# Patient Record
Sex: FEMALE | Race: WHITE | NOT HISPANIC OR LATINO
[De-identification: names, ages, dates, MRNs, and addresses within clinical notes are randomized per-mention and may not be internally consistent; named-entity substitution may affect disease eponyms.]

---

## 2017-02-01 ENCOUNTER — APPOINTMENT (OUTPATIENT)
Dept: UROGYNECOLOGY | Facility: CLINIC | Age: 82
End: 2017-02-01

## 2017-02-15 ENCOUNTER — APPOINTMENT (OUTPATIENT)
Dept: UROGYNECOLOGY | Facility: CLINIC | Age: 82
End: 2017-02-15

## 2017-05-01 ENCOUNTER — OTHER (OUTPATIENT)
Age: 82
End: 2017-05-01

## 2017-05-02 ENCOUNTER — APPOINTMENT (OUTPATIENT)
Dept: ENDOCRINOLOGY | Facility: CLINIC | Age: 82
End: 2017-05-02

## 2017-05-02 VITALS — HEIGHT: 58.3 IN | BODY MASS INDEX: 24.64 KG/M2 | WEIGHT: 119 LBS

## 2017-05-02 VITALS — HEART RATE: 65 BPM | OXYGEN SATURATION: 98 % | SYSTOLIC BLOOD PRESSURE: 130 MMHG | DIASTOLIC BLOOD PRESSURE: 62 MMHG

## 2017-05-02 LAB
ALBUMIN SERPL ELPH-MCNC: 3.9 G/DL
ALP BLD-CCNC: 35 U/L
ALT SERPL-CCNC: 23 U/L
ANION GAP SERPL CALC-SCNC: 14 MMOL/L
AST SERPL-CCNC: 30 U/L
BILIRUB SERPL-MCNC: 0.2 MG/DL
BUN SERPL-MCNC: 12 MG/DL
CALCIUM SERPL-MCNC: 9.8 MG/DL
CHLORIDE SERPL-SCNC: 102 MMOL/L
CO2 SERPL-SCNC: 28 MMOL/L
CREAT SERPL-MCNC: 0.71 MG/DL
GLUCOSE SERPL-MCNC: 99 MG/DL
POTASSIUM SERPL-SCNC: 4.3 MMOL/L
PROT SERPL-MCNC: 6.4 G/DL
SODIUM SERPL-SCNC: 144 MMOL/L

## 2017-05-02 RX ORDER — DENOSUMAB 60 MG/ML
60 INJECTION SUBCUTANEOUS
Qty: 1 | Refills: 0 | Status: COMPLETED | OUTPATIENT
Start: 2017-05-02

## 2017-05-02 RX ADMIN — DENOSUMAB 0 MG/ML: 60 INJECTION SUBCUTANEOUS at 00:00

## 2017-05-17 ENCOUNTER — APPOINTMENT (OUTPATIENT)
Dept: UROGYNECOLOGY | Facility: CLINIC | Age: 82
End: 2017-05-17

## 2017-07-25 ENCOUNTER — APPOINTMENT (OUTPATIENT)
Dept: OBGYN | Facility: CLINIC | Age: 82
End: 2017-07-25

## 2017-07-25 VITALS
WEIGHT: 122 LBS | HEIGHT: 58 IN | DIASTOLIC BLOOD PRESSURE: 80 MMHG | BODY MASS INDEX: 25.61 KG/M2 | SYSTOLIC BLOOD PRESSURE: 122 MMHG

## 2017-07-25 DIAGNOSIS — Z12.12 ENCOUNTER FOR SCREENING FOR MALIGNANT NEOPLASM OF COLON: ICD-10-CM

## 2017-07-25 DIAGNOSIS — Z12.11 ENCOUNTER FOR SCREENING FOR MALIGNANT NEOPLASM OF COLON: ICD-10-CM

## 2017-07-26 ENCOUNTER — MESSAGE (OUTPATIENT)
Age: 82
End: 2017-07-26

## 2017-07-26 LAB — ESTRADIOL SERPL-MCNC: <5 PG/ML

## 2017-08-16 ENCOUNTER — APPOINTMENT (OUTPATIENT)
Dept: UROGYNECOLOGY | Facility: CLINIC | Age: 82
End: 2017-08-16
Payer: MEDICARE

## 2017-08-16 PROCEDURE — 99214 OFFICE O/P EST MOD 30 MIN: CPT

## 2017-08-30 ENCOUNTER — APPOINTMENT (OUTPATIENT)
Dept: UROGYNECOLOGY | Facility: CLINIC | Age: 82
End: 2017-08-30
Payer: MEDICARE

## 2017-08-30 PROCEDURE — 99214 OFFICE O/P EST MOD 30 MIN: CPT

## 2017-08-30 PROCEDURE — A4562: CPT

## 2017-09-06 ENCOUNTER — APPOINTMENT (OUTPATIENT)
Dept: UROGYNECOLOGY | Facility: CLINIC | Age: 82
End: 2017-09-06
Payer: MEDICARE

## 2017-09-06 DIAGNOSIS — N89.8 OTHER SPECIFIED NONINFLAMMATORY DISORDERS OF VAGINA: ICD-10-CM

## 2017-09-06 PROCEDURE — 99214 OFFICE O/P EST MOD 30 MIN: CPT

## 2017-09-19 ENCOUNTER — MESSAGE (OUTPATIENT)
Age: 82
End: 2017-09-19

## 2017-09-20 ENCOUNTER — APPOINTMENT (OUTPATIENT)
Dept: UROGYNECOLOGY | Facility: CLINIC | Age: 82
End: 2017-09-20
Payer: MEDICARE

## 2017-09-20 DIAGNOSIS — R10.2 PELVIC AND PERINEAL PAIN: ICD-10-CM

## 2017-09-20 PROCEDURE — 99214 OFFICE O/P EST MOD 30 MIN: CPT

## 2017-09-27 ENCOUNTER — APPOINTMENT (OUTPATIENT)
Dept: UROGYNECOLOGY | Facility: CLINIC | Age: 82
End: 2017-09-27

## 2017-11-08 ENCOUNTER — APPOINTMENT (OUTPATIENT)
Dept: UROGYNECOLOGY | Facility: CLINIC | Age: 82
End: 2017-11-08
Payer: MEDICARE

## 2017-11-08 PROCEDURE — 99214 OFFICE O/P EST MOD 30 MIN: CPT

## 2017-11-21 ENCOUNTER — APPOINTMENT (OUTPATIENT)
Dept: ENDOCRINOLOGY | Facility: CLINIC | Age: 82
End: 2017-11-21
Payer: MEDICARE

## 2017-11-21 VITALS
HEIGHT: 58 IN | DIASTOLIC BLOOD PRESSURE: 80 MMHG | WEIGHT: 124 LBS | HEART RATE: 65 BPM | OXYGEN SATURATION: 98 % | BODY MASS INDEX: 26.03 KG/M2 | SYSTOLIC BLOOD PRESSURE: 126 MMHG

## 2017-11-21 PROCEDURE — 96372 THER/PROPH/DIAG INJ SC/IM: CPT

## 2017-11-21 PROCEDURE — 99214 OFFICE O/P EST MOD 30 MIN: CPT | Mod: 25

## 2017-11-21 RX ORDER — DENOSUMAB 60 MG/ML
60 INJECTION SUBCUTANEOUS
Qty: 0 | Refills: 0 | Status: COMPLETED | OUTPATIENT
Start: 2017-11-21

## 2017-11-21 RX ADMIN — DENOSUMAB 0 MG/ML: 60 INJECTION SUBCUTANEOUS at 00:00

## 2017-12-26 ENCOUNTER — MEDICATION RENEWAL (OUTPATIENT)
Age: 82
End: 2017-12-26

## 2018-02-07 ENCOUNTER — APPOINTMENT (OUTPATIENT)
Dept: UROGYNECOLOGY | Facility: CLINIC | Age: 83
End: 2018-02-07

## 2018-02-09 ENCOUNTER — APPOINTMENT (OUTPATIENT)
Dept: UROGYNECOLOGY | Facility: CLINIC | Age: 83
End: 2018-02-09
Payer: MEDICARE

## 2018-02-09 PROCEDURE — 99213 OFFICE O/P EST LOW 20 MIN: CPT

## 2018-04-11 ENCOUNTER — APPOINTMENT (OUTPATIENT)
Dept: UROGYNECOLOGY | Facility: CLINIC | Age: 83
End: 2018-04-11
Payer: MEDICARE

## 2018-04-11 PROCEDURE — 99214 OFFICE O/P EST MOD 30 MIN: CPT

## 2018-04-11 PROCEDURE — A4562: CPT

## 2018-05-18 PROBLEM — Z92.89 VAGINAL PESSARY PRESENT: Status: ACTIVE | Noted: 2018-05-18

## 2018-05-18 PROBLEM — M79.606 LEG PAIN: Status: ACTIVE | Noted: 2018-05-18

## 2018-05-18 PROBLEM — Z82.49 FAMILY HISTORY OF CARDIAC DISORDER: Status: ACTIVE | Noted: 2018-05-18

## 2018-05-18 PROBLEM — M77.9 TENDINITIS: Status: ACTIVE | Noted: 2018-05-18

## 2018-05-18 PROBLEM — N84.0 UTERINE POLYP: Status: ACTIVE | Noted: 2018-05-18

## 2018-05-18 RX ORDER — ASCORBIC ACID 500 MG
500 TABLET ORAL
Refills: 0 | Status: ACTIVE | COMMUNITY

## 2018-05-24 ENCOUNTER — APPOINTMENT (OUTPATIENT)
Dept: ENDOCRINOLOGY | Facility: CLINIC | Age: 83
End: 2018-05-24
Payer: MEDICARE

## 2018-05-24 VITALS
HEART RATE: 77 BPM | SYSTOLIC BLOOD PRESSURE: 130 MMHG | HEIGHT: 58 IN | DIASTOLIC BLOOD PRESSURE: 64 MMHG | BODY MASS INDEX: 25.19 KG/M2 | OXYGEN SATURATION: 98 % | WEIGHT: 120 LBS

## 2018-05-24 DIAGNOSIS — N84.0 POLYP OF CORPUS UTERI: ICD-10-CM

## 2018-05-24 DIAGNOSIS — Z83.3 FAMILY HISTORY OF DIABETES MELLITUS: ICD-10-CM

## 2018-05-24 DIAGNOSIS — M77.9 ENTHESOPATHY, UNSPECIFIED: ICD-10-CM

## 2018-05-24 DIAGNOSIS — Z82.49 FAMILY HISTORY OF ISCHEMIC HEART DISEASE AND OTHER DISEASES OF THE CIRCULATORY SYSTEM: ICD-10-CM

## 2018-05-24 DIAGNOSIS — M79.606 PAIN IN LEG, UNSPECIFIED: ICD-10-CM

## 2018-05-24 DIAGNOSIS — Z92.89 PERSONAL HISTORY OF OTHER MEDICAL TREATMENT: ICD-10-CM

## 2018-05-24 PROCEDURE — 96372 THER/PROPH/DIAG INJ SC/IM: CPT

## 2018-05-24 PROCEDURE — ZZZZZ: CPT

## 2018-05-24 PROCEDURE — 77080 DXA BONE DENSITY AXIAL: CPT | Mod: GA

## 2018-05-24 PROCEDURE — 99214 OFFICE O/P EST MOD 30 MIN: CPT | Mod: 25

## 2018-05-24 RX ORDER — ESTRADIOL 10 UG/1
10 INSERT VAGINAL
Refills: 0 | Status: DISCONTINUED | COMMUNITY
End: 2018-05-24

## 2018-05-24 RX ORDER — DENOSUMAB 60 MG/ML
60 INJECTION SUBCUTANEOUS
Qty: 0 | Refills: 0 | Status: COMPLETED | OUTPATIENT
Start: 2018-05-24

## 2018-05-24 RX ADMIN — DENOSUMAB 0 MG/ML: 60 INJECTION SUBCUTANEOUS at 00:00

## 2018-05-25 ENCOUNTER — APPOINTMENT (OUTPATIENT)
Dept: ENDOCRINOLOGY | Facility: CLINIC | Age: 83
End: 2018-05-25

## 2018-06-07 ENCOUNTER — NON-APPOINTMENT (OUTPATIENT)
Age: 83
End: 2018-06-07

## 2018-06-07 ENCOUNTER — APPOINTMENT (OUTPATIENT)
Age: 83
End: 2018-06-07
Payer: MEDICARE

## 2018-06-07 VITALS
HEIGHT: 58 IN | TEMPERATURE: 97.7 F | DIASTOLIC BLOOD PRESSURE: 74 MMHG | BODY MASS INDEX: 25.61 KG/M2 | WEIGHT: 122 LBS | SYSTOLIC BLOOD PRESSURE: 126 MMHG

## 2018-06-07 DIAGNOSIS — M25.552 PAIN IN LEFT HIP: ICD-10-CM

## 2018-06-07 DIAGNOSIS — Z13.89 ENCOUNTER FOR SCREENING FOR OTHER DISORDER: ICD-10-CM

## 2018-06-07 DIAGNOSIS — Z23 ENCOUNTER FOR IMMUNIZATION: ICD-10-CM

## 2018-06-07 PROCEDURE — 90670 PCV13 VACCINE IM: CPT

## 2018-06-07 PROCEDURE — G0009: CPT

## 2018-06-07 PROCEDURE — 93000 ELECTROCARDIOGRAM COMPLETE: CPT

## 2018-06-07 PROCEDURE — G0439: CPT

## 2018-06-07 PROCEDURE — 36415 COLL VENOUS BLD VENIPUNCTURE: CPT

## 2018-06-07 NOTE — PLAN
[FreeTextEntry1] : Check labs today. She has declined HIV testing in the past\par Will refer to orthopedics today\par Given a prescription for mammogram today\par Followup with endocrinology\par Followup with GYN

## 2018-06-07 NOTE — HISTORY OF PRESENT ILLNESS
[FreeTextEntry1] : annual physical [de-identified] : She still  has left hip  pain. she has done 3 sessions of PT. The pain is just slightly better. She has been taking meloxicam.\par She has seen her endocrinologist and was given prolia.\par She has see her UroGYN re: her pessary. sill using vagifem\par has had a skin exam

## 2018-06-07 NOTE — HEALTH RISK ASSESSMENT
[Good] : ~his/her~  mood as  good [] : No [No falls in past year] : Patient reported no falls in the past year [0] : 2) Feeling down, depressed, or hopeless: Not at all (0) [de-identified] : social alcohol [[id number]] : 0 [Patient reported mammogram was normal] : Patient reported mammogram was normal [Patient reported bone density results were abnormal] : Patient reported bone density results were abnormal [HIV test declined] : HIV test declined [Hepatitis C test declined] : Hepatitis C test declined [Change in mental status noted] : No change in mental status noted [None] : None [Fully functional (bathing, dressing, toileting, transferring, walking, feeding)] : Fully functional (bathing, dressing, toileting, transferring, walking, feeding) [Fully functional (using the telephone, shopping, preparing meals, housekeeping, doing laundry, using] : Fully functional and needs no help or supervision to perform IADLs (using the telephone, shopping, preparing meals, housekeeping, doing laundry, using transportation, managing medications and managing finances) [Reports changes in hearing] : Reports no changes in hearing [Reports changes in vision] : Reports no changes in vision [Reports changes in dental health] : Reports no changes in dental health [MammogramDate] : 08/17 [BoneDensityDate] : 05/18

## 2018-06-08 ENCOUNTER — MESSAGE (OUTPATIENT)
Age: 83
End: 2018-06-08

## 2018-06-11 LAB
25(OH)D3 SERPL-MCNC: 40 NG/ML
ALBUMIN SERPL ELPH-MCNC: 4.4 G/DL
ALP BLD-CCNC: 40 U/L
ALT SERPL-CCNC: 21 U/L
ANION GAP SERPL CALC-SCNC: 14 MMOL/L
APPEARANCE: CLEAR
AST SERPL-CCNC: 26 U/L
BACTERIA: NEGATIVE
BASOPHILS # BLD AUTO: 0.07 K/UL
BASOPHILS NFR BLD AUTO: 1.1 %
BILIRUB SERPL-MCNC: 0.4 MG/DL
BILIRUBIN URINE: NEGATIVE
BLOOD URINE: NEGATIVE
BUN SERPL-MCNC: 11 MG/DL
CALCIUM SERPL-MCNC: 10 MG/DL
CHLORIDE SERPL-SCNC: 103 MMOL/L
CHOLEST SERPL-MCNC: 244 MG/DL
CHOLEST/HDLC SERPL: 3.3 RATIO
CO2 SERPL-SCNC: 25 MMOL/L
COLOR: YELLOW
CREAT SERPL-MCNC: 0.75 MG/DL
EOSINOPHIL # BLD AUTO: 0.39 K/UL
EOSINOPHIL NFR BLD AUTO: 6.3 %
GLUCOSE QUALITATIVE U: NEGATIVE MG/DL
GLUCOSE SERPL-MCNC: 90 MG/DL
HBA1C MFR BLD HPLC: 5.4 %
HCT VFR BLD CALC: 44.7 %
HDLC SERPL-MCNC: 73 MG/DL
HGB BLD-MCNC: 14.5 G/DL
HYALINE CASTS: 1 /LPF
IMM GRANULOCYTES NFR BLD AUTO: 0.2 %
KETONES URINE: NEGATIVE
LDLC SERPL CALC-MCNC: 157 MG/DL
LEUKOCYTE ESTERASE URINE: ABNORMAL
LYMPHOCYTES # BLD AUTO: 1.71 K/UL
LYMPHOCYTES NFR BLD AUTO: 27.8 %
MAN DIFF?: NORMAL
MCHC RBC-ENTMCNC: 31.3 PG
MCHC RBC-ENTMCNC: 32.4 GM/DL
MCV RBC AUTO: 96.5 FL
MICROSCOPIC-UA: NORMAL
MONOCYTES # BLD AUTO: 0.59 K/UL
MONOCYTES NFR BLD AUTO: 9.6 %
NEUTROPHILS # BLD AUTO: 3.39 K/UL
NEUTROPHILS NFR BLD AUTO: 55 %
NITRITE URINE: NEGATIVE
PH URINE: 7
PLATELET # BLD AUTO: 416 K/UL
POTASSIUM SERPL-SCNC: 4.6 MMOL/L
PROT SERPL-MCNC: 6.9 G/DL
PROTEIN URINE: NEGATIVE MG/DL
RBC # BLD: 4.63 M/UL
RBC # FLD: 13.9 %
RED BLOOD CELLS URINE: 1 /HPF
SODIUM SERPL-SCNC: 142 MMOL/L
SPECIFIC GRAVITY URINE: 1.01
SQUAMOUS EPITHELIAL CELLS: 2 /HPF
TRIGL SERPL-MCNC: 71 MG/DL
TSH SERPL-ACNC: 3.23 UIU/ML
UROBILINOGEN URINE: NEGATIVE MG/DL
WBC # FLD AUTO: 6.16 K/UL
WHITE BLOOD CELLS URINE: 26 /HPF

## 2018-07-05 ENCOUNTER — TRANSCRIPTION ENCOUNTER (OUTPATIENT)
Age: 83
End: 2018-07-05

## 2018-07-10 ENCOUNTER — OUTPATIENT (OUTPATIENT)
Dept: OUTPATIENT SERVICES | Facility: HOSPITAL | Age: 83
LOS: 1 days | End: 2018-07-10
Payer: MEDICARE

## 2018-07-10 VITALS
RESPIRATION RATE: 15 BRPM | HEART RATE: 71 BPM | HEIGHT: 59 IN | DIASTOLIC BLOOD PRESSURE: 67 MMHG | TEMPERATURE: 98 F | SYSTOLIC BLOOD PRESSURE: 140 MMHG | WEIGHT: 119.93 LBS

## 2018-07-10 DIAGNOSIS — Z01.818 ENCOUNTER FOR OTHER PREPROCEDURAL EXAMINATION: ICD-10-CM

## 2018-07-10 DIAGNOSIS — Z41.9 ENCOUNTER FOR PROCEDURE FOR PURPOSES OTHER THAN REMEDYING HEALTH STATE, UNSPECIFIED: Chronic | ICD-10-CM

## 2018-07-10 DIAGNOSIS — M84.352A STRESS FRACTURE, LEFT FEMUR, INITIAL ENCOUNTER FOR FRACTURE: ICD-10-CM

## 2018-07-10 LAB
ALBUMIN SERPL ELPH-MCNC: 3.7 G/DL — SIGNIFICANT CHANGE UP (ref 3.3–5)
ALP SERPL-CCNC: 42 U/L — SIGNIFICANT CHANGE UP (ref 40–120)
ALT FLD-CCNC: 26 U/L — SIGNIFICANT CHANGE UP (ref 12–78)
ANION GAP SERPL CALC-SCNC: 3 MMOL/L — LOW (ref 5–17)
AST SERPL-CCNC: 26 U/L — SIGNIFICANT CHANGE UP (ref 15–37)
BILIRUB SERPL-MCNC: 0.4 MG/DL — SIGNIFICANT CHANGE UP (ref 0.2–1.2)
BUN SERPL-MCNC: 11 MG/DL — SIGNIFICANT CHANGE UP (ref 7–23)
CALCIUM SERPL-MCNC: 8.9 MG/DL — SIGNIFICANT CHANGE UP (ref 8.5–10.1)
CHLORIDE SERPL-SCNC: 107 MMOL/L — SIGNIFICANT CHANGE UP (ref 96–108)
CO2 SERPL-SCNC: 31 MMOL/L — SIGNIFICANT CHANGE UP (ref 22–31)
CREAT SERPL-MCNC: 0.81 MG/DL — SIGNIFICANT CHANGE UP (ref 0.5–1.3)
GLUCOSE SERPL-MCNC: 84 MG/DL — SIGNIFICANT CHANGE UP (ref 70–99)
HCT VFR BLD CALC: 41.8 % — SIGNIFICANT CHANGE UP (ref 34.5–45)
HGB BLD-MCNC: 14.6 G/DL — SIGNIFICANT CHANGE UP (ref 11.5–15.5)
MCHC RBC-ENTMCNC: 32.5 PG — SIGNIFICANT CHANGE UP (ref 27–34)
MCHC RBC-ENTMCNC: 34.9 GM/DL — SIGNIFICANT CHANGE UP (ref 32–36)
MCV RBC AUTO: 93.1 FL — SIGNIFICANT CHANGE UP (ref 80–100)
NRBC # BLD: 0 /100 WBCS — SIGNIFICANT CHANGE UP (ref 0–0)
PLATELET # BLD AUTO: 404 K/UL — HIGH (ref 150–400)
POTASSIUM SERPL-MCNC: 4.3 MMOL/L — SIGNIFICANT CHANGE UP (ref 3.5–5.3)
POTASSIUM SERPL-SCNC: 4.3 MMOL/L — SIGNIFICANT CHANGE UP (ref 3.5–5.3)
PROT SERPL-MCNC: 7.4 G/DL — SIGNIFICANT CHANGE UP (ref 6–8.3)
RBC # BLD: 4.49 M/UL — SIGNIFICANT CHANGE UP (ref 3.8–5.2)
RBC # FLD: 13.2 % — SIGNIFICANT CHANGE UP (ref 10.3–14.5)
SODIUM SERPL-SCNC: 141 MMOL/L — SIGNIFICANT CHANGE UP (ref 135–145)
WBC # BLD: 6.35 K/UL — SIGNIFICANT CHANGE UP (ref 3.8–10.5)
WBC # FLD AUTO: 6.35 K/UL — SIGNIFICANT CHANGE UP (ref 3.8–10.5)

## 2018-07-10 PROCEDURE — 85027 COMPLETE CBC AUTOMATED: CPT

## 2018-07-10 PROCEDURE — 93010 ELECTROCARDIOGRAM REPORT: CPT | Mod: NC

## 2018-07-10 PROCEDURE — 80053 COMPREHEN METABOLIC PANEL: CPT

## 2018-07-10 PROCEDURE — G0463: CPT

## 2018-07-10 PROCEDURE — 93005 ELECTROCARDIOGRAM TRACING: CPT

## 2018-07-10 NOTE — H&P PST ADULT - NSANTHOSAYNRD_GEN_A_CORE
No. BARBI screening performed.  STOP BANG Legend: 0-2 = LOW Risk; 3-4 = INTERMEDIATE Risk; 5-8 = HIGH Risk

## 2018-07-11 ENCOUNTER — APPOINTMENT (OUTPATIENT)
Dept: UROGYNECOLOGY | Facility: CLINIC | Age: 83
End: 2018-07-11
Payer: MEDICARE

## 2018-07-11 PROCEDURE — 99214 OFFICE O/P EST MOD 30 MIN: CPT

## 2018-07-13 ENCOUNTER — APPOINTMENT (OUTPATIENT)
Dept: INTERNAL MEDICINE | Facility: CLINIC | Age: 83
End: 2018-07-13
Payer: MEDICARE

## 2018-07-13 VITALS
TEMPERATURE: 97.4 F | SYSTOLIC BLOOD PRESSURE: 110 MMHG | HEIGHT: 58 IN | WEIGHT: 121 LBS | DIASTOLIC BLOOD PRESSURE: 69 MMHG | BODY MASS INDEX: 25.4 KG/M2

## 2018-07-13 DIAGNOSIS — S72.92XA UNSPECIFIED FRACTURE OF LEFT FEMUR, INITIAL ENCOUNTER FOR CLOSED FRACTURE: ICD-10-CM

## 2018-07-13 DIAGNOSIS — Z01.818 ENCOUNTER FOR OTHER PREPROCEDURAL EXAMINATION: ICD-10-CM

## 2018-07-13 PROCEDURE — 99214 OFFICE O/P EST MOD 30 MIN: CPT

## 2018-07-13 RX ORDER — MELOXICAM 7.5 MG/1
7.5 TABLET ORAL DAILY
Refills: 0 | Status: DISCONTINUED | COMMUNITY
End: 2018-07-13

## 2018-07-13 NOTE — HISTORY OF PRESENT ILLNESS
[Coronary Artery Disease] : no coronary artery disease [Diabetes] : no diabetes [Sleep Apnea] : no sleep apnea [COPD] : no COPD [Previous Adverse Anesthesia Reaction] : no previous adverse anesthesia reaction [FreeTextEntry1] : left femur fracture kell placement  [FreeTextEntry2] : 07/18/2018 [FreeTextEntry3] : Dr. Giraldo [FreeTextEntry4] : 82-year-old female who has been complaining of left leg pain since May. She had an x-ray done that was negative. She started an anti-inflammatory without any relief. She was referred to orthopedics at her last visit and was found to have a left hairline left femur fracture. She is planned for surgical repair on July 18, 2018 with Dr Giraldo

## 2018-07-13 NOTE — ASSESSMENT
[High Risk Surgery - Intraperitoneal, Intrathoracic or Supringuinal Vascular Procedures] : High Risk Surgery - Intraperitoneal, Intrathoracic or Supringuinal Vascular Procedures - No (0) [Ischemic Heart Disease] : Ischemic Heart Disease - No (0) [Congestive Heart Failure] : Congestive Heart Failure - No (0) [Prior Cerebrovascular Accident or TIA] : Prior Cerebrovascular Accident or TIA - No (0) [Creatinine >= 2mg/dL (1 Point)] : Creatinine >= 2mg/dL - No (0) [Insulin-dependent Diabetic (1 Point)] : Insulin-dependent Diabetic - No (0) [Patient Optimized for Surgery] : Patient optimized for surgery [No Further Testing Recommended] : no further testing recommended [As per surgery] : as per surgery

## 2018-07-17 ENCOUNTER — TRANSCRIPTION ENCOUNTER (OUTPATIENT)
Age: 83
End: 2018-07-17

## 2018-07-17 ENCOUNTER — RX RENEWAL (OUTPATIENT)
Age: 83
End: 2018-07-17

## 2018-07-18 ENCOUNTER — TRANSCRIPTION ENCOUNTER (OUTPATIENT)
Age: 83
End: 2018-07-18

## 2018-07-18 ENCOUNTER — INPATIENT (INPATIENT)
Facility: HOSPITAL | Age: 83
LOS: 1 days | Discharge: HOME | DRG: 482 | End: 2018-07-20
Attending: ORTHOPAEDIC SURGERY | Admitting: ORTHOPAEDIC SURGERY
Payer: MEDICARE

## 2018-07-18 VITALS
OXYGEN SATURATION: 100 % | RESPIRATION RATE: 22 BRPM | WEIGHT: 119.93 LBS | DIASTOLIC BLOOD PRESSURE: 69 MMHG | HEIGHT: 59 IN | SYSTOLIC BLOOD PRESSURE: 147 MMHG | TEMPERATURE: 98 F | HEART RATE: 75 BPM

## 2018-07-18 DIAGNOSIS — Z41.9 ENCOUNTER FOR PROCEDURE FOR PURPOSES OTHER THAN REMEDYING HEALTH STATE, UNSPECIFIED: Chronic | ICD-10-CM

## 2018-07-18 DIAGNOSIS — M84.352A STRESS FRACTURE, LEFT FEMUR, INITIAL ENCOUNTER FOR FRACTURE: ICD-10-CM

## 2018-07-18 LAB
ANION GAP SERPL CALC-SCNC: 6 MMOL/L — SIGNIFICANT CHANGE UP (ref 5–17)
BUN SERPL-MCNC: 10 MG/DL — SIGNIFICANT CHANGE UP (ref 7–23)
CALCIUM SERPL-MCNC: 8.5 MG/DL — SIGNIFICANT CHANGE UP (ref 8.5–10.1)
CHLORIDE SERPL-SCNC: 109 MMOL/L — HIGH (ref 96–108)
CO2 SERPL-SCNC: 28 MMOL/L — SIGNIFICANT CHANGE UP (ref 22–31)
CREAT SERPL-MCNC: 0.77 MG/DL — SIGNIFICANT CHANGE UP (ref 0.5–1.3)
GLUCOSE SERPL-MCNC: 99 MG/DL — SIGNIFICANT CHANGE UP (ref 70–99)
HCT VFR BLD CALC: 38.5 % — SIGNIFICANT CHANGE UP (ref 34.5–45)
HGB BLD-MCNC: 13.3 G/DL — SIGNIFICANT CHANGE UP (ref 11.5–15.5)
MCHC RBC-ENTMCNC: 32 PG — SIGNIFICANT CHANGE UP (ref 27–34)
MCHC RBC-ENTMCNC: 34.5 GM/DL — SIGNIFICANT CHANGE UP (ref 32–36)
MCV RBC AUTO: 92.8 FL — SIGNIFICANT CHANGE UP (ref 80–100)
NRBC # BLD: 0 /100 WBCS — SIGNIFICANT CHANGE UP (ref 0–0)
PLATELET # BLD AUTO: 342 K/UL — SIGNIFICANT CHANGE UP (ref 150–400)
POTASSIUM SERPL-MCNC: 4.4 MMOL/L — SIGNIFICANT CHANGE UP (ref 3.5–5.3)
POTASSIUM SERPL-SCNC: 4.4 MMOL/L — SIGNIFICANT CHANGE UP (ref 3.5–5.3)
RBC # BLD: 4.15 M/UL — SIGNIFICANT CHANGE UP (ref 3.8–5.2)
RBC # FLD: 13.2 % — SIGNIFICANT CHANGE UP (ref 10.3–14.5)
SODIUM SERPL-SCNC: 143 MMOL/L — SIGNIFICANT CHANGE UP (ref 135–145)
WBC # BLD: 12.44 K/UL — HIGH (ref 3.8–10.5)
WBC # FLD AUTO: 12.44 K/UL — HIGH (ref 3.8–10.5)

## 2018-07-18 RX ORDER — ONDANSETRON 8 MG/1
4 TABLET, FILM COATED ORAL ONCE
Qty: 0 | Refills: 0 | Status: DISCONTINUED | OUTPATIENT
Start: 2018-07-18 | End: 2018-07-18

## 2018-07-18 RX ORDER — OXYCODONE HYDROCHLORIDE 5 MG/1
5 TABLET ORAL EVERY 4 HOURS
Qty: 0 | Refills: 0 | Status: DISCONTINUED | OUTPATIENT
Start: 2018-07-18 | End: 2018-07-20

## 2018-07-18 RX ORDER — HYDROMORPHONE HYDROCHLORIDE 2 MG/ML
0.5 INJECTION INTRAMUSCULAR; INTRAVENOUS; SUBCUTANEOUS
Qty: 0 | Refills: 0 | Status: DISCONTINUED | OUTPATIENT
Start: 2018-07-18 | End: 2018-07-18

## 2018-07-18 RX ORDER — CEFAZOLIN SODIUM 1 G
2000 VIAL (EA) INJECTION EVERY 8 HOURS
Qty: 0 | Refills: 0 | Status: DISCONTINUED | OUTPATIENT
Start: 2018-07-18 | End: 2018-07-18

## 2018-07-18 RX ORDER — ASPIRIN/CALCIUM CARB/MAGNESIUM 324 MG
325 TABLET ORAL
Qty: 0 | Refills: 0 | Status: DISCONTINUED | OUTPATIENT
Start: 2018-07-18 | End: 2018-07-20

## 2018-07-18 RX ORDER — PANTOPRAZOLE SODIUM 20 MG/1
40 TABLET, DELAYED RELEASE ORAL
Qty: 0 | Refills: 0 | Status: DISCONTINUED | OUTPATIENT
Start: 2018-07-18 | End: 2018-07-20

## 2018-07-18 RX ORDER — ACETAMINOPHEN 500 MG
650 TABLET ORAL EVERY 6 HOURS
Qty: 0 | Refills: 0 | Status: DISCONTINUED | OUTPATIENT
Start: 2018-07-18 | End: 2018-07-20

## 2018-07-18 RX ORDER — OXYCODONE HYDROCHLORIDE 5 MG/1
10 TABLET ORAL EVERY 4 HOURS
Qty: 0 | Refills: 0 | Status: DISCONTINUED | OUTPATIENT
Start: 2018-07-18 | End: 2018-07-20

## 2018-07-18 RX ORDER — OXYCODONE HYDROCHLORIDE 5 MG/1
5 TABLET ORAL EVERY 4 HOURS
Qty: 0 | Refills: 0 | Status: DISCONTINUED | OUTPATIENT
Start: 2018-07-18 | End: 2018-07-18

## 2018-07-18 RX ORDER — SODIUM CHLORIDE 9 MG/ML
1000 INJECTION, SOLUTION INTRAVENOUS
Qty: 0 | Refills: 0 | Status: DISCONTINUED | OUTPATIENT
Start: 2018-07-18 | End: 2018-07-20

## 2018-07-18 RX ORDER — SODIUM CHLORIDE 9 MG/ML
1000 INJECTION, SOLUTION INTRAVENOUS
Qty: 0 | Refills: 0 | Status: DISCONTINUED | OUTPATIENT
Start: 2018-07-18 | End: 2018-07-18

## 2018-07-18 RX ORDER — HYDROMORPHONE HYDROCHLORIDE 2 MG/ML
0.5 INJECTION INTRAMUSCULAR; INTRAVENOUS; SUBCUTANEOUS
Qty: 0 | Refills: 0 | Status: DISCONTINUED | OUTPATIENT
Start: 2018-07-18 | End: 2018-07-20

## 2018-07-18 RX ORDER — DOCUSATE SODIUM 100 MG
100 CAPSULE ORAL THREE TIMES A DAY
Qty: 0 | Refills: 0 | Status: DISCONTINUED | OUTPATIENT
Start: 2018-07-18 | End: 2018-07-20

## 2018-07-18 RX ORDER — CEFAZOLIN SODIUM 1 G
2000 VIAL (EA) INJECTION EVERY 8 HOURS
Qty: 0 | Refills: 0 | Status: COMPLETED | OUTPATIENT
Start: 2018-07-18 | End: 2018-07-19

## 2018-07-18 RX ADMIN — Medication 100 MILLIGRAM(S): at 18:20

## 2018-07-18 RX ADMIN — HYDROMORPHONE HYDROCHLORIDE 0.5 MILLIGRAM(S): 2 INJECTION INTRAMUSCULAR; INTRAVENOUS; SUBCUTANEOUS at 12:51

## 2018-07-18 RX ADMIN — OXYCODONE HYDROCHLORIDE 10 MILLIGRAM(S): 5 TABLET ORAL at 20:45

## 2018-07-18 RX ADMIN — HYDROMORPHONE HYDROCHLORIDE 0.5 MILLIGRAM(S): 2 INJECTION INTRAMUSCULAR; INTRAVENOUS; SUBCUTANEOUS at 12:32

## 2018-07-18 RX ADMIN — OXYCODONE HYDROCHLORIDE 10 MILLIGRAM(S): 5 TABLET ORAL at 16:36

## 2018-07-18 RX ADMIN — OXYCODONE HYDROCHLORIDE 10 MILLIGRAM(S): 5 TABLET ORAL at 16:47

## 2018-07-18 RX ADMIN — Medication 325 MILLIGRAM(S): at 21:24

## 2018-07-18 RX ADMIN — SODIUM CHLORIDE 100 MILLILITER(S): 9 INJECTION, SOLUTION INTRAVENOUS at 13:02

## 2018-07-18 RX ADMIN — OXYCODONE HYDROCHLORIDE 10 MILLIGRAM(S): 5 TABLET ORAL at 21:30

## 2018-07-18 RX ADMIN — Medication 100 MILLIGRAM(S): at 21:24

## 2018-07-18 NOTE — PROGRESS NOTE ADULT - SUBJECTIVE AND OBJECTIVE BOX
POST OP CHECK:    Subjective & Objective:  84 yo F s/p ORIF of L Femur stress fx POD #0. Patient is doing well, pain is well controlled. VSS. Eating dinner.    Vital Signs Last 24 Hrs  T(C): 36.5 (18 Jul 2018 15:48), Max: 36.7 (18 Jul 2018 13:24)  T(F): 97.7 (18 Jul 2018 15:48), Max: 98.1 (18 Jul 2018 13:24)  HR: 75 (18 Jul 2018 15:48) (66 - 78)  BP: 103/65 (18 Jul 2018 15:48) (103/65 - 147/69)  BP(mean): --  RR: 18 (18 Jul 2018 15:48) (12 - 22)  SpO2: 100% (18 Jul 2018 15:48) (97% - 100%)    PE:  General: NAD, eating dinner with  in room.   LLE:  Dressing CDI  No erythema  Compartment soft and compressible  SILT L2-S1  EHL/FHL/Gastroc/AT intact  DP 2+

## 2018-07-18 NOTE — DISCHARGE NOTE ADULT - PLAN OF CARE
return to ADLs 1.	Pain Control  2.	Walking with full weight bearing as tolerated, with assistive devices (walker/Cane as Needed)  3.	DVT Prophylaxis for 30 days, Ecotrin 325mg PO BID  4.	PT as needed  5.	Follow up with Dr. Giraldo as Outpatient in 10-14 Days after Discharge from the Hospital or Rehab. Call Office For Appointment.  6.	Remove Dressing Post-Op Day 10, with Daily Dressing Changes as Need.  7.	Ice affected area as Needed  8.	Keep Dressing  Clean and dry.

## 2018-07-18 NOTE — DISCHARGE NOTE ADULT - CARE PLAN
Principal Discharge DX:	Femoral neck fracture  Goal:	return to ADLs  Assessment and plan of treatment:	1.	Pain Control  2.	Walking with full weight bearing as tolerated, with assistive devices (walker/Cane as Needed)  3.	DVT Prophylaxis for 30 days, Ecotrin 325mg PO BID  4.	PT as needed  5.	Follow up with Dr. Giraldo as Outpatient in 10-14 Days after Discharge from the Hospital or Rehab. Call Office For Appointment.  6.	Remove Dressing Post-Op Day 10, with Daily Dressing Changes as Need.  7.	Ice affected area as Needed  8.	Keep Dressing  Clean and dry.

## 2018-07-18 NOTE — DISCHARGE NOTE ADULT - CARE PROVIDER_API CALL
Darrell Giraldo), Orthopaedic Surgery  [address] [place] [address]  Phone: [phone]  Fax: [phone] Darrell Giraldo), Orthopaedic Surgery  [address] [place] [address]  Phone: [phone]  Fax: [phone]

## 2018-07-18 NOTE — DISCHARGE NOTE ADULT - HOSPITAL COURSE
The patient is a 83 year old F status post elective IMN for L Femoral Neck Stress Fracture. Patient presented to [facility] after being medically cleared for an elective surgical procedure. The patient was taken to the operating room on date mentioned above. Prophylactic antibiotics were started before the procedure and continued for 24 hours.  There were no complications during the procedure and patient tolerated the procedure well.  The patient was transferred to recovery room in stable condition and subsequently to surgical floor.  Patient was placed on ASA 325mg PO BID for anticoagulation.  All home medications were continued.  The patient received physical therapy daily and daily labs were followed.  The dressing was kept clean, dry, intact.  The rest of the hospital stay was unremarkable.

## 2018-07-18 NOTE — DISCHARGE NOTE ADULT - PATIENT PORTAL LINK FT
You can access the [facility] Portal, offered by [facility], by registering with the following website: [email or web address]

## 2018-07-18 NOTE — BRIEF OPERATIVE NOTE - PROCEDURE
<<-----Click on this checkbox to enter Procedure Femur fracture surgery  07/18/2018    Active  NFRANE

## 2018-07-18 NOTE — PROGRESS NOTE ADULT - ASSESSMENT
A/P:  84 yo F s/p ORIF of L Femur stress fx POD #0.  -pain control  -WBAT  -PT/OT  -DVT ppx  -am labs ordered  -Dr. Giraldo to see patient in the morning

## 2018-07-18 NOTE — DISCHARGE NOTE ADULT - MEDICATION SUMMARY - MEDICATIONS TO TAKE
I will START or STAY ON the medications listed below when I get home from the hospital:    oxyCODONE 5 mg oral tablet  -- 1 tab(s) by mouth every 6 hours MDD:4  -- Caution federal law prohibits the transfer of this drug to any person other  than the person for whom it was prescribed.  It is very important that you take or use this exactly as directed.  Do not skip doses or discontinue unless directed by your doctor.  May cause drowsiness.  Alcohol may intensify this effect.  Use care when operating dangerous machinery.  This prescription cannot be refilled.  Using more of this medication than prescribed may cause serious breathing problems.    -- Indication: For Pain    Ecotrin 325 mg oral delayed release tablet  -- 1 tab(s) by mouth 2 times a day MDD:2  -- Swallow whole.  Do not crush.  Take with food or milk.    -- Indication: For dvt p    Prolia 60 mg/mL subcutaneous solution  -- 2 X year   -- Indication: For home    omeprazole 40 mg oral delayed release capsule  -- 1 cap(s) by mouth once a day  -- Indication: For home med    Vagifem 10 mcg vaginal tablet  -- 1 tab(s) vaginally 2 times a week  -- Indication: For home med

## 2018-07-19 LAB
HCT VFR BLD CALC: 38.4 % — SIGNIFICANT CHANGE UP (ref 34.5–45)
HGB BLD-MCNC: 12.9 G/DL — SIGNIFICANT CHANGE UP (ref 11.5–15.5)

## 2018-07-19 RX ORDER — ASPIRIN/CALCIUM CARB/MAGNESIUM 324 MG
1 TABLET ORAL
Qty: 60 | Refills: 0
Start: 2018-07-19 | End: 2018-08-17

## 2018-07-19 RX ORDER — OXYCODONE HYDROCHLORIDE 5 MG/1
1 TABLET ORAL
Qty: 28 | Refills: 0
Start: 2018-07-19 | End: 2018-07-25

## 2018-07-19 RX ADMIN — OXYCODONE HYDROCHLORIDE 10 MILLIGRAM(S): 5 TABLET ORAL at 13:51

## 2018-07-19 RX ADMIN — OXYCODONE HYDROCHLORIDE 10 MILLIGRAM(S): 5 TABLET ORAL at 21:57

## 2018-07-19 RX ADMIN — OXYCODONE HYDROCHLORIDE 10 MILLIGRAM(S): 5 TABLET ORAL at 08:45

## 2018-07-19 RX ADMIN — OXYCODONE HYDROCHLORIDE 10 MILLIGRAM(S): 5 TABLET ORAL at 14:20

## 2018-07-19 RX ADMIN — Medication 325 MILLIGRAM(S): at 17:21

## 2018-07-19 RX ADMIN — Medication 325 MILLIGRAM(S): at 05:08

## 2018-07-19 RX ADMIN — PANTOPRAZOLE SODIUM 40 MILLIGRAM(S): 20 TABLET, DELAYED RELEASE ORAL at 05:08

## 2018-07-19 RX ADMIN — OXYCODONE HYDROCHLORIDE 10 MILLIGRAM(S): 5 TABLET ORAL at 22:39

## 2018-07-19 RX ADMIN — SODIUM CHLORIDE 125 MILLILITER(S): 9 INJECTION, SOLUTION INTRAVENOUS at 00:37

## 2018-07-19 RX ADMIN — OXYCODONE HYDROCHLORIDE 10 MILLIGRAM(S): 5 TABLET ORAL at 08:13

## 2018-07-19 RX ADMIN — Medication 1 TABLET(S): at 11:05

## 2018-07-19 RX ADMIN — Medication 100 MILLIGRAM(S): at 21:57

## 2018-07-19 RX ADMIN — Medication 100 MILLIGRAM(S): at 01:45

## 2018-07-19 RX ADMIN — HYDROMORPHONE HYDROCHLORIDE 0.5 MILLIGRAM(S): 2 INJECTION INTRAMUSCULAR; INTRAVENOUS; SUBCUTANEOUS at 01:00

## 2018-07-19 RX ADMIN — Medication 100 MILLIGRAM(S): at 13:21

## 2018-07-19 RX ADMIN — HYDROMORPHONE HYDROCHLORIDE 0.5 MILLIGRAM(S): 2 INJECTION INTRAMUSCULAR; INTRAVENOUS; SUBCUTANEOUS at 00:37

## 2018-07-19 RX ADMIN — Medication 100 MILLIGRAM(S): at 05:08

## 2018-07-19 NOTE — PHYSICAL THERAPY INITIAL EVALUATION ADULT - GENERAL OBSERVATIONS, REHAB EVAL
Pt rec'd in bed with family present. Agreeable to PT evaluation. Pt medicated prior to evaluation. Icepack on left hip.

## 2018-07-19 NOTE — PROGRESS NOTE ADULT - ASSESSMENT
a/P: 83F s/p L  [place] IMN POD 3  Analgesia  DVT ppx  PWB 50% in CAM  PT/OT  DC planning a/P: 83F s/p L IMN POD 3  Analgesia  DVT ppx  WBAT  PT/OT  DC planning

## 2018-07-19 NOTE — OCCUPATIONAL THERAPY INITIAL EVALUATION ADULT - PERTINENT HX OF CURRENT PROBLEM, REHAB EVAL
84 y/o female s/p ORIF/IM kell left femur on 7/18/18 by Dr. Giraldo due to +stress/hairline fx left femur.

## 2018-07-19 NOTE — ANESTHESIA FOLLOW-UP NOTE - NSEVALATION_GEN_ALL_CORE
No apparent complications or complaints regarding anesthesia care at this time
No apparent complications or complaints regarding anesthesia care at this time

## 2018-07-19 NOTE — OCCUPATIONAL THERAPY INITIAL EVALUATION ADULT - ADDITIONAL COMMENTS
Pt lives in a house with 3 steps to enter with a handrail. Bedroom and bathroom main floor. 12 steps with handrail to access laundry room downstairs. Pt has a bathtub with sliding doors with 2 grab bars. Spouse is retired and is able to assist patient upon d/c home. Pt owns a cane.

## 2018-07-19 NOTE — PHYSICAL THERAPY INITIAL EVALUATION ADULT - RANGE OF MOTION EXAMINATION, REHAB EVAL
Left hip reduced secondary to POD 1/bilateral upper extremity ROM was WFL (within functional limits)/bilateral lower extremity ROM was WFL (within functional limits)/deficits as listed below

## 2018-07-20 VITALS
HEART RATE: 93 BPM | SYSTOLIC BLOOD PRESSURE: 122 MMHG | DIASTOLIC BLOOD PRESSURE: 69 MMHG | RESPIRATION RATE: 17 BRPM | TEMPERATURE: 99 F | OXYGEN SATURATION: 100 %

## 2018-07-20 RX ADMIN — Medication 100 MILLIGRAM(S): at 13:08

## 2018-07-20 RX ADMIN — Medication 325 MILLIGRAM(S): at 05:25

## 2018-07-20 RX ADMIN — OXYCODONE HYDROCHLORIDE 5 MILLIGRAM(S): 5 TABLET ORAL at 13:07

## 2018-07-20 RX ADMIN — OXYCODONE HYDROCHLORIDE 10 MILLIGRAM(S): 5 TABLET ORAL at 09:00

## 2018-07-20 RX ADMIN — OXYCODONE HYDROCHLORIDE 10 MILLIGRAM(S): 5 TABLET ORAL at 03:14

## 2018-07-20 RX ADMIN — Medication 100 MILLIGRAM(S): at 05:25

## 2018-07-20 RX ADMIN — Medication 1 TABLET(S): at 11:08

## 2018-07-20 RX ADMIN — PANTOPRAZOLE SODIUM 40 MILLIGRAM(S): 20 TABLET, DELAYED RELEASE ORAL at 05:25

## 2018-07-20 NOTE — PROGRESS NOTE ADULT - SUBJECTIVE AND OBJECTIVE BOX
POD #2   No acute events overnight, pain controlled  Home today if possible    Vital Signs Last 24 Hrs  T(C): 36.9 (20 Jul 2018 05:32), Max: 37.2 (19 Jul 2018 20:51)  T(F): 98.4 (20 Jul 2018 05:32), Max: 99 (19 Jul 2018 20:51)  HR: 80 (20 Jul 2018 05:32) (71 - 82)  BP: 97/58 (20 Jul 2018 05:32) (97/58 - 123/52)  BP(mean): --  RR: 17 (20 Jul 2018 05:32) (16 - 17)  SpO2: 94% (20 Jul 2018 05:32) (94% - 96%)  Gen: NAD, AAOx3  Left Lower Extremity:  Dressing clean dry intact  +EHL/FHL/TA/GS  SILT L3-S1  +DP/PT Pulses  Compartments soft  No calf TTP B/L

## 2018-08-26 ENCOUNTER — EMERGENCY (EMERGENCY)
Facility: HOSPITAL | Age: 83
LOS: 1 days | Discharge: HOME | End: 2018-08-26
Attending: INTERNAL MEDICINE
Payer: MEDICARE

## 2018-08-26 VITALS
DIASTOLIC BLOOD PRESSURE: 102 MMHG | HEART RATE: 77 BPM | WEIGHT: 119.93 LBS | RESPIRATION RATE: 16 BRPM | OXYGEN SATURATION: 98 % | HEIGHT: 58 IN | SYSTOLIC BLOOD PRESSURE: 137 MMHG | TEMPERATURE: 98 F

## 2018-08-26 DIAGNOSIS — Z98.890 OTHER SPECIFIED POSTPROCEDURAL STATES: Chronic | ICD-10-CM

## 2018-08-26 DIAGNOSIS — Z41.9 ENCOUNTER FOR PROCEDURE FOR PURPOSES OTHER THAN REMEDYING HEALTH STATE, UNSPECIFIED: Chronic | ICD-10-CM

## 2018-08-26 PROBLEM — M19.90 UNSPECIFIED OSTEOARTHRITIS, UNSPECIFIED SITE: Chronic | Status: ACTIVE | Noted: 2018-07-10

## 2018-08-26 PROBLEM — M81.0 AGE-RELATED OSTEOPOROSIS WITHOUT CURRENT PATHOLOGICAL FRACTURE: Chronic | Status: ACTIVE | Noted: 2018-07-10

## 2018-08-26 PROBLEM — N81.4 UTEROVAGINAL PROLAPSE, UNSPECIFIED: Chronic | Status: ACTIVE | Noted: 2018-07-10

## 2018-08-26 PROBLEM — K90.0 CELIAC DISEASE: Chronic | Status: ACTIVE | Noted: 2018-07-10

## 2018-08-26 PROCEDURE — 12001 RPR S/N/AX/GEN/TRNK 2.5CM/<: CPT

## 2018-08-26 PROCEDURE — 73120 X-RAY EXAM OF HAND: CPT

## 2018-08-26 PROCEDURE — 99284 EMERGENCY DEPT VISIT MOD MDM: CPT | Mod: 25

## 2018-08-26 PROCEDURE — 73110 X-RAY EXAM OF WRIST: CPT

## 2018-08-26 PROCEDURE — 90715 TDAP VACCINE 7 YRS/> IM: CPT

## 2018-08-26 PROCEDURE — 73120 X-RAY EXAM OF HAND: CPT | Mod: 26,RT

## 2018-08-26 PROCEDURE — 73110 X-RAY EXAM OF WRIST: CPT | Mod: 26,RT

## 2018-08-26 PROCEDURE — 70450 CT HEAD/BRAIN W/O DYE: CPT | Mod: 26

## 2018-08-26 PROCEDURE — 90471 IMMUNIZATION ADMIN: CPT

## 2018-08-26 PROCEDURE — 70450 CT HEAD/BRAIN W/O DYE: CPT

## 2018-08-26 RX ORDER — TETANUS TOXOID, REDUCED DIPHTHERIA TOXOID AND ACELLULAR PERTUSSIS VACCINE, ADSORBED 5; 2.5; 8; 8; 2.5 [IU]/.5ML; [IU]/.5ML; UG/.5ML; UG/.5ML; UG/.5ML
0.5 SUSPENSION INTRAMUSCULAR ONCE
Qty: 0 | Refills: 0 | Status: COMPLETED | OUTPATIENT
Start: 2018-08-26 | End: 2018-08-26

## 2018-08-26 RX ADMIN — TETANUS TOXOID, REDUCED DIPHTHERIA TOXOID AND ACELLULAR PERTUSSIS VACCINE, ADSORBED 0.5 MILLILITER(S): 5; 2.5; 8; 8; 2.5 SUSPENSION INTRAMUSCULAR at 06:48

## 2018-08-26 NOTE — ED ADULT NURSE NOTE - NSIMPLEMENTINTERV_GEN_ALL_ED
Implemented All Fall with Harm Risk Interventions:  [place] to call system. Call bell, personal items and telephone within reach. Instruct patient to call for assistance. Room bathroom lighting operational. Non-slip footwear when patient is off stretcher. Physically safe environment: no spills, clutter or unnecessary equipment. Stretcher in lowest position, wheels locked, appropriate side rails in place. Provide visual cue, wrist band, yellow gown, etc. Monitor gait and stability. Monitor for mental status changes and reorient to person, place, and time. Review medications for side effects contributing to fall risk. Reinforce activity limits and safety measures with patient and family. Provide visual clues: red socks.

## 2018-08-26 NOTE — ED ADULT NURSE NOTE - PSH
Elective surgery  pessary placed  as outpatient  2016  H/O major orthopedic surgery  kell placed due to fx

## 2018-08-26 NOTE — ED ADULT NURSE REASSESSMENT NOTE - NS ED NURSE REASSESS COMMENT FT1
[facility] noted [facility] right [facility].  laceration on the posterior aspect of the head, no bleeding ntoed at this time.  patient taken for CT.  will continue to monitor.

## 2018-08-26 NOTE — ED ADULT NURSE NOTE - NSFALLRSKHRMRISKTYPE_ED_ALL_ED
other surgery(72hr postop, recent leg amputee, sig. abd/thor surg)/coagulation(Bleeding disorder R/T clinical cond/anti-coags)

## 2018-08-26 NOTE — ED ADULT NURSE NOTE - OBJECTIVE STATEMENT
Pt received alert and oriented x 4 c/o of head injury s/p falling while getting out of bed. Pt stated she hit the back of her head on bedroom furniture. Lac noted to back of head, abrasion noted to  right wrist, left upper back bruise noted. Pt states she only took 325 mg asa for post surgery in July 2018 kell placement in Left femur.

## 2018-08-26 NOTE — ED PROVIDER NOTE - OBJECTIVE STATEMENT
Pt got out of bed and fell hitting head-denies loc -lac to back of head-abrasions to right hand 84 y/o WF , Pt got out of bed, slipped while reaching for the walker C/C  fell hitting head-denies loc - she has an occipital scalp lac and abrasions to right hand. no HA, no neck pain, no neuro changes

## 2018-08-26 NOTE — ED PROVIDER NOTE - ENMT, MLM
Airway patent, Nasal mucosa clear. Mouth with normal mucosa. Throat has no vesicles, no oropharyngeal exudates and uvula is midline. occipital scalp laceration 2cm approximated with 1 staple

## 2018-09-24 ENCOUNTER — RX RENEWAL (OUTPATIENT)
Age: 83
End: 2018-09-24

## 2018-10-24 ENCOUNTER — APPOINTMENT (OUTPATIENT)
Dept: UROGYNECOLOGY | Facility: CLINIC | Age: 83
End: 2018-10-24
Payer: MEDICARE

## 2018-10-24 PROCEDURE — 97162 PT EVAL MOD COMPLEX 30 MIN: CPT

## 2018-10-24 PROCEDURE — 85027 COMPLETE CBC AUTOMATED: CPT

## 2018-10-24 PROCEDURE — 97165 OT EVAL LOW COMPLEX 30 MIN: CPT

## 2018-10-24 PROCEDURE — 86850 RBC ANTIBODY SCREEN: CPT

## 2018-10-24 PROCEDURE — 97116 GAIT TRAINING THERAPY: CPT

## 2018-10-24 PROCEDURE — 85018 HEMOGLOBIN: CPT

## 2018-10-24 PROCEDURE — C1713: CPT

## 2018-10-24 PROCEDURE — 86900 BLOOD TYPING SEROLOGIC ABO: CPT

## 2018-10-24 PROCEDURE — 76001: CPT

## 2018-10-24 PROCEDURE — 85014 HEMATOCRIT: CPT

## 2018-10-24 PROCEDURE — 80048 BASIC METABOLIC PNL TOTAL CA: CPT

## 2018-10-24 PROCEDURE — 99214 OFFICE O/P EST MOD 30 MIN: CPT

## 2018-10-24 PROCEDURE — C1769: CPT

## 2018-10-24 PROCEDURE — 97530 THERAPEUTIC ACTIVITIES: CPT

## 2018-10-24 PROCEDURE — 86901 BLOOD TYPING SEROLOGIC RH(D): CPT

## 2018-10-24 PROCEDURE — 97535 SELF CARE MNGMENT TRAINING: CPT

## 2018-11-27 ENCOUNTER — APPOINTMENT (OUTPATIENT)
Dept: ENDOCRINOLOGY | Facility: CLINIC | Age: 83
End: 2018-11-27

## 2018-11-30 ENCOUNTER — APPOINTMENT (OUTPATIENT)
Dept: ENDOCRINOLOGY | Facility: CLINIC | Age: 83
End: 2018-11-30
Payer: MEDICARE

## 2018-11-30 VITALS
BODY MASS INDEX: 26.03 KG/M2 | DIASTOLIC BLOOD PRESSURE: 72 MMHG | HEART RATE: 78 BPM | OXYGEN SATURATION: 98 % | WEIGHT: 124 LBS | SYSTOLIC BLOOD PRESSURE: 132 MMHG | HEIGHT: 58 IN

## 2018-11-30 PROCEDURE — 99215 OFFICE O/P EST HI 40 MIN: CPT

## 2018-11-30 RX ORDER — TRAMADOL HYDROCHLORIDE AND ACETAMINOPHEN 37.5; 325 MG/1; MG/1
37.5-325 TABLET, FILM COATED ORAL
Qty: 40 | Refills: 0 | Status: COMPLETED | COMMUNITY
Start: 2018-10-25

## 2018-12-03 ENCOUNTER — RX RENEWAL (OUTPATIENT)
Age: 83
End: 2018-12-03

## 2018-12-03 LAB
25(OH)D3 SERPL-MCNC: 49 NG/ML
ALBUMIN SERPL ELPH-MCNC: 4.4 G/DL
ALP BLD-CCNC: 43 U/L
ALT SERPL-CCNC: 22 U/L
ANION GAP SERPL CALC-SCNC: 14 MMOL/L
AST SERPL-CCNC: 32 U/L
BASOPHILS # BLD AUTO: 0.04 K/UL
BASOPHILS NFR BLD AUTO: 0.8 %
BILIRUB SERPL-MCNC: 0.2 MG/DL
BUN SERPL-MCNC: 13 MG/DL
CALCIUM SERPL-MCNC: 10.5 MG/DL
CALCIUM SERPL-MCNC: 10.5 MG/DL
CHLORIDE SERPL-SCNC: 101 MMOL/L
CHOLEST SERPL-MCNC: 243 MG/DL
CHOLEST/HDLC SERPL: 3.4 RATIO
CO2 SERPL-SCNC: 25 MMOL/L
COLLAGEN CTX SERPL-MCNC: 97 PG/ML
CREAT SERPL-MCNC: 0.71 MG/DL
EOSINOPHIL # BLD AUTO: 0.23 K/UL
EOSINOPHIL NFR BLD AUTO: 4.7 %
GLUCOSE SERPL-MCNC: 88 MG/DL
HCT VFR BLD CALC: 43 %
HDLC SERPL-MCNC: 72 MG/DL
HGB BLD-MCNC: 14.4 G/DL
IMM GRANULOCYTES NFR BLD AUTO: 0.2 %
LDLC SERPL CALC-MCNC: 155 MG/DL
LYMPHOCYTES # BLD AUTO: 1.43 K/UL
LYMPHOCYTES NFR BLD AUTO: 28.9 %
MAGNESIUM SERPL-MCNC: 2.2 MG/DL
MAN DIFF?: NORMAL
MCHC RBC-ENTMCNC: 32 PG
MCHC RBC-ENTMCNC: 33.5 GM/DL
MCV RBC AUTO: 95.6 FL
MONOCYTES # BLD AUTO: 0.59 K/UL
MONOCYTES NFR BLD AUTO: 11.9 %
NEUTROPHILS # BLD AUTO: 2.64 K/UL
NEUTROPHILS NFR BLD AUTO: 53.5 %
PARATHYROID HORMONE INTACT: 27 PG/ML
PLATELET # BLD AUTO: 238 K/UL
POTASSIUM SERPL-SCNC: 4.3 MMOL/L
PROT SERPL-MCNC: 7.4 G/DL
RBC # BLD: 4.5 M/UL
RBC # FLD: 14 %
SODIUM SERPL-SCNC: 140 MMOL/L
TRIGL SERPL-MCNC: 78 MG/DL
TSH SERPL-ACNC: 3.86 UIU/ML
WBC # FLD AUTO: 4.94 K/UL

## 2018-12-05 ENCOUNTER — APPOINTMENT (OUTPATIENT)
Dept: INTERNAL MEDICINE | Facility: CLINIC | Age: 83
End: 2018-12-05

## 2018-12-19 ENCOUNTER — APPOINTMENT (OUTPATIENT)
Dept: UROGYNECOLOGY | Facility: CLINIC | Age: 83
End: 2018-12-19
Payer: MEDICARE

## 2018-12-19 PROCEDURE — A4562: CPT

## 2018-12-19 PROCEDURE — 99214 OFFICE O/P EST MOD 30 MIN: CPT

## 2019-01-07 RX ORDER — ABALOPARATIDE 2000 UG/ML
3120 INJECTION, SOLUTION SUBCUTANEOUS
Qty: 1 | Refills: 0 | Status: COMPLETED | COMMUNITY
Start: 2018-11-30 | End: 2019-01-07

## 2019-01-09 ENCOUNTER — APPOINTMENT (OUTPATIENT)
Dept: UROGYNECOLOGY | Facility: CLINIC | Age: 84
End: 2019-01-09
Payer: MEDICARE

## 2019-01-09 DIAGNOSIS — T83.89XS OTHER SPECIFIED COMPLICATION OF GENITOURINARY PROSTHETIC DEVICES, IMPLANTS AND GRAFTS, SEQUELA: ICD-10-CM

## 2019-01-09 DIAGNOSIS — N89.8 OTHER SPECIFIED COMPLICATION OF GENITOURINARY PROSTHETIC DEVICES, IMPLANTS AND GRAFTS, SEQUELA: ICD-10-CM

## 2019-01-09 PROCEDURE — 57180 TREAT VAGINAL BLEEDING: CPT

## 2019-01-09 PROCEDURE — 99214 OFFICE O/P EST MOD 30 MIN: CPT | Mod: 25

## 2019-01-09 NOTE — PHYSICAL EXAM
[No Acute Distress] : in no acute distress [Well developed] : well developed [Well Nourished] : ~L well nourished [Good Hygeine] : demonstrates good hygeine [Oriented x3] : oriented to person, place, and time [Normal Memory] : ~T memory was ~L unimpaired [Normal Mood/Affect] : mood and affect are normal [Warm and Dry] : was warm and dry to touch [Normal Gait] : gait was normal [Labia Majora] : were normal [Labia Majora Erythema] : erythema [Labia Minora] : were normal [Mucosal Prolapse] : had a mucosal prolapse [Normal Appearance] : general appearance was normal [Atrophy] : atrophy [Aa ____] : Aa [unfilled] [Ba ____] : Ba [unfilled] [C ____] : C [unfilled] [GH ____] : GH [unfilled] [PB ____] : PB [unfilled] [TVL ____] : TVL  [unfilled] [Normal] : no abnormalities [Scant] : there was scant vaginal bleeding [Anxiety] : patient is not anxious [Tenderness] : ~T no ~M abdominal tenderness observed [Distended] : not distended [Inguinal LAD] : no adenopathy was noted in the inguinal lymph nodes [de-identified] : cervix scarred up

## 2019-01-09 NOTE — PROCEDURE
[Good Fit] : fits well [Erosion] : evidence of erosion [Pessary Out] : removed [Mild] : mild bleeding [Hemostatic Agent used (Silver Nitrate)] : a hemostatic agent (Silver Nitrate) was used to resolve bleeding [Refit] : refit is not needed [Erythema] : no erythema [Discharge] : no vaginal discharge [Infection] : no evidence of infection [FreeTextEntry1] : incontinence dish #5 [de-identified] : superficial excoriation posterior to cervix on right side and midline

## 2019-01-09 NOTE — HISTORY OF PRESENT ILLNESS
[Cystocele (Obstetric)] : none [Vaginal Wall Prolapse] : none [Rectal Prolapse] : none [Unable To Restrain Bowel Movement] : none [Urinary Frequency] : none [Feelings Of Urinary Urgency] : none [Urinary Tract Infection] : none [Constipation Obstructed Defecation] : none [Pelvic Pain] : none [Vaginal Pain] : none [Bladder Pain] : none [Rectal Pain] : none [Back Pain] : none [FreeTextEntry1] : \hyun Aleman presents for follow up. She has a h/o HANY and prolapse managed with an incontinence dish pessary which was increased in size to a #5 at her last vist. She had pessary removed in July due to superficial excoriation. She has been using Yuvafem and denies vaginal bleeding or pain. She denies side effects. She requests refill today. She is happy with her pessary and denies any issues.

## 2019-01-09 NOTE — DISCUSSION/SUMMARY
[FreeTextEntry1] : \par Love presents with a h/o pelvic organ prolapse, HANY and atrophy. On exam she was found to have a superficial excoriation with bleeding. Hemostasis achieved with silver nitrate\par -Exam findings and plan reviewed with patient. \par -Pelvic rest x 3-4 weeks\par -Continue Yuvafem, eRx provided today with refills. \par -RTO in 3-4 weeks for follow up, or sooner if issues arise.  All questions answered. \par

## 2019-01-29 ENCOUNTER — APPOINTMENT (OUTPATIENT)
Dept: ENDOCRINOLOGY | Facility: CLINIC | Age: 84
End: 2019-01-29
Payer: MEDICARE

## 2019-01-29 VITALS
OXYGEN SATURATION: 98 % | WEIGHT: 125 LBS | SYSTOLIC BLOOD PRESSURE: 126 MMHG | DIASTOLIC BLOOD PRESSURE: 70 MMHG | BODY MASS INDEX: 26.24 KG/M2 | HEART RATE: 78 BPM | HEIGHT: 58 IN

## 2019-01-29 PROCEDURE — 99214 OFFICE O/P EST MOD 30 MIN: CPT

## 2019-01-29 RX ORDER — CLOTRIMAZOLE AND BETAMETHASONE DIPROPIONATE 10; .5 MG/G; MG/G
1-0.05 CREAM TOPICAL TWICE DAILY
Qty: 1 | Refills: 1 | Status: DISCONTINUED | COMMUNITY
Start: 2017-09-06 | End: 2019-01-29

## 2019-01-29 NOTE — PHYSICAL EXAM
[Alert] : alert [No Acute Distress] : no acute distress [Well Nourished] : well nourished [Well Developed] : well developed [Normal Sclera/Conjunctiva] : normal sclera/conjunctiva [No Proptosis] : no proptosis [Normal Oropharynx] : the oropharynx was normal [Thyroid Not Enlarged] : the thyroid was not enlarged [No Thyroid Nodules] : there were no palpable thyroid nodules [No Respiratory Distress] : no respiratory distress [No Accessory Muscle Use] : no accessory muscle use [Clear to Auscultation] : lungs were clear to auscultation bilaterally [Normal Rate] : heart rate was normal  [Normal S1, S2] : normal S1 and S2 [Regular Rhythm] : with a regular rhythm [Normal Bowel Sounds] : normal bowel sounds [Not Tender] : non-tender [Soft] : abdomen soft [Not Distended] : not distended [Kyphosis] : kyphosis present [No Spinal Tenderness] : no spinal tenderness [No Stigmata of Cushings Syndrome] : no stigmata of cushings syndrome [Normal Gait] : normal gait [Normal Strength/Tone] : muscle strength and tone were normal [No Rash] : no rash [Normal Reflexes] : deep tendon reflexes were 2+ and symmetric [No Tremors] : no tremors [Oriented x3] : oriented to person, place, and time

## 2019-01-29 NOTE — REASON FOR VISIT
[Follow-Up: _____] : a [unfilled] follow-up visit [Spouse] : spouse [Family Member] : family member [FreeTextEntry1] : Osteoporosis

## 2019-01-29 NOTE — HISTORY OF PRESENT ILLNESS
[Teriparatide (Forteo)] : Teriparatide [Prolia (Denosumab)] : Prolia (Denosumab) [Patient taking Meds Correctly] : Patient is taking meds correctly [FreeTextEntry1] : f/u 84 y/o female with osteoporosis. \par \par Began Forteo 2/2013. Didn't fully tolerate due to minor injection site redness, lasting about 1/2 day. Changed to Prolia, started in February 2014. Last saw dentist 6 mons ago. No ONJ. BMD 2015 improved. BMD 4/2017 still severely low radius with sl decrease in fem neck but stable total hip.  Saw PCP for L hip pain, x-rays reported no fx at that time and she was dx'ed with tendonitis. Pt then had a L femoral hairline fx 2018  seen on MRI. Had ORIF [place] 6/2018, healed well. Details unclear, if this was mid-shaft or femoral neck. No mention in hosp record concerning possible atypical fx. \par \par Pt d/c Prolia and switched to Tymlos Nov 2018. Tolerating well. No interval fx. Occ dizziness in the morning, unrelated. Occ burning sensation when injecting.  [FreeTextEntry2] : Tymlos

## 2019-01-29 NOTE — REVIEW OF SYSTEMS
[Negative] : Heme/Lymph [All other systems negative] : All other systems negative [Joint Pain] : joint pain [Difficulty Walking] : difficulty walking [Chest Pain] : no chest pain [Shortness Of Breath] : no shortness of breath [Nausea] : no nausea [Vomiting] : no vomiting was observed

## 2019-01-29 NOTE — END OF VISIT
[FreeTextEntry3] : I, Lena Mckeon, authored this note working as a medical scribe for Dr. Mendieta.  01/29/2019. 12:00PM. \par This note was authored by Lena Mckeon working as medical scribe for me. I have reviewed, edited, and revised the note as needed. I am in agreement with the exam findings, imaging findings, and treatment plan.  Ramiro Mendieta MD

## 2019-01-29 NOTE — ASSESSMENT
[FreeTextEntry1] : 82 y/o female with severe osteoporosis. \par \par Pt was previously intolerant to Forteo due to mild site injection redness. Pt on Prolia since 2014. Taking correctly, tolerating well, no ONJ. BMD 2015 improved. BMD 2017 showed sl decrease in fem neck. BMD 2018 showed improvement in spine. Proximal radius is stable but severely low. Pt had been having hip pain and was told it was tendonitis. She then had further pain when walking and was in PT. She went to ortho in July 2018 and was dx with L femur fx. Had ORIF at [place]. The discharge note says it was a fem neck stress fx. d/c Prolia. Pt transitioned to Tylmos for more aggressive bone building treatment Nov 2018. Tolerating well. No interval fx. No ecchymosis at injection site. She occ experiences a burning sensation when she gives the injection, nothing major. Pt still has sl hip pain. She is ambulating with a cane and in PT. \par \par Tymlos [facility] coverage, discussed with pharmacist, prescription properly sent out. \par \par I request labs sent out today to include a marker of bone cell activity in relation to the osteoporosis rx (P1NP, CTX). f/u in 3 mons.  [Teriparatide Therapy] : Risks  and benefits of Teriparatide therapy were discussed with the patient including potential risk of osteosarcoma

## 2019-01-29 NOTE — PROCEDURE
[FreeTextEntry1] : Bone mineral density 5/24/18\par indication:  prior study showed rapid bone loss \par spine -2.3   osteopenia  +5.2%\par total hip -2.1 osteopenia  no significant change \par femoral neck -2.7 osteoporosis, no significant change \par proximal radius -5.4 osteoporosis, no significant change \par \par Bone density performed 5/2/17\par Lumbar spine- -2.7,osteoporosis, NS\par Femoral Neck- -2.8 osteoporosis, -6.3%\par Total Hip= -2.3 osteopenia, \par 1/3 radius -5.6 , osteoporosis,  NS\par \par Bone density performed 2.2015\par Lumbar spine- -2.8,osteoporosis, +3.0% versus 2013 \par Femoral Neck- -2.4 +5.2 vs 2013\par Total Hip= -2.2 osteopenia, or +4.9% vs 2013\par 1/3 radius -5.5 , osteoporosis, +3.5% vs 2013

## 2019-01-30 ENCOUNTER — APPOINTMENT (OUTPATIENT)
Dept: UROGYNECOLOGY | Facility: CLINIC | Age: 84
End: 2019-01-30
Payer: MEDICARE

## 2019-01-30 VITALS
WEIGHT: 125 LBS | SYSTOLIC BLOOD PRESSURE: 126 MMHG | HEART RATE: 74 BPM | DIASTOLIC BLOOD PRESSURE: 76 MMHG | RESPIRATION RATE: 14 BRPM | TEMPERATURE: 97.1 F | HEIGHT: 58 IN | BODY MASS INDEX: 26.24 KG/M2

## 2019-01-30 PROCEDURE — 99214 OFFICE O/P EST MOD 30 MIN: CPT

## 2019-01-30 NOTE — DISCUSSION/SUMMARY
[FreeTextEntry1] : \par Love presents with a h/o pelvic organ prolapse, HANY and atrophy. On exam her vaginal excoriation was healed and the pessary was reinserted\par -Pessary precuations and instructions reviewed\par -Continue Yuvafem biw and use Replens on nights when not using estrogen\par -RTO in 8 weeks for follow up, or sooner if issues arise.  All questions answered. \par

## 2019-01-30 NOTE — PROCEDURE
[Good Fit] : fits well [Pessary Inserted] : inserted [None] : no bleeding [Refit] : refit is not needed [Erosion] : no evidence of erosion [Erythema] : no erythema [Discharge] : no vaginal discharge [Infection] : no evidence of infection [FreeTextEntry1] : incontinence dish #5

## 2019-01-30 NOTE — PHYSICAL EXAM
[No Acute Distress] : in no acute distress [Well developed] : well developed [Well Nourished] : ~L well nourished [Good Hygeine] : demonstrates good hygeine [Oriented x3] : oriented to person, place, and time [Normal Memory] : ~T memory was ~L unimpaired [Normal Mood/Affect] : mood and affect are normal [Warm and Dry] : was warm and dry to touch [Normal Gait] : gait was normal [Labia Majora] : were normal [Labia Majora Erythema] : erythema [Labia Minora] : were normal [Mucosal Prolapse] : had a mucosal prolapse [Normal Appearance] : general appearance was normal [Atrophy] : atrophy [Scant] : there was scant vaginal bleeding [Aa ____] : Aa [unfilled] [Ba ____] : Ba [unfilled] [C ____] : C [unfilled] [GH ____] : GH [unfilled] [PB ____] : PB [unfilled] [TVL ____] : TVL  [unfilled] [Normal] : no abnormalities [Anxiety] : patient is not anxious [Tenderness] : ~T no ~M abdominal tenderness observed [Distended] : not distended [Inguinal LAD] : no adenopathy was noted in the inguinal lymph nodes [de-identified] : cervix scarred up

## 2019-01-30 NOTE — HISTORY OF PRESENT ILLNESS
[Cystocele (Obstetric)] : none [Vaginal Wall Prolapse] : none [Rectal Prolapse] : none [Unable To Restrain Bowel Movement] : frequent [] : days ago [Urinary Frequency] : none [Feelings Of Urinary Urgency] : none [Urinary Tract Infection] : none [Constipation Obstructed Defecation] : none [Pelvic Pain] : none [Vaginal Pain] : none [Bladder Pain] : none [Rectal Pain] : none [Back Pain] : none [FreeTextEntry5] : since pessary removal [de-identified] : since pessary removal [FreeTextEntry1] : \hyun Aleman presents for follow up. She has a h/o HANY and prolapse managed with an incontinence dish pessary. She has been undergoing pelvic rest since 1/9 due to vaginal irritation. She has been using Yuvafem and denies vaginal bleeding or pain. She denies side effects.

## 2019-01-31 LAB
ALBUMIN SERPL ELPH-MCNC: 4.5 G/DL
ALP BLD-CCNC: 68 U/L
ALT SERPL-CCNC: 17 U/L
ANION GAP SERPL CALC-SCNC: 12 MMOL/L
AST SERPL-CCNC: 25 U/L
BILIRUB SERPL-MCNC: 0.2 MG/DL
BUN SERPL-MCNC: 14 MG/DL
CALCIUM SERPL-MCNC: 10.4 MG/DL
CHLORIDE SERPL-SCNC: 101 MMOL/L
CO2 SERPL-SCNC: 28 MMOL/L
CREAT SERPL-MCNC: 0.69 MG/DL
GLUCOSE SERPL-MCNC: 80 MG/DL
POTASSIUM SERPL-SCNC: 4.1 MMOL/L
PROT SERPL-MCNC: 7.6 G/DL
SODIUM SERPL-SCNC: 142 MMOL/L

## 2019-02-01 LAB — COLLAGEN CTX SERPL-MCNC: 467 PG/ML

## 2019-02-06 LAB
MISCELLANEOUS TEST: NORMAL
PROC NAME: NORMAL

## 2019-03-06 ENCOUNTER — TRANSCRIPTION ENCOUNTER (OUTPATIENT)
Age: 84
End: 2019-03-06

## 2019-03-27 ENCOUNTER — APPOINTMENT (OUTPATIENT)
Dept: UROGYNECOLOGY | Facility: CLINIC | Age: 84
End: 2019-03-27
Payer: MEDICARE

## 2019-03-27 DIAGNOSIS — N39.3 STRESS INCONTINENCE (FEMALE) (MALE): ICD-10-CM

## 2019-03-27 DIAGNOSIS — N93.9 ABNORMAL UTERINE AND VAGINAL BLEEDING, UNSPECIFIED: ICD-10-CM

## 2019-03-27 PROCEDURE — 99214 OFFICE O/P EST MOD 30 MIN: CPT

## 2019-03-27 NOTE — DISCUSSION/SUMMARY
[FreeTextEntry1] : \par Love presents with a h/o pelvic organ prolapse, HANY and atrophy. On exam there is a small area of irritation ~1mm in size that is healed. Pessary reinserted\par -Pessary precautions and instructions reviewed\par -Continue Yuvafem biw and use Replens on nights when not using estrogen\par -If bleeding recurs, will remove pessary and have patient undergo pessary teaching so that she remove the pessary 2-3 nights per week and leave out overnight, with reinsertion in the morning. \par -RTO in 8 weeks for follow up, or sooner if issues arise.  All questions answered. \par

## 2019-03-27 NOTE — PHYSICAL EXAM
[No Acute Distress] : in no acute distress [Well developed] : well developed [Well Nourished] : ~L well nourished [Good Hygeine] : demonstrates good hygeine [Oriented x3] : oriented to person, place, and time [Normal Memory] : ~T memory was ~L unimpaired [Normal Mood/Affect] : mood and affect are normal [Warm and Dry] : was warm and dry to touch [Normal Gait] : gait was normal [Labia Majora] : were normal [Labia Majora Erythema] : erythema [Labia Minora] : were normal [Mucosal Prolapse] : had a mucosal prolapse [Normal Appearance] : general appearance was normal [Atrophy] : atrophy [Aa ____] : Aa [unfilled] [Ba ____] : Ba [unfilled] [C ____] : C [unfilled] [GH ____] : GH [unfilled] [PB ____] : PB [unfilled] [TVL ____] : TVL  [unfilled] [Normal] : no abnormalities [No Bleeding] : there was no active vaginal bleeding [Anxiety] : patient is not anxious [Tenderness] : ~T no ~M abdominal tenderness observed [Distended] : not distended [Inguinal LAD] : no adenopathy was noted in the inguinal lymph nodes [FreeTextEntry4] : 1 mm area of irritation, superficial and healed.  [de-identified] : cervix scarred up

## 2019-03-27 NOTE — HISTORY OF PRESENT ILLNESS
[Cystocele (Obstetric)] : none [Vaginal Wall Prolapse] : daily [Rectal Prolapse] : none [Unable To Restrain Bowel Movement] : frequent [] : days ago [Urinary Frequency] : none [Feelings Of Urinary Urgency] : none [Urinary Tract Infection] : none [Constipation Obstructed Defecation] : none [Pelvic Pain] : none [Vaginal Pain] : none [Bladder Pain] : none [Rectal Pain] : none [Back Pain] : none [FreeTextEntry5] : since pessary removal, removed pessary due to bleeding [de-identified] : since pessary removal [FreeTextEntry1] : \hyun Aleman presents for follow up. She has a h/o HANY and prolapse managed with an incontinence dish pessary. She reports bleeding 3 days ago. She removed the pessary and has left it out. She is using Yuvafem and denies further vaginal bleeding sicne removal. She denies side effects.

## 2019-04-04 ENCOUNTER — MESSAGE (OUTPATIENT)
Age: 84
End: 2019-04-04

## 2019-05-10 ENCOUNTER — APPOINTMENT (OUTPATIENT)
Dept: ENDOCRINOLOGY | Facility: CLINIC | Age: 84
End: 2019-05-10
Payer: MEDICARE

## 2019-05-10 VITALS
WEIGHT: 123 LBS | OXYGEN SATURATION: 96 % | DIASTOLIC BLOOD PRESSURE: 76 MMHG | SYSTOLIC BLOOD PRESSURE: 110 MMHG | HEIGHT: 58 IN | BODY MASS INDEX: 25.82 KG/M2 | HEART RATE: 66 BPM

## 2019-05-10 DIAGNOSIS — M81.0 AGE-RELATED OSTEOPOROSIS W/OUT CURRENT PATHOLOGICAL FRACTURE: ICD-10-CM

## 2019-05-10 PROCEDURE — 99214 OFFICE O/P EST MOD 30 MIN: CPT

## 2019-05-10 RX ORDER — PEN NEEDLE, DIABETIC 29 G X1/2"
32G X 4 MM NEEDLE, DISPOSABLE MISCELLANEOUS
Qty: 1 | Refills: 3 | Status: DISCONTINUED | COMMUNITY
Start: 2018-11-30 | End: 2019-05-10

## 2019-05-10 RX ORDER — ABALOPARATIDE 2000 UG/ML
3120 INJECTION, SOLUTION SUBCUTANEOUS
Qty: 3 | Refills: 3 | Status: DISCONTINUED | COMMUNITY
Start: 2018-11-30 | End: 2019-05-10

## 2019-05-10 RX ORDER — ALCOHOL ANTISEPTIC PADS
70 PADS, MEDICATED (EA) TOPICAL
Qty: 1 | Refills: 3 | Status: DISCONTINUED | COMMUNITY
Start: 2018-11-30 | End: 2019-05-10

## 2019-05-10 NOTE — PHYSICAL EXAM
[Alert] : alert [No Acute Distress] : no acute distress [Well Nourished] : well nourished [Well Developed] : well developed [Normal Sclera/Conjunctiva] : normal sclera/conjunctiva [No Proptosis] : no proptosis [Normal Oropharynx] : the oropharynx was normal [No Thyroid Nodules] : there were no palpable thyroid nodules [Thyroid Not Enlarged] : the thyroid was not enlarged [No Respiratory Distress] : no respiratory distress [Clear to Auscultation] : lungs were clear to auscultation bilaterally [No Accessory Muscle Use] : no accessory muscle use [Normal Rate] : heart rate was normal  [Normal S1, S2] : normal S1 and S2 [Normal Bowel Sounds] : normal bowel sounds [Regular Rhythm] : with a regular rhythm [Kyphosis] : kyphosis present [Not Distended] : not distended [Soft] : abdomen soft [Not Tender] : non-tender [No Spinal Tenderness] : no spinal tenderness [No Stigmata of Cushings Syndrome] : no stigmata of cushings syndrome [No Rash] : no rash [Normal Reflexes] : deep tendon reflexes were 2+ and symmetric [Oriented x3] : oriented to person, place, and time [No Tremors] : no tremors

## 2019-05-21 ENCOUNTER — OTHER (OUTPATIENT)
Age: 84
End: 2019-05-21

## 2019-05-22 ENCOUNTER — APPOINTMENT (OUTPATIENT)
Dept: UROGYNECOLOGY | Facility: CLINIC | Age: 84
End: 2019-05-22
Payer: MEDICARE

## 2019-05-22 PROCEDURE — 99214 OFFICE O/P EST MOD 30 MIN: CPT

## 2019-05-22 NOTE — PROCEDURE
[Good Fit] : fits well [Pessary Inserted] : inserted [None] : no bleeding [Pessary Washed] : washed [Refit] : refit is not needed [Erosion] : no evidence of erosion [Erythema] : no erythema [Discharge] : no vaginal discharge [Infection] : no evidence of infection [FreeTextEntry1] : incontinence dish #5

## 2019-05-22 NOTE — HISTORY OF PRESENT ILLNESS
[Cystocele (Obstetric)] : none [Rectal Prolapse] : none [Urinary Frequency] : none [Feelings Of Urinary Urgency] : none [Urinary Tract Infection] : none [Constipation Obstructed Defecation] : none [Pelvic Pain] : none [Vaginal Pain] : none [Bladder Pain] : none [Rectal Pain] : none [Back Pain] : none [Vaginal Wall Prolapse] : rare [Unable To Restrain Bowel Movement] : rare [FreeTextEntry1] : \hyun Aleman presents for follow up. She has a h/o HANY and prolapse managed with an incontinence dish pessary.  She is using Yuvafem and denies side effects. Today she reports having to remove the pessary on her own for several weeks after noticing vaginal spotting. Bleeding resolved with pessary out and did not recur when she reinserted it on her own. She reports being able to remove/insert on her own without difficulty.

## 2019-05-22 NOTE — PHYSICAL EXAM
[No Acute Distress] : in no acute distress [Well developed] : well developed [Well Nourished] : ~L well nourished [Good Hygeine] : demonstrates good hygeine [Oriented x3] : oriented to person, place, and time [Normal Memory] : ~T memory was ~L unimpaired [Normal Mood/Affect] : mood and affect are normal [Warm and Dry] : was warm and dry to touch [Normal Gait] : gait was normal [Labia Majora] : were normal [Labia Majora Erythema] : erythema [Labia Minora] : were normal [Mucosal Prolapse] : had a mucosal prolapse [Normal Appearance] : general appearance was normal [Atrophy] : atrophy [No Bleeding] : there was no active vaginal bleeding [Aa ____] : Aa [unfilled] [Ba ____] : Ba [unfilled] [C ____] : C [unfilled] [GH ____] : GH [unfilled] [PB ____] : PB [unfilled] [TVL ____] : TVL  [unfilled] [Normal] : no abnormalities [Anxiety] : patient is not anxious [Tenderness] : ~T no ~M abdominal tenderness observed [Distended] : not distended [Inguinal LAD] : no adenopathy was noted in the inguinal lymph nodes [de-identified] : cervix scarred up

## 2019-05-22 NOTE — DISCUSSION/SUMMARY
[FreeTextEntry1] : \par Love presents with a h/o pelvic organ prolapse, HANY and atrophy. \par -Continue Yuvafem biw and use Replens on nights when not using estrogen\par -Remove pessary two nights a week, leave out overnight and reinsert in morning. Will do this on nights she is using the yuvafem. \par -If bleeding recurs, will remove pessary and notify me\par -RTO in 12 weeks for follow up, or sooner if issues arise.  All questions answered. \par

## 2019-06-04 NOTE — ASSESSMENT
[Denosumab Therapy] : Risks  and benefits of denosumab therapy were discussed with the patient including eczema, cellulitis, osteonecrosis of the jaw and atypical femur fractures [FreeTextEntry1] : 84 y/o female with severe osteoporosis. \par \par Previously on Fosamax for many years. Pt was intolerant to Forteo due to mild site injection redness. Pt was on Prolia since 2014. Took correctly, tolerated well. BMD 2015 improved. BMD 2017 showed sl decrease in fem neck. BMD 2018 showed improvement in spine. Pt had hip pain and was told it was tendonitis. She then had further pain when walking and in PT. She went to ortho in July 2018 and was dx with L femur fx. Had ORIF at [facility]. 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 D/c Prolia. Pt transitioned to Tylmos Nov 2018. She was experiencing intolerance with each injection and d/c in April 2019.\par \par I recommend pt return to Prolia> [facility] if fx is not atypical. Pt will send reports over from ortho because it is unclear what type of fx she previously had. I will review the reports before making final decision. Risks and benefits discussed. All questions were answered. Likely f/u in 2 weeks with nurse.

## 2019-06-04 NOTE — END OF VISIT
[FreeTextEntry3] : I, Lena Mckeon, authored this note working as a medical scribe for Dr. Mendieta.  05/10/2019. 12:30PM. \par This note was authored by Lena Mckeon working as medical scribe for me. I have reviewed, edited, and revised the note as needed. I am in agreement with the exam findings, imaging findings, and treatment plan.  Ramiro Mendieta MD

## 2019-06-04 NOTE — REVIEW OF SYSTEMS
[Difficulty Walking] : difficulty walking [Joint Pain] : joint pain [Negative] : Heme/Lymph [All other systems negative] : All other systems negative [Chest Pain] : no chest pain [Shortness Of Breath] : no shortness of breath [Vomiting] : no vomiting was observed [Nausea] : no nausea [FreeTextEntry5] : TYRON

## 2019-06-04 NOTE — ADDENDUM
[FreeTextEntry1] : 6/4/19 Phone CD reviewed. 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 Recommend Evenity. Ramiro Mendieta MD

## 2019-06-04 NOTE — HISTORY OF PRESENT ILLNESS
[Teriparatide (Forteo)] : Teriparatide [Prolia (Denosumab)] : Prolia (Denosumab) [Alendronate (Fosomax)] : Alendronate [FreeTextEntry1] : Tymlos

## 2019-06-13 ENCOUNTER — APPOINTMENT (OUTPATIENT)
Dept: INTERNAL MEDICINE | Facility: CLINIC | Age: 84
End: 2019-06-13

## 2019-06-14 LAB
25(OH)D3 SERPL-MCNC: 48.9 NG/ML
ALBUMIN SERPL ELPH-MCNC: 4.3 G/DL
ALP BLD-CCNC: 64 U/L
ALT SERPL-CCNC: 21 U/L
ANION GAP SERPL CALC-SCNC: 12 MMOL/L
APPEARANCE: CLEAR
AST SERPL-CCNC: 25 U/L
BACTERIA: NEGATIVE
BASOPHILS # BLD AUTO: 0.07 K/UL
BASOPHILS NFR BLD AUTO: 1.3 %
BILIRUB SERPL-MCNC: 0.4 MG/DL
BILIRUBIN URINE: NEGATIVE
BLOOD URINE: NEGATIVE
BUN SERPL-MCNC: 12 MG/DL
CALCIUM SERPL-MCNC: 10.2 MG/DL
CHLORIDE SERPL-SCNC: 100 MMOL/L
CHOLEST SERPL-MCNC: 236 MG/DL
CHOLEST/HDLC SERPL: 3.5 RATIO
CO2 SERPL-SCNC: 26 MMOL/L
COLOR: NORMAL
CREAT SERPL-MCNC: 0.7 MG/DL
EOSINOPHIL # BLD AUTO: 0.2 K/UL
EOSINOPHIL NFR BLD AUTO: 3.6 %
ESTIMATED AVERAGE GLUCOSE: 108 MG/DL
GLUCOSE QUALITATIVE U: NEGATIVE
GLUCOSE SERPL-MCNC: 91 MG/DL
HBA1C MFR BLD HPLC: 5.4 %
HCT VFR BLD CALC: 43.7 %
HDLC SERPL-MCNC: 67 MG/DL
HGB BLD-MCNC: 14.6 G/DL
HYALINE CASTS: 0 /LPF
IMM GRANULOCYTES NFR BLD AUTO: 0.2 %
KETONES URINE: NEGATIVE
LDLC SERPL CALC-MCNC: 155 MG/DL
LEUKOCYTE ESTERASE URINE: ABNORMAL
LYMPHOCYTES # BLD AUTO: 1.58 K/UL
LYMPHOCYTES NFR BLD AUTO: 28.6 %
MAN DIFF?: NORMAL
MCHC RBC-ENTMCNC: 31.2 PG
MCHC RBC-ENTMCNC: 33.4 GM/DL
MCV RBC AUTO: 93.4 FL
MICROSCOPIC-UA: NORMAL
MONOCYTES # BLD AUTO: 0.73 K/UL
MONOCYTES NFR BLD AUTO: 13.2 %
NEUTROPHILS # BLD AUTO: 2.94 K/UL
NEUTROPHILS NFR BLD AUTO: 53.1 %
NITRITE URINE: NEGATIVE
PH URINE: 6.5
PLATELET # BLD AUTO: 400 K/UL
POTASSIUM SERPL-SCNC: 4.8 MMOL/L
PROT SERPL-MCNC: 6.6 G/DL
PROTEIN URINE: NEGATIVE
RBC # BLD: 4.68 M/UL
RBC # FLD: 13.7 %
RED BLOOD CELLS URINE: 2 /HPF
SODIUM SERPL-SCNC: 138 MMOL/L
SPECIFIC GRAVITY URINE: 1.01
SQUAMOUS EPITHELIAL CELLS: 4 /HPF
TRIGL SERPL-MCNC: 68 MG/DL
TSH SERPL-ACNC: 4.2 UIU/ML
UROBILINOGEN URINE: NORMAL
WBC # FLD AUTO: 5.53 K/UL
WHITE BLOOD CELLS URINE: 15 /HPF

## 2019-07-17 ENCOUNTER — NON-APPOINTMENT (OUTPATIENT)
Age: 84
End: 2019-07-17

## 2019-07-17 ENCOUNTER — APPOINTMENT (OUTPATIENT)
Dept: INTERNAL MEDICINE | Facility: CLINIC | Age: 84
End: 2019-07-17
Payer: MEDICARE

## 2019-07-17 VITALS
OXYGEN SATURATION: 92 % | SYSTOLIC BLOOD PRESSURE: 130 MMHG | WEIGHT: 121 LBS | TEMPERATURE: 97.5 F | RESPIRATION RATE: 14 BRPM | DIASTOLIC BLOOD PRESSURE: 80 MMHG | BODY MASS INDEX: 25.4 KG/M2 | HEART RATE: 37 BPM | HEIGHT: 58 IN

## 2019-07-17 DIAGNOSIS — Z00.00 ENCOUNTER FOR GENERAL ADULT MEDICAL EXAMINATION W/OUT ABNORMAL FINDINGS: ICD-10-CM

## 2019-07-17 DIAGNOSIS — Z23 ENCOUNTER FOR IMMUNIZATION: ICD-10-CM

## 2019-07-17 PROCEDURE — 90732 PPSV23 VACC 2 YRS+ SUBQ/IM: CPT

## 2019-07-17 PROCEDURE — G0439: CPT

## 2019-07-17 PROCEDURE — G0009: CPT

## 2019-07-17 PROCEDURE — 93000 ELECTROCARDIOGRAM COMPLETE: CPT

## 2019-07-17 NOTE — HISTORY OF PRESENT ILLNESS
[de-identified] : Pt is here for cpe. \par \par Dr Mendieta is recommending evenity. She has h/o fracture.

## 2019-07-17 NOTE — HEALTH RISK ASSESSMENT
[Intercurrent ED visits] : went to ED [No] : No [No falls in past year] : Patient reported no falls in the past year [0] : 2) Feeling down, depressed, or hopeless: Not at all (0) [] : No

## 2019-07-17 NOTE — PHYSICAL EXAM
[No Acute Distress] : no acute distress [Well Nourished] : well nourished [Well Developed] : well developed [Well-Appearing] : well-appearing [Normal Sclera/Conjunctiva] : normal sclera/conjunctiva [PERRL] : pupils equal round and reactive to light [EOMI] : extraocular movements intact [Normal Outer Ear/Nose] : the outer ears and nose were normal in appearance [Normal Oropharynx] : the oropharynx was normal [No JVD] : no jugular venous distention [No Lymphadenopathy] : no lymphadenopathy [Supple] : supple [Thyroid Normal, No Nodules] : the thyroid was normal and there were no nodules present [No Respiratory Distress] : no respiratory distress  [No Accessory Muscle Use] : no accessory muscle use [Clear to Auscultation] : lungs were clear to auscultation bilaterally [Normal Rate] : normal rate  [Regular Rhythm] : with a regular rhythm [Normal S1, S2] : normal S1 and S2 [No Murmur] : no murmur heard [No Carotid Bruits] : no carotid bruits [No Abdominal Bruit] : a ~M bruit was not heard ~T in the abdomen [No Varicosities] : no varicosities [Pedal Pulses Present] : the pedal pulses are present [No Edema] : there was no peripheral edema [No Palpable Aorta] : no palpable aorta [No Extremity Clubbing/Cyanosis] : no extremity clubbing/cyanosis [Normal Appearance] : normal in appearance [Soft] : abdomen soft [Non Tender] : non-tender [Non-distended] : non-distended [No Masses] : no abdominal mass palpated [No HSM] : no HSM [Normal Bowel Sounds] : normal bowel sounds [Normal Posterior Cervical Nodes] : no posterior cervical lymphadenopathy [Normal Anterior Cervical Nodes] : no anterior cervical lymphadenopathy [No CVA Tenderness] : no CVA  tenderness [No Spinal Tenderness] : no spinal tenderness [No Joint Swelling] : no joint swelling [Grossly Normal Strength/Tone] : grossly normal strength/tone [No Rash] : no rash [Coordination Grossly Intact] : coordination grossly intact [No Focal Deficits] : no focal deficits [Normal Gait] : normal gait [Deep Tendon Reflexes (DTR)] : deep tendon reflexes were 2+ and symmetric [Normal Affect] : the affect was normal [Normal Insight/Judgement] : insight and judgment were intact

## 2019-08-25 NOTE — PHYSICAL THERAPY INITIAL EVALUATION ADULT - GAIT TRAINING, PT EVAL
The patient is a 2y8m Female complaining of bloody sputum. Pt will be able to ambulate ~100 feet in 1 week with contact guard.

## 2019-09-04 ENCOUNTER — APPOINTMENT (OUTPATIENT)
Dept: UROGYNECOLOGY | Facility: CLINIC | Age: 84
End: 2019-09-04
Payer: MEDICARE

## 2019-09-04 PROCEDURE — 99214 OFFICE O/P EST MOD 30 MIN: CPT

## 2019-09-04 NOTE — PROCEDURE
[Good Fit] : fits well [Pessary Washed] : washed [Pessary Inserted] : inserted [None] : no bleeding [Refit] : refit is not needed [Erosion] : no evidence of erosion [Erythema] : no erythema [Discharge] : no vaginal discharge [Infection] : no evidence of infection [FreeTextEntry1] : incontinence dish #5

## 2019-09-04 NOTE — DISCUSSION/SUMMARY
[FreeTextEntry1] : \par Love presents for follow up with a h/o pelvic organ prolapse, HANY and atrophy. \par -Continue Yuvafem biw and use Replens on nights when not using estrogen\par -Remove pessary 1-2 times a week, leave out overnight and reinsert in morning. If unable to remove at least once weekly, will notify me and we will try changing to a different pessary (open ring with knob) to see if this is easier for her. \par -If bleeding recurs, will remove pessary and notify me\par -RTO in 12 weeks for follow up, or sooner if issues arise.  All questions answered. \par

## 2019-09-04 NOTE — PHYSICAL EXAM
[No Acute Distress] : in no acute distress [Well developed] : well developed [Well Nourished] : ~L well nourished [Good Hygeine] : demonstrates good hygeine [Oriented x3] : oriented to person, place, and time [Normal Memory] : ~T memory was ~L unimpaired [Normal Mood/Affect] : mood and affect are normal [Normal Gait] : gait was normal [Warm and Dry] : was warm and dry to touch [Labia Majora Erythema] : erythema [Labia Majora] : were normal [Labia Minora] : were normal [Mucosal Prolapse] : had a mucosal prolapse [Normal Appearance] : general appearance was normal [Atrophy] : atrophy [No Bleeding] : there was no active vaginal bleeding [Aa ____] : Aa [unfilled] [Ba ____] : Ba [unfilled] [C ____] : C [unfilled] [GH ____] : GH [unfilled] [PB ____] : PB [unfilled] [TVL ____] : TVL  [unfilled] [Normal] : normal [Anxiety] : patient is not anxious [Tenderness] : ~T no ~M abdominal tenderness observed [Distended] : not distended [de-identified] : cervix scarred up [Inguinal LAD] : no adenopathy was noted in the inguinal lymph nodes

## 2019-12-02 ENCOUNTER — APPOINTMENT (OUTPATIENT)
Dept: INTERNAL MEDICINE | Facility: CLINIC | Age: 84
End: 2019-12-02
Payer: MEDICARE

## 2019-12-02 VITALS
HEART RATE: 74 BPM | SYSTOLIC BLOOD PRESSURE: 144 MMHG | DIASTOLIC BLOOD PRESSURE: 84 MMHG | TEMPERATURE: 97.9 F | BODY MASS INDEX: 24.39 KG/M2 | WEIGHT: 121 LBS | OXYGEN SATURATION: 91 % | HEIGHT: 59 IN | RESPIRATION RATE: 14 BRPM

## 2019-12-02 DIAGNOSIS — Z23 ENCOUNTER FOR IMMUNIZATION: ICD-10-CM

## 2019-12-02 PROCEDURE — 99213 OFFICE O/P EST LOW 20 MIN: CPT | Mod: 25

## 2019-12-02 PROCEDURE — 90662 IIV NO PRSV INCREASED AG IM: CPT

## 2019-12-02 PROCEDURE — G0008: CPT

## 2019-12-02 NOTE — HISTORY OF PRESENT ILLNESS
[FreeTextEntry1] : renal and liver cysts [de-identified] : Pt had mri of lumbar spine on 11/7/19 and incidental findings were found of liver and right renal cyst. No symptoms.\par Does have back/ left hip area pain.

## 2019-12-04 ENCOUNTER — APPOINTMENT (OUTPATIENT)
Dept: UROGYNECOLOGY | Facility: CLINIC | Age: 84
End: 2019-12-04
Payer: MEDICARE

## 2019-12-04 PROCEDURE — 99214 OFFICE O/P EST MOD 30 MIN: CPT

## 2019-12-04 NOTE — HISTORY OF PRESENT ILLNESS
[Cystocele (Obstetric)] : none [Vaginal Wall Prolapse] : none [Rectal Prolapse] : none [Unable To Restrain Bowel Movement] : rare [Urinary Frequency] : none [Feelings Of Urinary Urgency] : none [Urinary Tract Infection] : none [Constipation Obstructed Defecation] : none [Pelvic Pain] : none [Vaginal Pain] : none [Bladder Pain] : none [Rectal Pain] : none [Back Pain] : none [FreeTextEntry1] : \hyun Aleman presents for follow up. She has a h/o HANY and prolapse. She is currently managed with an incontinence dish pessary. She is using Yuvafem and alternating with Replens. She started to remove pessary once weekly and leave out for 3-7 days. She reports this has decreased discharge and denies any vaginal spotting or bleeding with this regimen.

## 2019-12-04 NOTE — PHYSICAL EXAM
[No Acute Distress] : in no acute distress [Well developed] : well developed [Well Nourished] : ~L well nourished [Good Hygeine] : demonstrates good hygeine [Normal Memory] : ~T memory was ~L unimpaired [Oriented x3] : oriented to person, place, and time [Normal Mood/Affect] : mood and affect are normal [Warm and Dry] : was warm and dry to touch [Normal Gait] : gait was normal [Labia Majora] : were normal [Labia Majora Erythema] : erythema [Labia Minora] : were normal [Mucosal Prolapse] : had a mucosal prolapse [Normal Appearance] : general appearance was normal [Atrophy] : atrophy [No Bleeding] : there was no active vaginal bleeding [Aa ____] : Aa [unfilled] [C ____] : C [unfilled] [Ba ____] : Ba [unfilled] [GH ____] : GH [unfilled] [PB ____] : PB [unfilled] [TVL ____] : TVL  [unfilled] [Normal] : no abnormalities [Anxiety] : patient is not anxious [Tenderness] : ~T no ~M abdominal tenderness observed [Distended] : not distended [de-identified] : cervix scarred up [Inguinal LAD] : no adenopathy was noted in the inguinal lymph nodes

## 2019-12-04 NOTE — PROCEDURE
[Good Fit] : fits well [Pessary Inserted] : inserted [Pessary Washed] : washed [None] : no bleeding [Erosion] : no evidence of erosion [Refit] : refit is not needed [Discharge] : no vaginal discharge [Erythema] : no erythema [FreeTextEntry1] : incontinence dish #5 [Infection] : no evidence of infection

## 2019-12-04 NOTE — DISCUSSION/SUMMARY
[FreeTextEntry1] : \par Love presents for follow up with a h/o pelvic organ prolapse, HANY and atrophy. \par -Continue Yuvafem biw and use Replens on nights when not using estrogen\par -Continue pessary care regimen. \par - Pessary precautions and instructions reviewed \par -If bleeding, will remove pessary and notify me\par -RTO in 12 weeks for follow up, or sooner if issues arise.  All questions answered. \par

## 2019-12-30 ENCOUNTER — MEDICATION RENEWAL (OUTPATIENT)
Age: 84
End: 2019-12-30

## 2020-01-06 ENCOUNTER — RX RENEWAL (OUTPATIENT)
Age: 85
End: 2020-01-06

## 2020-01-20 ENCOUNTER — TRANSCRIPTION ENCOUNTER (OUTPATIENT)
Age: 85
End: 2020-01-20

## 2020-02-15 ENCOUNTER — TRANSCRIPTION ENCOUNTER (OUTPATIENT)
Age: 85
End: 2020-02-15

## 2020-02-15 ENCOUNTER — EMERGENCY (EMERGENCY)
Facility: HOSPITAL | Age: 85
LOS: 1 days | Discharge: HOME | End: 2020-02-15
Attending: EMERGENCY MEDICINE | Admitting: EMERGENCY MEDICINE
Payer: MEDICARE

## 2020-02-15 VITALS
TEMPERATURE: 98 F | SYSTOLIC BLOOD PRESSURE: 167 MMHG | OXYGEN SATURATION: 99 % | HEART RATE: 21 BPM | RESPIRATION RATE: 18 BRPM | HEIGHT: 59 IN | WEIGHT: 123.9 LBS | DIASTOLIC BLOOD PRESSURE: 78 MMHG

## 2020-02-15 VITALS
RESPIRATION RATE: 15 BRPM | OXYGEN SATURATION: 97 % | TEMPERATURE: 97 F | SYSTOLIC BLOOD PRESSURE: 168 MMHG | HEART RATE: 80 BPM | DIASTOLIC BLOOD PRESSURE: 78 MMHG

## 2020-02-15 DIAGNOSIS — Z98.890 OTHER SPECIFIED POSTPROCEDURAL STATES: Chronic | ICD-10-CM

## 2020-02-15 DIAGNOSIS — Z41.9 ENCOUNTER FOR PROCEDURE FOR PURPOSES OTHER THAN REMEDYING HEALTH STATE, UNSPECIFIED: Chronic | ICD-10-CM

## 2020-02-15 LAB
ALBUMIN SERPL ELPH-MCNC: 3.9 G/DL — SIGNIFICANT CHANGE UP (ref 3.3–5)
ALP SERPL-CCNC: 76 U/L — SIGNIFICANT CHANGE UP (ref 40–120)
ALT FLD-CCNC: 30 U/L — SIGNIFICANT CHANGE UP (ref 12–78)
ANION GAP SERPL CALC-SCNC: 9 MMOL/L — SIGNIFICANT CHANGE UP (ref 5–17)
APPEARANCE UR: CLEAR — SIGNIFICANT CHANGE UP
AST SERPL-CCNC: 28 U/L — SIGNIFICANT CHANGE UP (ref 15–37)
BILIRUB SERPL-MCNC: 0.5 MG/DL — SIGNIFICANT CHANGE UP (ref 0.2–1.2)
BILIRUB UR-MCNC: NEGATIVE — SIGNIFICANT CHANGE UP
BUN SERPL-MCNC: 13 MG/DL — SIGNIFICANT CHANGE UP (ref 7–23)
CALCIUM SERPL-MCNC: 10.1 MG/DL — SIGNIFICANT CHANGE UP (ref 8.5–10.1)
CHLORIDE SERPL-SCNC: 101 MMOL/L — SIGNIFICANT CHANGE UP (ref 96–108)
CO2 SERPL-SCNC: 26 MMOL/L — SIGNIFICANT CHANGE UP (ref 22–31)
COLOR SPEC: YELLOW — SIGNIFICANT CHANGE UP
CREAT SERPL-MCNC: 0.68 MG/DL — SIGNIFICANT CHANGE UP (ref 0.5–1.3)
DIFF PNL FLD: NEGATIVE — SIGNIFICANT CHANGE UP
GLUCOSE SERPL-MCNC: 98 MG/DL — SIGNIFICANT CHANGE UP (ref 70–99)
GLUCOSE UR QL: NEGATIVE — SIGNIFICANT CHANGE UP
HCT VFR BLD CALC: 46.7 % — HIGH (ref 34.5–45)
HGB BLD-MCNC: 15.9 G/DL — HIGH (ref 11.5–15.5)
KETONES UR-MCNC: ABNORMAL
LEUKOCYTE ESTERASE UR-ACNC: ABNORMAL
LIDOCAIN IGE QN: 236 U/L — SIGNIFICANT CHANGE UP (ref 73–393)
MCHC RBC-ENTMCNC: 31.1 PG — SIGNIFICANT CHANGE UP (ref 27–34)
MCHC RBC-ENTMCNC: 34 GM/DL — SIGNIFICANT CHANGE UP (ref 32–36)
MCV RBC AUTO: 91.4 FL — SIGNIFICANT CHANGE UP (ref 80–100)
NITRITE UR-MCNC: NEGATIVE — SIGNIFICANT CHANGE UP
NRBC # BLD: 0 /100 WBCS — SIGNIFICANT CHANGE UP (ref 0–0)
PH UR: 6 — SIGNIFICANT CHANGE UP (ref 5–8)
PLATELET # BLD AUTO: 409 K/UL — HIGH (ref 150–400)
POTASSIUM SERPL-MCNC: 3.9 MMOL/L — SIGNIFICANT CHANGE UP (ref 3.5–5.3)
POTASSIUM SERPL-SCNC: 3.9 MMOL/L — SIGNIFICANT CHANGE UP (ref 3.5–5.3)
PROT SERPL-MCNC: 8.1 G/DL — SIGNIFICANT CHANGE UP (ref 6–8.3)
PROT UR-MCNC: NEGATIVE — SIGNIFICANT CHANGE UP
RBC # BLD: 5.11 M/UL — SIGNIFICANT CHANGE UP (ref 3.8–5.2)
RBC # FLD: 12.7 % — SIGNIFICANT CHANGE UP (ref 10.3–14.5)
SODIUM SERPL-SCNC: 136 MMOL/L — SIGNIFICANT CHANGE UP (ref 135–145)
SP GR SPEC: 1.01 — SIGNIFICANT CHANGE UP (ref 1.01–1.02)
UROBILINOGEN FLD QL: NEGATIVE — SIGNIFICANT CHANGE UP
WBC # BLD: 7.31 K/UL — SIGNIFICANT CHANGE UP (ref 3.8–10.5)
WBC # FLD AUTO: 7.31 K/UL — SIGNIFICANT CHANGE UP (ref 3.8–10.5)

## 2020-02-15 PROCEDURE — 36415 COLL VENOUS BLD VENIPUNCTURE: CPT

## 2020-02-15 PROCEDURE — 85027 COMPLETE CBC AUTOMATED: CPT

## 2020-02-15 PROCEDURE — 74177 CT ABD & PELVIS W/CONTRAST: CPT

## 2020-02-15 PROCEDURE — 99284 EMERGENCY DEPT VISIT MOD MDM: CPT

## 2020-02-15 PROCEDURE — 96374 THER/PROPH/DIAG INJ IV PUSH: CPT | Mod: XU

## 2020-02-15 PROCEDURE — 81001 URINALYSIS AUTO W/SCOPE: CPT

## 2020-02-15 PROCEDURE — 99284 EMERGENCY DEPT VISIT MOD MDM: CPT | Mod: 25

## 2020-02-15 PROCEDURE — 96375 TX/PRO/DX INJ NEW DRUG ADDON: CPT

## 2020-02-15 PROCEDURE — 74177 CT ABD & PELVIS W/CONTRAST: CPT | Mod: 26

## 2020-02-15 PROCEDURE — 83690 ASSAY OF LIPASE: CPT

## 2020-02-15 PROCEDURE — 80053 COMPREHEN METABOLIC PANEL: CPT

## 2020-02-15 RX ORDER — FAMOTIDINE 10 MG/ML
20 INJECTION INTRAVENOUS ONCE
Refills: 0 | Status: COMPLETED | OUTPATIENT
Start: 2020-02-15 | End: 2020-02-15

## 2020-02-15 RX ORDER — CEFUROXIME AXETIL 250 MG
1 TABLET ORAL
Qty: 14 | Refills: 0
Start: 2020-02-15 | End: 2020-02-21

## 2020-02-15 RX ORDER — IOHEXOL 300 MG/ML
30 INJECTION, SOLUTION INTRAVENOUS ONCE
Refills: 0 | Status: COMPLETED | OUTPATIENT
Start: 2020-02-15 | End: 2020-02-15

## 2020-02-15 RX ORDER — SODIUM CHLORIDE 9 MG/ML
1000 INJECTION INTRAMUSCULAR; INTRAVENOUS; SUBCUTANEOUS ONCE
Refills: 0 | Status: COMPLETED | OUTPATIENT
Start: 2020-02-15 | End: 2020-02-15

## 2020-02-15 RX ORDER — ONDANSETRON 8 MG/1
4 TABLET, FILM COATED ORAL ONCE
Refills: 0 | Status: COMPLETED | OUTPATIENT
Start: 2020-02-15 | End: 2020-02-15

## 2020-02-15 RX ORDER — CEFUROXIME AXETIL 250 MG
500 TABLET ORAL ONCE
Refills: 0 | Status: COMPLETED | OUTPATIENT
Start: 2020-02-15 | End: 2020-02-15

## 2020-02-15 RX ADMIN — FAMOTIDINE 20 MILLIGRAM(S): 10 INJECTION INTRAVENOUS at 13:51

## 2020-02-15 RX ADMIN — Medication 500 MILLIGRAM(S): at 17:42

## 2020-02-15 RX ADMIN — IOHEXOL 30 MILLILITER(S): 300 INJECTION, SOLUTION INTRAVENOUS at 13:51

## 2020-02-15 RX ADMIN — ONDANSETRON 4 MILLIGRAM(S): 8 TABLET, FILM COATED ORAL at 13:51

## 2020-02-15 RX ADMIN — SODIUM CHLORIDE 1000 MILLILITER(S): 9 INJECTION INTRAMUSCULAR; INTRAVENOUS; SUBCUTANEOUS at 13:51

## 2020-02-15 NOTE — ED PROVIDER NOTE - CHPI ED SYMPTOMS NEG
no burning urination/no dysuria/no blood in stool/no chills/no fever/no hematuria/no diarrhea/no vomiting

## 2020-02-15 NOTE — ED PROVIDER NOTE - OBJECTIVE STATEMENT
83 yo white female with few months of intermittent abdominal bloating sensation but over the past 1-2 days has noted ill defined non radiating right sided abdominal pains with slight nausea but no vomiting, diarrhea, dysuria, hematuria, fever or chills. Normal BM today w/o black or blood feces.

## 2020-02-15 NOTE — ED PROVIDER NOTE - CARE PROVIDER_API CALL
Makayla Wang)  Internal Medicine  [address] [place] [address]  Phone: [phone]  Fax: [phone]  Follow Up Time: 1-3 Days

## 2020-02-15 NOTE — ED PROVIDER NOTE - CONSTITUTIONAL, MLM
normal... Well appearing elderly white female, awake, alert, oriented to person, place, time/situation and in no apparent distress.

## 2020-02-15 NOTE — ED PROVIDER NOTE - PATIENT PORTAL LINK FT
You can access the FollowMyHealth Patient Portal offered by [facility] by registering at the following website: [email or web address]. By joining [email or web address]’s FollowMyHealth portal, you will also be able to view your health information using other applications (apps) compatible with our system.

## 2020-02-15 NOTE — ED PROVIDER NOTE - PROGRESS NOTE DETAILS
Reevaluated patient at bedside.  Patient feeling well.  Discussed the results of all diagnostic testing in ED and copies of all reports given.  Patient relates was taking omeprazole, stopped taking because felt well, will restart now. An opportunity to ask questions was given.  Discussed the importance of prompt, close medical follow-up.  Patient will return with any changes, concerns or persistent / worsening symptoms.  Understanding of all instructions verbalized.

## 2020-02-15 NOTE — ED PROVIDER NOTE - CARE PROVIDERS DIRECT ADDRESSES
[email or web address]

## 2020-02-15 NOTE — ED ADULT NURSE NOTE - NSIMPLEMENTINTERV_GEN_ALL_ED
Implemented All Universal Safety Interventions:  [place] to call system. Call bell, personal items and telephone within reach. Instruct patient to call for assistance. Room bathroom lighting operational. Non-slip footwear when patient is off stretcher. Physically safe environment: no spills, clutter or unnecessary equipment. Stretcher in lowest position, wheels locked, appropriate side rails in place.

## 2020-02-18 ENCOUNTER — APPOINTMENT (OUTPATIENT)
Dept: INTERNAL MEDICINE | Facility: CLINIC | Age: 85
End: 2020-02-18
Payer: MEDICARE

## 2020-02-18 VITALS
BODY MASS INDEX: 25.2 KG/M2 | RESPIRATION RATE: 14 BRPM | WEIGHT: 125 LBS | TEMPERATURE: 97.4 F | DIASTOLIC BLOOD PRESSURE: 80 MMHG | HEIGHT: 59 IN | SYSTOLIC BLOOD PRESSURE: 154 MMHG

## 2020-02-18 VITALS — OXYGEN SATURATION: 96 %

## 2020-02-18 DIAGNOSIS — K29.80 DUODENITIS W/OUT BLEEDING: ICD-10-CM

## 2020-02-18 PROCEDURE — 99213 OFFICE O/P EST LOW 20 MIN: CPT

## 2020-02-18 NOTE — ASSESSMENT
[FreeTextEntry1] : Duodenitis: Continue ceftin. Advance diet as tolerated. \par Elevated BP: Recheck 1 month. \par

## 2020-02-18 NOTE — PHYSICAL EXAM
[No Acute Distress] : no acute distress [No Respiratory Distress] : no respiratory distress  [No Accessory Muscle Use] : no accessory muscle use [Clear to Auscultation] : lungs were clear to auscultation bilaterally [Normal Rate] : normal rate  [Regular Rhythm] : with a regular rhythm [Normal S1, S2] : normal S1 and S2 [Soft] : abdomen soft [Non Tender] : non-tender [Non-distended] : non-distended [Normal Bowel Sounds] : normal bowel sounds [No CVA Tenderness] : no CVA  tenderness

## 2020-02-18 NOTE — REVIEW OF SYSTEMS
[Fever] : no fever [Chills] : no chills [Chest Pain] : no chest pain [Night Sweats] : no night sweats [Palpitations] : no palpitations [Lower Ext Edema] : no lower extremity edema [Shortness Of Breath] : no shortness of breath [Wheezing] : no wheezing [Cough] : no cough [Dyspnea on Exertion] : no dyspnea on exertion [Abdominal Pain] : abdominal pain [Nausea] : no nausea [Constipation] : no constipation [Vomiting] : no vomiting [Diarrhea] : diarrhea [Dysuria] : no dysuria

## 2020-02-18 NOTE — HISTORY OF PRESENT ILLNESS
[de-identified] : Presents for follow-up after presenting to ED on 2/15 with ab pain. CT a/p showed duodenitis and possible UTI. She was discharged with ceftin and has improved since then. Pain is resolving and she is tolerating PO. Denies fever, chills, N/V. \par

## 2020-02-20 ENCOUNTER — APPOINTMENT (OUTPATIENT)
Dept: INTERNAL MEDICINE | Facility: CLINIC | Age: 85
End: 2020-02-20

## 2020-03-04 ENCOUNTER — APPOINTMENT (OUTPATIENT)
Dept: UROGYNECOLOGY | Facility: CLINIC | Age: 85
End: 2020-03-04
Payer: MEDICARE

## 2020-03-04 PROCEDURE — 99214 OFFICE O/P EST MOD 30 MIN: CPT

## 2020-03-04 NOTE — DISCUSSION/SUMMARY
[Reviewed Radiology Report(s)] : Radiology reports were reviewed. [FreeTextEntry1] : \par Love presents for follow up with a h/o pelvic organ prolapse, HANY and atrophy. She underwent recent CT which revealed mild fullness in right collecting system with mild enhancement. No lesions or masses. \par -We discussed her nonspecific CT findings and reassurance provided. Recommend repeat imaging urinary tract in 6 months, will do renal sonogram. If she develops hematuria, or any new urinary symptoms she will notify me immediately. \par -Continue Yuvafem biw and use Replens on nights when not using estrogen\par -Continue pessary care regimen. \par - Pessary precautions and instructions reviewed \par -If bleeding, will remove pessary and notify me\par -RTO in 12 weeks for follow up, or sooner if issues arise. Will order renal sonogram at next visit.  All questions answered. \par

## 2020-03-04 NOTE — HISTORY OF PRESENT ILLNESS
[Cystocele (Obstetric)] : none [Vaginal Wall Prolapse] : none [Rectal Prolapse] : none [Urinary Frequency] : none [Unable To Restrain Bowel Movement] : rare [Feelings Of Urinary Urgency] : none [Urinary Tract Infection] : none [Constipation Obstructed Defecation] : none [Vaginal Pain] : none [Pelvic Pain] : none [Bladder Pain] : none [Rectal Pain] : none [Back Pain] : none [FreeTextEntry1] : \hyun Aleman presents for follow up. She has a h/o HANY and prolapse. She is currently managed with an incontinence dish pessary. She is using Yuvafem and alternating with Replens. She continues to remove the pessary once weekly and leaves it out for 3-7 days. She reports this has decreased discharge and denies any vaginal spotting or bleeding with this regimen. \par \par Since her last visit she was evaluated in ER for abdominal pain. She was diagnosed with diverticulitis. She underwent CT A/P and mild fullness in right renal collecting system was noted. No masses or lesions identified within the urinary tract. She denies any new urinary symptoms, flank or bladder pain and hematuria. We discussed these nonspecific findings extensively.

## 2020-03-04 NOTE — PHYSICAL EXAM
[No Acute Distress] : in no acute distress [Well developed] : well developed [Well Nourished] : ~L well nourished [Good Hygeine] : demonstrates good hygeine [Oriented x3] : oriented to person, place, and time [Normal Memory] : ~T memory was ~L unimpaired [Normal Mood/Affect] : mood and affect are normal [Warm and Dry] : was warm and dry to touch [Normal Gait] : gait was normal [Labia Majora] : were normal [Labia Majora Erythema] : erythema [Labia Minora] : were normal [Mucosal Prolapse] : had a mucosal prolapse [Normal Appearance] : general appearance was normal [Atrophy] : atrophy [No Bleeding] : there was no active vaginal bleeding [Aa ____] : Aa [unfilled] [Ba ____] : Ba [unfilled] [C ____] : C [unfilled] [GH ____] : GH [unfilled] [PB ____] : PB [unfilled] [TVL ____] : TVL  [unfilled] [Normal] : no abnormalities [Distended] : not distended [Anxiety] : patient is not anxious [Tenderness] : ~T no ~M abdominal tenderness observed [Inguinal LAD] : no adenopathy was noted in the inguinal lymph nodes [de-identified] : cervix scarred up

## 2020-03-04 NOTE — PROCEDURE
[Good Fit] : fits well [Pessary Inserted] : inserted [Pessary Washed] : washed [None] : no bleeding [Erosion] : no evidence of erosion [Refit] : refit is not needed [Erythema] : no erythema [Discharge] : no vaginal discharge [FreeTextEntry1] : incontinence dish #5 [Infection] : no evidence of infection

## 2020-04-09 DIAGNOSIS — N28.1 CYST OF KIDNEY, ACQUIRED: ICD-10-CM

## 2020-06-10 ENCOUNTER — APPOINTMENT (OUTPATIENT)
Dept: UROGYNECOLOGY | Facility: CLINIC | Age: 85
End: 2020-06-10
Payer: MEDICARE

## 2020-06-10 VITALS — TEMPERATURE: 97.6 F

## 2020-06-10 PROCEDURE — 99214 OFFICE O/P EST MOD 30 MIN: CPT

## 2020-06-10 NOTE — PHYSICAL EXAM
[No Acute Distress] : in no acute distress [Well developed] : well developed [Well Nourished] : ~L well nourished [Good Hygeine] : demonstrates good hygeine [Oriented x3] : oriented to person, place, and time [Normal Memory] : ~T memory was ~L unimpaired [Normal Mood/Affect] : mood and affect are normal [Warm and Dry] : was warm and dry to touch [Normal Gait] : gait was normal [Labia Majora] : were normal [Labia Majora Erythema] : erythema [Labia Minora] : were normal [Mucosal Prolapse] : had a mucosal prolapse [Normal Appearance] : general appearance was normal [Atrophy] : atrophy [No Bleeding] : there was no active vaginal bleeding [Aa ____] : Aa [unfilled] [Ba ____] : Ba [unfilled] [C ____] : C [unfilled] [GH ____] : GH [unfilled] [PB ____] : PB [unfilled] [TVL ____] : TVL  [unfilled] [Normal] : no abnormalities [Anxiety] : patient is not anxious [Tenderness] : ~T no ~M abdominal tenderness observed [Distended] : not distended [Inguinal LAD] : no adenopathy was noted in the inguinal lymph nodes [de-identified] : cervix scarred up

## 2020-06-10 NOTE — HISTORY OF PRESENT ILLNESS
[Vaginal Wall Prolapse] : no [Rectal Prolapse] : no [Unable To Restrain Bowel Movement] : no [Urinary Frequency] : no [Feelings Of Urinary Urgency] : no [Urinary Tract Infection] : no [Constipation Obstructed Defecation] : no [FreeTextEntry1] : \hyun Aleman presents for follow up of  HANY and prolapse. She is currently undergoing treatment with an incontinence dish pessary. She is using Yuvafem biw and denies side effects. She continues to remove the pessary once weekly and leaves it out for several days. She reports this has decreased discharge and denies any vaginal spotting or bleeding with this regimen. \par \par At her last visit we reviewed recent imaging she had. She underwent CT A/P 11/19 and mild fullness in right renal collecting system was noted. No masses or lesions identified within the urinary tract. She denies any new urinary symptoms, flank or bladder pain and hematuria.

## 2020-06-10 NOTE — DISCUSSION/SUMMARY
[FreeTextEntry1] : \par Love presents for follow up with a h/o pelvic organ prolapse, HANY and atrophy. She underwent recent CT which revealed mild fullness in right collecting system with mild enhancement. No lesions or masses. \par - Recommend repeat imaging, will do renal sonogram. Rx provided. \par -Continue Yuvafem biw \par -Continue pessary care regimen. \par - Pessary precautions and instructions reviewed \par -If bleeding, will remove pessary and notify me\par -RTO in 12 weeks for follow up, or sooner if issues arise.  All questions answered. \par

## 2020-06-10 NOTE — PROCEDURE
[Good Fit] : fits well [Pessary Inserted] : inserted [Pessary Washed] : washed [None] : no bleeding [Refit] : refit is not needed [Erosion] : no evidence of erosion [Erythema] : no erythema [Discharge] : no vaginal discharge [Infection] : no evidence of infection [FreeTextEntry1] : incontinence dish #5

## 2020-06-16 ENCOUNTER — OUTPATIENT (OUTPATIENT)
Dept: OUTPATIENT SERVICES | Facility: HOSPITAL | Age: 85
LOS: 1 days | End: 2020-06-16
Payer: MEDICARE

## 2020-06-16 ENCOUNTER — APPOINTMENT (OUTPATIENT)
Dept: ULTRASOUND IMAGING | Facility: CLINIC | Age: 85
End: 2020-06-16
Payer: MEDICARE

## 2020-06-16 DIAGNOSIS — Z41.9 ENCOUNTER FOR PROCEDURE FOR PURPOSES OTHER THAN REMEDYING HEALTH STATE, UNSPECIFIED: Chronic | ICD-10-CM

## 2020-06-16 DIAGNOSIS — R93.5 ABNORMAL FINDINGS ON DIAGNOSTIC IMAGING OF OTHER ABDOMINAL REGIONS, INCLUDING RETROPERITONEUM: ICD-10-CM

## 2020-06-16 DIAGNOSIS — Z98.890 OTHER SPECIFIED POSTPROCEDURAL STATES: Chronic | ICD-10-CM

## 2020-06-16 PROCEDURE — 76775 US EXAM ABDO BACK WALL LIM: CPT

## 2020-06-16 PROCEDURE — 76775 US EXAM ABDO BACK WALL LIM: CPT | Mod: 26

## 2020-07-20 ENCOUNTER — APPOINTMENT (OUTPATIENT)
Dept: INTERNAL MEDICINE | Facility: CLINIC | Age: 85
End: 2020-07-20
Payer: MEDICARE

## 2020-07-20 VITALS
BODY MASS INDEX: 24.6 KG/M2 | HEIGHT: 59 IN | HEART RATE: 67 BPM | OXYGEN SATURATION: 97 % | TEMPERATURE: 97.6 F | DIASTOLIC BLOOD PRESSURE: 60 MMHG | RESPIRATION RATE: 14 BRPM | SYSTOLIC BLOOD PRESSURE: 130 MMHG | WEIGHT: 122 LBS

## 2020-07-20 DIAGNOSIS — Z12.39 ENCOUNTER FOR OTHER SCREENING FOR MALIGNANT NEOPLASM OF BREAST: ICD-10-CM

## 2020-07-20 PROCEDURE — G0439: CPT

## 2020-07-20 PROCEDURE — 36415 COLL VENOUS BLD VENIPUNCTURE: CPT

## 2020-07-20 RX ORDER — OMEPRAZOLE 20 MG/1
20 CAPSULE, DELAYED RELEASE ORAL
Qty: 30 | Refills: 0 | Status: DISCONTINUED | COMMUNITY
Start: 2018-07-17 | End: 2020-07-20

## 2020-07-20 NOTE — HEALTH RISK ASSESSMENT
[No] : No [0] : 2) Feeling down, depressed, or hopeless: Not at all (0) [Patient reported mammogram was normal] : Patient reported mammogram was normal [None] : None [With Family] : lives with family [] :  [Feels Safe at Home] : Feels safe at home [Fully functional (bathing, dressing, toileting, transferring, walking, feeding)] : Fully functional (bathing, dressing, toileting, transferring, walking, feeding) [Fully functional (using the telephone, shopping, preparing meals, housekeeping, doing laundry, using] : Fully functional and needs no help or supervision to perform IADLs (using the telephone, shopping, preparing meals, housekeeping, doing laundry, using transportation, managing medications and managing finances) [] : No [Change in mental status noted] : No change in mental status noted [Language] : denies difficulty with language [Reports changes in hearing] : Reports no changes in hearing [Reports changes in vision] : Reports no changes in vision [Reports changes in dental health] : Reports no changes in dental health [MammogramDate] : 09/19

## 2020-07-20 NOTE — PHYSICAL EXAM
[No Acute Distress] : no acute distress [Well Nourished] : well nourished [Well Developed] : well developed [Well-Appearing] : well-appearing [Normal Sclera/Conjunctiva] : normal sclera/conjunctiva [PERRL] : pupils equal round and reactive to light [EOMI] : extraocular movements intact [Normal Outer Ear/Nose] : the outer ears and nose were normal in appearance [Normal Oropharynx] : the oropharynx was normal [No JVD] : no jugular venous distention [No Lymphadenopathy] : no lymphadenopathy [Supple] : supple [Thyroid Normal, No Nodules] : the thyroid was normal and there were no nodules present [No Respiratory Distress] : no respiratory distress  [No Accessory Muscle Use] : no accessory muscle use [Clear to Auscultation] : lungs were clear to auscultation bilaterally [Normal Rate] : normal rate  [Regular Rhythm] : with a regular rhythm [Normal S1, S2] : normal S1 and S2 [No Murmur] : no murmur heard [Normal] : normal rate, regular rhythm, normal S1 and S2 and no murmur heard [No Carotid Bruits] : no carotid bruits [No Abdominal Bruit] : a ~M bruit was not heard ~T in the abdomen [No Varicosities] : no varicosities [Pedal Pulses Present] : the pedal pulses are present [No Palpable Aorta] : no palpable aorta [No Edema] : there was no peripheral edema [No Extremity Clubbing/Cyanosis] : no extremity clubbing/cyanosis [Normal Appearance] : normal in appearance [Non Tender] : non-tender [Soft] : abdomen soft [Non-distended] : non-distended [No Masses] : no abdominal mass palpated [No HSM] : no HSM [Normal Bowel Sounds] : normal bowel sounds [Normal Posterior Cervical Nodes] : no posterior cervical lymphadenopathy [Normal Anterior Cervical Nodes] : no anterior cervical lymphadenopathy [No CVA Tenderness] : no CVA  tenderness [No Joint Swelling] : no joint swelling [No Spinal Tenderness] : no spinal tenderness [Grossly Normal Strength/Tone] : grossly normal strength/tone [No Rash] : no rash [Coordination Grossly Intact] : coordination grossly intact [No Focal Deficits] : no focal deficits [Normal Gait] : normal gait [Deep Tendon Reflexes (DTR)] : deep tendon reflexes were 2+ and symmetric [Normal Affect] : the affect was normal [Normal Insight/Judgement] : insight and judgment were intact

## 2020-07-20 NOTE — HISTORY OF PRESENT ILLNESS
[de-identified] : Was hospitalized in Feb for duodenitis. Also had vaginal bleeding and followed by urogyn. Has a pessary.\par She was on prolia for osteoporosis and had an atypical femur fracture.\par Has back and hip pain worse in the mornings, going up stairs, sitting in a soft chair. \par Her right shoulder hurts in the mornings.

## 2020-07-21 LAB
25(OH)D3 SERPL-MCNC: 56.3 NG/ML
ALBUMIN SERPL ELPH-MCNC: 4.4 G/DL
ALP BLD-CCNC: 58 U/L
ALT SERPL-CCNC: 19 U/L
ANION GAP SERPL CALC-SCNC: 13 MMOL/L
APPEARANCE: CLEAR
AST SERPL-CCNC: 25 U/L
BACTERIA: NEGATIVE
BASOPHILS # BLD AUTO: 0.06 K/UL
BASOPHILS NFR BLD AUTO: 1.2 %
BILIRUB SERPL-MCNC: 0.4 MG/DL
BILIRUBIN URINE: NEGATIVE
BLOOD URINE: NEGATIVE
BUN SERPL-MCNC: 10 MG/DL
CALCIUM SERPL-MCNC: 9.7 MG/DL
CHLORIDE SERPL-SCNC: 97 MMOL/L
CHOLEST SERPL-MCNC: 249 MG/DL
CHOLEST/HDLC SERPL: 3.4 RATIO
CO2 SERPL-SCNC: 26 MMOL/L
COLOR: NORMAL
CREAT SERPL-MCNC: 0.72 MG/DL
EOSINOPHIL # BLD AUTO: 0.14 K/UL
EOSINOPHIL NFR BLD AUTO: 2.9 %
ESTIMATED AVERAGE GLUCOSE: 111 MG/DL
FOLATE SERPL-MCNC: >20 NG/ML
GLUCOSE QUALITATIVE U: NEGATIVE
GLUCOSE SERPL-MCNC: 86 MG/DL
HBA1C MFR BLD HPLC: 5.5 %
HCT VFR BLD CALC: 43.8 %
HDLC SERPL-MCNC: 74 MG/DL
HGB BLD-MCNC: 14.5 G/DL
HYALINE CASTS: 1 /LPF
IMM GRANULOCYTES NFR BLD AUTO: 0.2 %
KETONES URINE: NEGATIVE
LDLC SERPL CALC-MCNC: 158 MG/DL
LEUKOCYTE ESTERASE URINE: ABNORMAL
LYMPHOCYTES # BLD AUTO: 1.24 K/UL
LYMPHOCYTES NFR BLD AUTO: 25.8 %
MAN DIFF?: NORMAL
MCHC RBC-ENTMCNC: 31.7 PG
MCHC RBC-ENTMCNC: 33.1 GM/DL
MCV RBC AUTO: 95.6 FL
MICROSCOPIC-UA: NORMAL
MONOCYTES # BLD AUTO: 0.64 K/UL
MONOCYTES NFR BLD AUTO: 13.3 %
NEUTROPHILS # BLD AUTO: 2.72 K/UL
NEUTROPHILS NFR BLD AUTO: 56.6 %
NITRITE URINE: NEGATIVE
PH URINE: 7
PLATELET # BLD AUTO: 372 K/UL
POTASSIUM SERPL-SCNC: 4.3 MMOL/L
PROT SERPL-MCNC: 6.5 G/DL
PROTEIN URINE: NEGATIVE
RBC # BLD: 4.58 M/UL
RBC # FLD: 13.3 %
RED BLOOD CELLS URINE: 1 /HPF
SODIUM SERPL-SCNC: 136 MMOL/L
SPECIFIC GRAVITY URINE: 1.01
SQUAMOUS EPITHELIAL CELLS: 4 /HPF
TRIGL SERPL-MCNC: 82 MG/DL
TSH SERPL-ACNC: 3.42 UIU/ML
URATE SERPL-MCNC: 3.6 MG/DL
UROBILINOGEN URINE: NORMAL
VIT B12 SERPL-MCNC: 1070 PG/ML
WBC # FLD AUTO: 4.81 K/UL
WHITE BLOOD CELLS URINE: 3 /HPF

## 2020-07-30 ENCOUNTER — OUTPATIENT (OUTPATIENT)
Dept: OUTPATIENT SERVICES | Facility: HOSPITAL | Age: 85
LOS: 1 days | End: 2020-07-30
Payer: MEDICARE

## 2020-07-30 ENCOUNTER — APPOINTMENT (OUTPATIENT)
Dept: RADIOLOGY | Facility: CLINIC | Age: 85
End: 2020-07-30
Payer: MEDICARE

## 2020-07-30 DIAGNOSIS — Z98.890 OTHER SPECIFIED POSTPROCEDURAL STATES: Chronic | ICD-10-CM

## 2020-07-30 DIAGNOSIS — Z00.8 ENCOUNTER FOR OTHER GENERAL EXAMINATION: ICD-10-CM

## 2020-07-30 DIAGNOSIS — Z41.9 ENCOUNTER FOR PROCEDURE FOR PURPOSES OTHER THAN REMEDYING HEALTH STATE, UNSPECIFIED: Chronic | ICD-10-CM

## 2020-07-30 PROCEDURE — 77080 DXA BONE DENSITY AXIAL: CPT

## 2020-07-30 PROCEDURE — 77080 DXA BONE DENSITY AXIAL: CPT | Mod: 26

## 2020-09-14 ENCOUNTER — APPOINTMENT (OUTPATIENT)
Dept: UROGYNECOLOGY | Facility: CLINIC | Age: 85
End: 2020-09-14
Payer: MEDICARE

## 2020-09-14 PROCEDURE — 99214 OFFICE O/P EST MOD 30 MIN: CPT

## 2020-09-14 NOTE — HISTORY OF PRESENT ILLNESS
[Vaginal Wall Prolapse] : no [Rectal Prolapse] : no [Constipation Obstructed Defecation] : no [FreeTextEntry1] : Love presents for follow up of  HANY and prolapse. PT using Incontinence dish #5.  She leaves it in for 4 days and removes it for 3 days.  She is able to do self care and comfortable with it.  She applies Replens.\par Denies any issues with urination or moving BM.  PT happy with support.\par \par

## 2020-09-14 NOTE — PHYSICAL EXAM
[No Acute Distress] : in no acute distress [Well developed] : well developed [Well Nourished] : ~L well nourished [Oriented x3] : oriented to person, place, and time [Good Hygeine] : demonstrates good hygeine [Normal Memory] : ~T memory was ~L unimpaired [Normal Mood/Affect] : mood and affect are normal [Warm and Dry] : was warm and dry to touch [Normal Gait] : gait was normal [Vulvar Atrophy] : vulvar atrophy [Labia Majora] : were normal [Labia Majora Erythema] : erythema [Labia Minora] : were normal [Mucosal Prolapse] : had a mucosal prolapse [Atrophy] : atrophy [Scant] : scant [Discharge] : a  ~M vaginal discharge was present [Clear] : clear [No Bleeding] : there was no active vaginal bleeding [Ba ____] : Ba [unfilled] [Aa ____] : Aa [unfilled] [C ____] : C [unfilled] [GH ____] : GH [unfilled] [PB ____] : PB [unfilled] [TVL ____] : TVL  [unfilled] [Normal] : normal [Anxiety] : patient is not anxious [Tenderness] : ~T no ~M abdominal tenderness observed [Distended] : not distended [Inguinal LAD] : no adenopathy was noted in the inguinal lymph nodes [de-identified] : cervix scarred up

## 2020-09-14 NOTE — END OF VISIT
[FreeTextEntry3] : I have fully participated in the care of this patient. I have reviewed all pertinent clinical information, including history, physical exam, plan and the note and I agree.

## 2020-09-14 NOTE — DISCUSSION/SUMMARY
[FreeTextEntry1] : Love presents for follow up with a h/o pelvic organ prolapse, HANY and atrophy. \par -Continue Yuvafem biw \par -Continue pessary care and self care.\par \par -RTO in 12 weeks for follow up, or sooner if issues arise.  All questions answered. \par

## 2020-09-14 NOTE — PROCEDURE
[Good Fit] : fits well [Pessary Inserted] : inserted [Pessary Washed] : washed [None] : no bleeding [Medication Review] : Medicaiton Review: Patient verbalizes understanding of risks and benefits [Refit] : refit is not needed [Erosion] : no evidence of erosion [Erythema] : no erythema [Discharge] : no vaginal discharge [Infection] : no evidence of infection [FreeTextEntry1] : incontinence dish #5 [FreeTextEntry3] : Replens [FreeTextEntry8] : Reinforced daily pericare.  Continue pessary self-care.

## 2021-01-04 ENCOUNTER — APPOINTMENT (OUTPATIENT)
Dept: UROGYNECOLOGY | Facility: CLINIC | Age: 86
End: 2021-01-04
Payer: MEDICARE

## 2021-01-04 VITALS
WEIGHT: 122 LBS | HEART RATE: 72 BPM | TEMPERATURE: 97.6 F | HEIGHT: 59 IN | SYSTOLIC BLOOD PRESSURE: 134 MMHG | DIASTOLIC BLOOD PRESSURE: 66 MMHG | BODY MASS INDEX: 24.6 KG/M2 | OXYGEN SATURATION: 98 %

## 2021-01-04 PROCEDURE — 99214 OFFICE O/P EST MOD 30 MIN: CPT

## 2021-01-04 NOTE — DISCUSSION/SUMMARY
[FreeTextEntry1] : Love presents for follow up with a h/o pelvic organ prolapse, HANY and atrophy. \par -She may continue with Replens as alternatives.  She may apply some A&D ointment or Aquaphor to vulva, labia and pad daily.\par -Continue pessary care and self care.\par \par -RTO in 3 months or sooner for follow up on pelvic prolapse.  IF pt have any problems/concern to call office.  PT aware and agrees.\par

## 2021-01-04 NOTE — END OF VISIT
[FreeTextEntry3] : I have fully participated in the care of this patient. I have reviewed all pertinent clinical information, including history, physical exam, plan and the note and I agree.  [Time Spent: ___ minutes] : I have spent [unfilled] minutes of time on the encounter.

## 2021-01-04 NOTE — HISTORY OF PRESENT ILLNESS
[Vaginal Wall Prolapse] : no [Rectal Prolapse] : no [Constipation Obstructed Defecation] : no [Urinary Frequency] : no [Urinary Tract Infection] : no [FreeTextEntry1] : Love presents for follow up of  HANY and prolapse. PT using Incontinence dish #5.  \par She leaves it in for 4 days and removes it for 3 days.  \par She is able to do self care and comfortable with it.  She applies Replens with care.\par Denies any issues with urination or moving BM.  PT happy with support.\par \par

## 2021-01-04 NOTE — PHYSICAL EXAM
[No Acute Distress] : in no acute distress [Well developed] : well developed [Well Nourished] : ~L well nourished [Good Hygeine] : demonstrates good hygeine [Oriented x3] : oriented to person, place, and time [Normal Memory] : ~T memory was ~L unimpaired [Normal Mood/Affect] : mood and affect are normal [Warm and Dry] : was warm and dry to touch [Normal Gait] : gait was normal [Vulvar Atrophy] : vulvar atrophy [Labia Majora] : were normal [Labia Majora Erythema] : erythema [Labia Minora] : were normal [Mucosal Prolapse] : had a mucosal prolapse [Atrophy] : atrophy [Discharge] : a  ~M vaginal discharge was present [Scant] : scant [Clear] : clear [No Bleeding] : there was no active vaginal bleeding [Aa ____] : Aa [unfilled] [Ba ____] : Ba [unfilled] [C ____] : C [unfilled] [GH ____] : GH [unfilled] [PB ____] : PB [unfilled] [TVL ____] : TVL  [unfilled] [Normal] : no abnormalities [Anxiety] : patient is not anxious [Tenderness] : ~T no ~M abdominal tenderness observed [Distended] : not distended [Inguinal LAD] : no adenopathy was noted in the inguinal lymph nodes

## 2021-04-05 ENCOUNTER — APPOINTMENT (OUTPATIENT)
Dept: UROGYNECOLOGY | Facility: CLINIC | Age: 86
End: 2021-04-05
Payer: MEDICARE

## 2021-04-05 VITALS
HEIGHT: 59 IN | SYSTOLIC BLOOD PRESSURE: 128 MMHG | TEMPERATURE: 97.2 F | WEIGHT: 123 LBS | BODY MASS INDEX: 24.8 KG/M2 | DIASTOLIC BLOOD PRESSURE: 72 MMHG

## 2021-04-05 PROCEDURE — 99213 OFFICE O/P EST LOW 20 MIN: CPT | Mod: GC

## 2021-04-05 NOTE — PHYSICAL EXAM

## 2021-04-05 NOTE — HISTORY OF PRESENT ILLNESS
[Vaginal Wall Prolapse] : no [Rectal Prolapse] : no [Urinary Frequency] : no [Urinary Tract Infection] : no [Constipation Obstructed Defecation] : no [FreeTextEntry1] : \par Love presents for follow up of  HANY and prolapse. PT using Incontinence dish #5. She leaves it in for 4 days and removes it for 3 days. She is able to do self care and comfortable with it. She applies Replens with care and is also using yuvafem twice a week. Denies any bleeding, prolapse past the pessary, issues with urination or moving BM. PT happy with support.\par \par

## 2021-04-05 NOTE — DISCUSSION/SUMMARY
[FreeTextEntry1] : Love presents for follow up with a h/o pelvic organ prolapse, HANY, and atrophy. \par -Continue yuvafem twice a week\par -She may continue with Replens on alternating days. She may apply some A&D ointment or Aquaphor to vulva, labia and pad daily.\par -Continue pessary care and self care.\par \par -RTO in 3 months or sooner for follow up on pelvic prolapse.  IF pt have any problems/concern to call office.  PT aware and agrees.\par

## 2021-05-29 NOTE — ED ADULT NURSE NOTE - OBJECTIVE STATEMENT
1. Can take Losartan 1/2 tab ( 50 mg) twice a day. Measure b/p at home and let me know if b/p 105 or lower. If still feeling dizzy, O'K to decrease to 1/2 tab once a day.     2. Try to sleep more.  Consider seeing psychologist.  Increase Lexapro to 10 mg a day.     Try 1/2 or 1/4 tab of Trazodone to help with sleep  
Pt presents to the ED s/p RLQ pain, mild nausea (Pt states" I have post nasal drip.") Pt denies fever, chills.

## 2021-07-12 ENCOUNTER — APPOINTMENT (OUTPATIENT)
Dept: UROGYNECOLOGY | Facility: CLINIC | Age: 86
End: 2021-07-12
Payer: MEDICARE

## 2021-07-12 PROCEDURE — 99213 OFFICE O/P EST LOW 20 MIN: CPT

## 2021-07-12 NOTE — PHYSICAL EXAM
[Chaperone Present] : A chaperone was present in the examining room during all aspects of the physical examination [No Acute Distress] : in no acute distress [Well developed] : well developed [Well Nourished] : ~L well nourished [Good Hygeine] : demonstrates good hygeine [Oriented x3] : oriented to person, place, and time [Normal Memory] : ~T memory was ~L unimpaired [Normal Mood/Affect] : mood and affect are normal [Soft, Nontender] : the abdomen was soft and nontender [Warm and Dry] : was warm and dry to touch [Normal Gait] : gait was normal [Vulvar Atrophy] : vulvar atrophy [Labia Majora] : were normal [Labia Minora] : were normal [Atrophy] : atrophy [No Bleeding] : there was no active vaginal bleeding [Aa ____] : Aa [unfilled] [Ba ____] : Ba [unfilled] [C ____] : C [unfilled] [GH ____] : GH [unfilled] [PB ____] : PB [unfilled] [TVL ____] : TVL  [unfilled] [Normal] : no abnormalities [Anxiety] : patient is not anxious [Tenderness] : ~T no ~M abdominal tenderness observed [Distended] : not distended [Inguinal LAD] : no adenopathy was noted in the inguinal lymph nodes

## 2021-07-12 NOTE — HISTORY OF PRESENT ILLNESS
[Vaginal Wall Prolapse] : no [Rectal Prolapse] : no [Urinary Frequency] : no [Urinary Tract Infection] : no [Constipation Obstructed Defecation] : no [FreeTextEntry1] : Love, 86 y/o female presents for follow up of  HANY and prolapse. PT using Incontinence dish #5. She leaves it in for 4 days and removes it for 3 days. She is able to do self care and comfortable with it. She applies Replens with care and is also using yuvafem twice a week. Denies any bleeding, prolapse past the pessary, issues with urination or moving BM. PT happy with support.\par \par

## 2021-07-12 NOTE — DISCUSSION/SUMMARY
[FreeTextEntry1] : Pelvic organ prolapse, HANY, and atrophy:\par -Continue use of the Incontinence dish # 5.\par -Precaution and instructions were discussed.\par \par HANY:\par -Supported with pessary and managing well.\par \par Atrophy:\par -Continue yuvafem twice a week\par -She may continue with Replens on alternating days. She may apply some A&D ointment or Aquaphor to vulva, labia and pad daily.\par \par -RTO in 6 months or sooner for follow up on pelvic prolapse.  IF pt have any problems/concern to call office.  PT aware and agrees.\par

## 2021-07-21 ENCOUNTER — APPOINTMENT (OUTPATIENT)
Dept: INTERNAL MEDICINE | Facility: CLINIC | Age: 86
End: 2021-07-21
Payer: MEDICARE

## 2021-07-21 ENCOUNTER — NON-APPOINTMENT (OUTPATIENT)
Age: 86
End: 2021-07-21

## 2021-07-21 VITALS
WEIGHT: 116 LBS | RESPIRATION RATE: 14 BRPM | TEMPERATURE: 97.8 F | BODY MASS INDEX: 23.39 KG/M2 | HEIGHT: 59 IN | HEART RATE: 76 BPM | OXYGEN SATURATION: 98 % | DIASTOLIC BLOOD PRESSURE: 74 MMHG | SYSTOLIC BLOOD PRESSURE: 138 MMHG

## 2021-07-21 DIAGNOSIS — R92.2 INCONCLUSIVE MAMMOGRAM: ICD-10-CM

## 2021-07-21 PROCEDURE — G0439: CPT

## 2021-07-21 PROCEDURE — 93000 ELECTROCARDIOGRAM COMPLETE: CPT

## 2021-07-21 NOTE — HISTORY OF PRESENT ILLNESS
[de-identified] : Heat was bothering her when it was very hot and humid. She is doing better now. Felt like she couldn't breathe normally and heart was beating fast.  She had trouble with the AC and now better since fixed. Does a virtual exercise class 3 times a week. \par Has a pessary. Pt removes it once a week.

## 2021-07-22 LAB
25(OH)D3 SERPL-MCNC: 62.4 NG/ML
ALBUMIN SERPL ELPH-MCNC: 4.3 G/DL
ALP BLD-CCNC: 59 U/L
ALT SERPL-CCNC: 15 U/L
ANION GAP SERPL CALC-SCNC: 13 MMOL/L
APPEARANCE: CLEAR
AST SERPL-CCNC: 28 U/L
BACTERIA: NEGATIVE
BASOPHILS # BLD AUTO: 0.06 K/UL
BASOPHILS NFR BLD AUTO: 1.2 %
BILIRUB SERPL-MCNC: 0.4 MG/DL
BILIRUBIN URINE: NEGATIVE
BLOOD URINE: NEGATIVE
BUN SERPL-MCNC: 8 MG/DL
CALCIUM SERPL-MCNC: 9.8 MG/DL
CHLORIDE SERPL-SCNC: 101 MMOL/L
CHOLEST SERPL-MCNC: 240 MG/DL
CO2 SERPL-SCNC: 24 MMOL/L
COLOR: NORMAL
CREAT SERPL-MCNC: 0.62 MG/DL
EOSINOPHIL # BLD AUTO: 0.14 K/UL
EOSINOPHIL NFR BLD AUTO: 2.8 %
ESTIMATED AVERAGE GLUCOSE: 105 MG/DL
FOLATE SERPL-MCNC: >20 NG/ML
GLUCOSE QUALITATIVE U: NEGATIVE
GLUCOSE SERPL-MCNC: 83 MG/DL
HBA1C MFR BLD HPLC: 5.3 %
HCT VFR BLD CALC: 44.6 %
HDLC SERPL-MCNC: 71 MG/DL
HGB BLD-MCNC: 14.2 G/DL
HYALINE CASTS: 2 /LPF
IMM GRANULOCYTES NFR BLD AUTO: 0.2 %
KETONES URINE: NEGATIVE
LDLC SERPL CALC-MCNC: 156 MG/DL
LEUKOCYTE ESTERASE URINE: NEGATIVE
LYMPHOCYTES # BLD AUTO: 1.01 K/UL
LYMPHOCYTES NFR BLD AUTO: 20.4 %
MAN DIFF?: NORMAL
MCHC RBC-ENTMCNC: 31.1 PG
MCHC RBC-ENTMCNC: 31.8 GM/DL
MCV RBC AUTO: 97.8 FL
MICROSCOPIC-UA: NORMAL
MONOCYTES # BLD AUTO: 0.56 K/UL
MONOCYTES NFR BLD AUTO: 11.3 %
NEUTROPHILS # BLD AUTO: 3.17 K/UL
NEUTROPHILS NFR BLD AUTO: 64.1 %
NITRITE URINE: NEGATIVE
NONHDLC SERPL-MCNC: 169 MG/DL
PH URINE: 6.5
PLATELET # BLD AUTO: 334 K/UL
POTASSIUM SERPL-SCNC: 4.2 MMOL/L
PROT SERPL-MCNC: 6.5 G/DL
PROTEIN URINE: NEGATIVE
RBC # BLD: 4.56 M/UL
RBC # FLD: 13.4 %
RED BLOOD CELLS URINE: 0 /HPF
SODIUM SERPL-SCNC: 138 MMOL/L
SPECIFIC GRAVITY URINE: 1
SQUAMOUS EPITHELIAL CELLS: 5 /HPF
TRIGL SERPL-MCNC: 68 MG/DL
TSH SERPL-ACNC: 2.74 UIU/ML
URATE SERPL-MCNC: 3.5 MG/DL
UROBILINOGEN URINE: NORMAL
VIT B12 SERPL-MCNC: 1083 PG/ML
WBC # FLD AUTO: 4.95 K/UL
WHITE BLOOD CELLS URINE: 2 /HPF

## 2021-09-01 ENCOUNTER — NON-APPOINTMENT (OUTPATIENT)
Age: 86
End: 2021-09-01

## 2021-09-01 ENCOUNTER — APPOINTMENT (OUTPATIENT)
Dept: CARDIOLOGY | Facility: CLINIC | Age: 86
End: 2021-09-01
Payer: MEDICARE

## 2021-09-01 VITALS
OXYGEN SATURATION: 97 % | WEIGHT: 115 LBS | DIASTOLIC BLOOD PRESSURE: 74 MMHG | SYSTOLIC BLOOD PRESSURE: 127 MMHG | BODY MASS INDEX: 23.18 KG/M2 | HEIGHT: 59 IN | HEART RATE: 70 BPM

## 2021-09-01 PROCEDURE — 93000 ELECTROCARDIOGRAM COMPLETE: CPT

## 2021-09-01 PROCEDURE — 99204 OFFICE O/P NEW MOD 45 MIN: CPT

## 2021-09-13 ENCOUNTER — APPOINTMENT (OUTPATIENT)
Dept: CARDIOLOGY | Facility: CLINIC | Age: 86
End: 2021-09-13
Payer: MEDICARE

## 2021-09-13 PROCEDURE — 93306 TTE W/DOPPLER COMPLETE: CPT

## 2021-09-13 PROCEDURE — 93880 EXTRACRANIAL BILAT STUDY: CPT

## 2021-09-28 ENCOUNTER — APPOINTMENT (OUTPATIENT)
Dept: CARDIOLOGY | Facility: CLINIC | Age: 86
End: 2021-09-28
Payer: MEDICARE

## 2021-09-28 PROCEDURE — 93015 CV STRESS TEST SUPVJ I&R: CPT

## 2021-10-18 ENCOUNTER — NON-APPOINTMENT (OUTPATIENT)
Age: 86
End: 2021-10-18

## 2021-10-19 ENCOUNTER — APPOINTMENT (OUTPATIENT)
Dept: CARDIOLOGY | Facility: CLINIC | Age: 86
End: 2021-10-19
Payer: MEDICARE

## 2021-10-19 PROCEDURE — A9500: CPT

## 2021-10-19 PROCEDURE — 93015 CV STRESS TEST SUPVJ I&R: CPT

## 2021-10-19 PROCEDURE — 78452 HT MUSCLE IMAGE SPECT MULT: CPT

## 2021-11-10 ENCOUNTER — RX RENEWAL (OUTPATIENT)
Age: 86
End: 2021-11-10

## 2022-01-03 ENCOUNTER — APPOINTMENT (OUTPATIENT)
Dept: UROGYNECOLOGY | Facility: CLINIC | Age: 87
End: 2022-01-03
Payer: COMMERCIAL

## 2022-01-03 VITALS — TEMPERATURE: 97.3 F

## 2022-01-03 PROCEDURE — 99213 OFFICE O/P EST LOW 20 MIN: CPT

## 2022-01-03 NOTE — PHYSICAL EXAM
[No Acute Distress] : in no acute distress [Well developed] : well developed [Well Nourished] : ~L well nourished [Good Hygeine] : demonstrates good hygeine [Oriented x3] : oriented to person, place, and time [Normal Memory] : ~T memory was ~L unimpaired [Normal Mood/Affect] : mood and affect are normal [Soft, Nontender] : the abdomen was soft and nontender [Warm and Dry] : was warm and dry to touch [Normal Gait] : gait was normal [Vulvar Atrophy] : vulvar atrophy [Labia Majora] : were normal [Labia Minora] : were normal [Atrophy] : atrophy [No Bleeding] : there was no active vaginal bleeding [Aa ____] : Aa [unfilled] [Ba ____] : Ba [unfilled] [C ____] : C [unfilled] [GH ____] : GH [unfilled] [PB ____] : PB [unfilled] [TVL ____] : TVL  [unfilled] [Normal] : no abnormalities [Anxiety] : patient is not anxious [Tenderness] : ~T no ~M abdominal tenderness observed [Distended] : not distended [Inguinal LAD] : no adenopathy was noted in the inguinal lymph nodes

## 2022-01-03 NOTE — HISTORY OF PRESENT ILLNESS
[Vaginal Wall Prolapse] : no [Rectal Prolapse] : no [Urinary Frequency] : no [Urinary Tract Infection] : no [Constipation Obstructed Defecation] : no [FreeTextEntry1] : Love, 86 y/o female presents for follow up of  HANY and prolapse. PT using Incontinence dish #5. She leaves it in for 4 days and removes it for 3 days. She is able to do self care and comfortable with it. She applies Replens with care and is also using yuvafem twice a week. Denies any bleeding, prolapse past the pessary, issues with urination or moving BM. PT happy with support.  No changes since last visit.\par \par

## 2022-03-01 ENCOUNTER — APPOINTMENT (OUTPATIENT)
Dept: CARDIOLOGY | Facility: CLINIC | Age: 87
End: 2022-03-01

## 2022-03-25 ENCOUNTER — APPOINTMENT (OUTPATIENT)
Dept: INTERNAL MEDICINE | Facility: CLINIC | Age: 87
End: 2022-03-25

## 2022-03-28 ENCOUNTER — APPOINTMENT (OUTPATIENT)
Dept: OTOLARYNGOLOGY | Facility: CLINIC | Age: 87
End: 2022-03-28

## 2022-03-31 ENCOUNTER — APPOINTMENT (OUTPATIENT)
Dept: OTOLARYNGOLOGY | Facility: CLINIC | Age: 87
End: 2022-03-31
Payer: MEDICARE

## 2022-03-31 VITALS
HEIGHT: 59 IN | HEART RATE: 78 BPM | WEIGHT: 120 LBS | BODY MASS INDEX: 24.19 KG/M2 | DIASTOLIC BLOOD PRESSURE: 75 MMHG | SYSTOLIC BLOOD PRESSURE: 165 MMHG | TEMPERATURE: 97.1 F

## 2022-03-31 PROCEDURE — 99204 OFFICE O/P NEW MOD 45 MIN: CPT | Mod: 25

## 2022-03-31 PROCEDURE — 31231 NASAL ENDOSCOPY DX: CPT

## 2022-03-31 NOTE — ASSESSMENT
[FreeTextEntry1] : 87 y/o F who presents with acute sinusitis after tooth extraction, on exam purulence Left side. Culture taken today. \par - will call with results, and will send in abx\par - We will proceed with a regiment of decongestants, antibiotics and irrigation to try to break the cycle of inflation and obstruction. this will treat the acute symptoms and will hopefully allow for maintenance therapy to reach disease areas and avoid further intervention.

## 2022-03-31 NOTE — HISTORY OF PRESENT ILLNESS
[de-identified] : 87 y/o F with hx of 14 top tooth extractions 03/16/2022, now with acute sinus pressure L- frontal, maxillary was put on Augmentin due to facial pain that she developed few days later. Pt states "felt sinus drainage". \par + mucous drainage- not using any nasal sprays or saline\par Pt also with no nasal congestion

## 2022-03-31 NOTE — CONSULT LETTER
[Please see my note below.] : Please see my note below. [FreeTextEntry1] : Dear Dr. DARYN LÓPEZ \par I had the pleasure of evaluating your patient ALPA BHATIA, thank you for allowing us to participate in their care. please see full note detailing our visit below.\par If you have any questions, please do not hesitate to call me and I would be happy to discuss further. \par \par Josue Anderson M.D.\par Attending Physician,  \par Department of Otolaryngology - Head and Neck Surgery\par [facility] \par Office: [phone]\par Fax: [phone]\par \par

## 2022-03-31 NOTE — PROCEDURE
[FreeTextEntry6] : Procedure performed: Nasal Endoscopy- Diagnostic\par Pre-op indication(s): nasal congestion\par Post-op indication(s): nasal congestion \par Verbal and/or written consent obtained from patient\par Anterior rhinoscopy insufficient to account for symptoms\par Scope #: 3,  flexible fiber optic telescope \par The scope was introduced in the nasal passage between the middle and inferior turbinates to exam the inferior portion of the middle meatus and the fontanelle, as well as the maxillary ostia.  Next, the scope was passed medically and posteriorly to the middle turbinates to examine the sphenoethmoid recess and the superior turbinate region.\par Upon visualization the finders are as follows:\par Nasal Septum: sigmoidal septal deviation\par Bilateral - Mucosa: boggy turbinates, Mucous: +purulence L, Polyp: not seen, Inferior Turbinate: boggy, Middle Turbinate: BITH, Superior Turbinate: normal, Inferior Meatus: narrow, Middle Meatus: narrow, Super Meatus:normal, Sphenoethmoidal Recess: clear\par

## 2022-03-31 NOTE — END OF VISIT
[FreeTextEntry3] : I personally saw and examined ALPA BHATIA in detail. I spoke to SHIELA Mills regarding the assessment and plan of care.  I preformed the procedures and I reviewed the above assessment and plan of care, and agree. I have made changes in changes in the body of the note where appropriate.\par \par

## 2022-04-04 ENCOUNTER — NON-APPOINTMENT (OUTPATIENT)
Age: 87
End: 2022-04-04

## 2022-04-04 ENCOUNTER — APPOINTMENT (OUTPATIENT)
Dept: UROGYNECOLOGY | Facility: CLINIC | Age: 87
End: 2022-04-04
Payer: MEDICARE

## 2022-04-04 PROCEDURE — 99213 OFFICE O/P EST LOW 20 MIN: CPT

## 2022-04-04 NOTE — PROCEDURE
[Good Fit] : fits well [Pessary Inserted] : inserted [Pessary Washed] : washed [None] : no bleeding [Medication Review] : Medicaiton Review: Patient verbalizes understanding of risks and benefits [Refit] : refit is not needed [Erosion] : no evidence of erosion [Erythema] : no erythema [Discharge] : no vaginal discharge [Infection] : no evidence of infection [FreeTextEntry1] : Incontinence dish #5 [FreeTextEntry8] : Reinforced daily pericare.  Continue pessary self-care. [FreeTextEntry3] : Replens

## 2022-04-07 ENCOUNTER — NON-APPOINTMENT (OUTPATIENT)
Age: 87
End: 2022-04-07

## 2022-04-12 ENCOUNTER — APPOINTMENT (OUTPATIENT)
Dept: OTOLARYNGOLOGY | Facility: CLINIC | Age: 87
End: 2022-04-12

## 2022-04-18 ENCOUNTER — APPOINTMENT (OUTPATIENT)
Dept: CARDIOLOGY | Facility: CLINIC | Age: 87
End: 2022-04-18
Payer: MEDICARE

## 2022-04-18 ENCOUNTER — NON-APPOINTMENT (OUTPATIENT)
Age: 87
End: 2022-04-18

## 2022-04-18 VITALS
OXYGEN SATURATION: 98 % | HEART RATE: 88 BPM | SYSTOLIC BLOOD PRESSURE: 147 MMHG | HEIGHT: 59 IN | DIASTOLIC BLOOD PRESSURE: 72 MMHG

## 2022-04-18 DIAGNOSIS — R94.39 ABNORMAL RESULT OF OTHER CARDIOVASCULAR FUNCTION STUDY: ICD-10-CM

## 2022-04-18 PROCEDURE — 93000 ELECTROCARDIOGRAM COMPLETE: CPT

## 2022-04-18 PROCEDURE — 99215 OFFICE O/P EST HI 40 MIN: CPT

## 2022-04-18 RX ORDER — OXYMETAZOLINE HCL 0.05 %
0.05 SPRAY, NON-AEROSOL (ML) NASAL
Qty: 1 | Refills: 0 | Status: DISCONTINUED | COMMUNITY
Start: 2022-03-31 | End: 2022-04-18

## 2022-04-18 RX ORDER — PREDNISONE 10 MG/1
10 TABLET ORAL
Qty: 27 | Refills: 0 | Status: DISCONTINUED | COMMUNITY
Start: 2022-03-31 | End: 2022-04-18

## 2022-04-28 LAB
ALBUMIN SERPL ELPH-MCNC: 3.9 G/DL
ALP BLD-CCNC: 68 U/L
ALT SERPL-CCNC: 23 U/L
ANION GAP SERPL CALC-SCNC: 13 MMOL/L
AST SERPL-CCNC: 33 U/L
BILIRUB SERPL-MCNC: 0.5 MG/DL
BUN SERPL-MCNC: 12 MG/DL
CALCIUM SERPL-MCNC: 9.8 MG/DL
CHLORIDE SERPL-SCNC: 103 MMOL/L
CHOLEST SERPL-MCNC: 224 MG/DL
CO2 SERPL-SCNC: 24 MMOL/L
CREAT SERPL-MCNC: 0.6 MG/DL
EGFR: 88 ML/MIN/1.73M2
GLUCOSE SERPL-MCNC: 74 MG/DL
HDLC SERPL-MCNC: 72 MG/DL
LDLC SERPL CALC-MCNC: 139 MG/DL
NONHDLC SERPL-MCNC: 151 MG/DL
POTASSIUM SERPL-SCNC: 4.2 MMOL/L
PROT SERPL-MCNC: 6.9 G/DL
SODIUM SERPL-SCNC: 141 MMOL/L
TRIGL SERPL-MCNC: 61 MG/DL

## 2022-05-06 ENCOUNTER — APPOINTMENT (OUTPATIENT)
Dept: OTOLARYNGOLOGY | Facility: CLINIC | Age: 87
End: 2022-05-06
Payer: MEDICARE

## 2022-05-06 VITALS — BODY MASS INDEX: 24.19 KG/M2 | WEIGHT: 120 LBS | HEIGHT: 59 IN | TEMPERATURE: 96.7 F

## 2022-05-06 VITALS — HEIGHT: 59 IN | BODY MASS INDEX: 24.19 KG/M2 | TEMPERATURE: 97.1 F | WEIGHT: 120 LBS

## 2022-05-06 PROCEDURE — 92557 COMPREHENSIVE HEARING TEST: CPT

## 2022-05-06 PROCEDURE — 92567 TYMPANOMETRY: CPT

## 2022-05-06 PROCEDURE — 31231 NASAL ENDOSCOPY DX: CPT

## 2022-05-06 PROCEDURE — 99214 OFFICE O/P EST MOD 30 MIN: CPT | Mod: 25

## 2022-05-06 NOTE — DATA REVIEWED
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

## 2022-05-06 NOTE — CONSULT LETTER
[Please see my note below.] : Please see my note below. [FreeTextEntry1] : Dear Dr. DARYN LÓPEZ \par I had the pleasure of evaluating your patient ALPA BHATIA, thank you for allowing us to participate in their care. please see full note detailing our visit below.\par If you have any questions, please do not hesitate to call me and I would be happy to discuss further. \par \par Josue Anderson M.D.\par Attending Physician,  \par Department of Otolaryngology - Head and Neck Surgery\par [facility] \par Office: [phone]\par Fax: [phone]\par \par

## 2022-05-06 NOTE — PROCEDURE
[FreeTextEntry6] : Procedure performed: Nasal Endoscopy- Diagnostic\par Pre-op indication(s): nasal congestion\par Post-op indication(s): nasal congestion \par Verbal and/or written consent obtained from patient\par Anterior rhinoscopy insufficient to account for symptoms\par Scope #: 3,  flexible fiber optic telescope \par The scope was introduced in the nasal passage between the middle and inferior turbinates to exam the inferior portion of the middle meatus and the fontanelle, as well as the maxillary ostia.  Next, the scope was passed medically and posteriorly to the middle turbinates to examine the sphenoethmoid recess and the superior turbinate region.\par Upon visualization the finders are as follows:\par Nasal Septum: sigmoidal septal deviation\par Bilateral - Mucosa: boggy turbinates, Mucous: +thiin murky fluid, Polyp: not seen, Inferior Turbinate: boggy, Middle Turbinate: BITH, Superior Turbinate: normal, Inferior Meatus: narrow, Middle Meatus: narrow, Super Meatus:normal, Sphenoethmoidal Recess: clear\par

## 2022-05-06 NOTE — ASSESSMENT
[FreeTextEntry1] : 85 y/o F who presents with acute sinusitis after tooth extraction, on exam less but still mild purulence Left side. has been persistent and refractory to Tx\par - Risks benefits and alternatives of endoscopic sinus surgery with possible image guidance possible septoplasty bilateral inferior turbinate reduction discussed with patient at length. Risks discussed include but were not limited to bleeding, infection, persistent symptoms, scarring, injury to the skull base and brain and CSF leak, injury to orbit and eye, crusting, septal hematoma, septal perforation results in whistling, empty nose syndrome, crusting and bleeding as well as continued nasal obstruction etc.  were discussed. For polyps, discussed very high recurrence rate that require future surveillance, maintenance and likelihood of need for further procedures. Also discussed option of office balloon/hybrid balloon dilation and office sinus surgery - similar risk profile, will not address septum/ turbs and takes a generally more conservative approach with quicker recovery, however higher possibility for need for future intervention.\par will get CT and confirm and book going ahead with procedure. will see which are involved hopefully just maxillary, will be convcervative if possible  \par \par \par left ear retraction and HL with HA use \par will see if improves with sinus Tx

## 2022-05-06 NOTE — REVIEW OF SYSTEMS
[Post Nasal Drip] : post nasal drip [Nasal Congestion] : nasal congestion [Hoarseness] : hoarseness [As Noted in HPI] : as noted in HPI [Negative] : Mouth and Throat [de-identified] : ear feels blocked [FreeTextEntry1] : when taking afrin on the second day, patient had allergic reaction. Face got puffy and hot to touch

## 2022-05-06 NOTE — HISTORY OF PRESENT ILLNESS
[facility]  [address] [facility] [id number] [phone]  Dept: [phone]    PRE-OP EVALUATION:  Today's date: 2017    Abby Casey (: 1945) presents for pre-operative evaluation assessment as requested by Dr. Negra Olivia. She requires evaluation and anesthesia risk assessment prior to undergoing surgery/procedure for treatment of  Abnormal bleeding and pain. Proposed procedure: DILATION AND CURETTAGE, OPERATIVE HYSTEROSCOPY WITH MORCELLATOR     Date of Surgery/ Procedure: 17  Time of Surgery/ Procedure: 10:10 am  Hospital/Surgical Facility: [facility]  Fax number for surgical facility:   Primary Physician: Gela Bueno  Type of Anesthesia Anticipated: General    Patient has a Health Care Directive or Living Will: YES     Preop Questions 2017   1.  Do you have a history of heart attack, stroke, stent, bypass or surgery on an artery in the head, neck, heart or legs? No   2.  Do you ever have any pain or discomfort in your chest? No   3.  Do you have a history of  Heart Failure? No   4.   Are you troubled by shortness of breath when:  walking on a level surface, or up a slight hill, or at night? No   5.  Do you currently have a cold, bronchitis or other respiratory infection? No   6.  Do you have a cough, shortness of breath, or wheezing? No   7.  Do you sometimes get pains in the calves of your legs when you walk? No   8. Do you or anyone in your family have previous history of blood clots? No   9.  Do you or does anyone in your family have a serious bleeding problem such as prolonged bleeding following surgeries or cuts? No   10. Have you ever had problems with anemia or been told to take iron pills? No   11. Have you had any abnormal blood loss such as black, tarry or bloody stools, or abnormal vaginal bleeding? No   12. Have you ever had a blood transfusion? YES - no complications   13. Have you or any of your relatives ever had  problems with anesthesia? No   14. Do you have sleep apnea, excessive snoring or daytime drowsiness? No   15. Do you have any prosthetic heart valves? No   16. Do you have prosthetic joints? No   17. Is there any chance that you may be pregnant? No     HPI:                                                      Brief HPI related to upcoming procedure: Patient began to experience postmenopausal bleeding and left adnexal pain 3/2017. US showed thickened EMS with irregularity, concerning for endometrial polyp. Patient is to undergo a diagnostic hysteroscopy, dilation and curettage, possible morcellation.        See problem list for active medical problems. Problems all longstanding and stable, except as noted/documented. See ROS for pertinent symptoms related to these conditions.    HYPERLIPIDEMIA - Patient has a long history of significant Hyperlipidemia requiring medication for treatment with recent good control. Patient reports no problems or side effects with the medication.  Lab Results   Component Value Date    LDL 92 04/17/2017    LDL 77 10/05/2016     HYPOTHYROIDISM - Patient has a longstanding history of chronic Hypothyroidism. Patient has been doing well, noting no tremor, insomnia, hair loss or changes in skin texture. Continues to take medications as directed, without adverse reactions or side effects.   TSH   Date Value Ref Range Status   04/26/2017 0.14 (L) 0.40 - 4.00 mU/L Final     MEDICAL HISTORY:                                                      Patient Active Problem List    Diagnosis Date Noted     Obesity 05/19/2016     Priority: Medium     Adjustment disorder with anxious mood 12/17/2015     Priority: Medium     Right rotator cuff tear 08/10/2015     Priority: Medium     Rupture of right proximal biceps tendon 08/10/2015     Priority: Medium     Tendinopathy of right rotator cuff 08/10/2015     Priority: Medium     Osteoarthritis of right acromioclavicular joint 08/10/2015     Priority: Medium      Acute pain of right shoulder 07/22/2015     Priority: Medium     Advanced directives, counseling/discussion 04/04/2012     Discussed advance care planning with patient; information given to patient to review. 4/4/2012   Advance Care Planning:   Receipt of ACP document:  Received: Health Care Directive which was witnessed or notarized on 2/19/13.  Document not previously scanned.  Validation form completed and sent with document to be scanned.  Code Status reflects choices in most recent ACP document.  Confirmed/documented designated decision maker(s). See permanent comments section of demographics in clinical tab. View document(s) and details by clicking on code status.   Added by Dulce Dinh on 3/4/2013.               Papanicolaou smear of cervix with low grade squamous intraepithelial lesion (LGSIL) 04/04/2012 04/04/12 LSIL, low risk type HPV: Postmenopausal status, repeat in 1 year per Dr. Bueno  04/18/13 Pap=NIL. 1 year repeat   04/21/14 Pap= Normal. Repeat co-testing 1 yr.  04/23/15 Pap= Normal, HPV negative. Co-test 3 yrs       Elevated glucose 01/31/2012     elevated GLC, A1C; treat with diet and exercise. preDM classs helpful; will recheck Fall 2013       HYPERLIPIDEMIA LDL GOAL <130 02/10/2010     Lichen sclerosus et atrophicus of the vulva 03/18/2009     Diverticulosis of large intestine 01/31/2007     Problem list name updated by automated process. Provider to review       Disorder of bone and cartilage 01/23/2006     Problem list name updated by automated process. Provider to review       Abnormal blood chemistry 12/11/2002     elevated GLC, A1C; treat with diet and exercise. preDM classs info provided. She went and learned a few things; recheck in 3 months; if A1C>6, then Metformin recommended..  Problem list name updated by automated process. Provider to review       Hypothyroidism 12/09/2002     Problem list name updated by automated process. Provider to review        Past Medical History:    Diagnosis Date     Migraine, unspecified, without mention of intractable migraine without mention of status migrainosus      Other and unspecified hyperlipidemia 1990's    Pravachol, Zocor  Formulary issues, 6/02 Advicor     Unspecified hypothyroidism 1980's     Unspecified noninflammatory disorder of cervix     Atypical cervix - colposcopy 10/99     Past Surgical History:   Procedure Laterality Date     C NONSPECIFIC PROCEDURE  10/99    Colposcopy--reactive atypia         C REMOVAL GALLBLADDER  1980    [facility]       COLONOSCOPY      2006 and 2016     COLONOSCOPY N/A 11/16/2016    Procedure: COLONOSCOPY;  Surgeon: Adriano Roberto MD, MD;  Location:  GI     EYE SURGERY  2012    Bilateral cataract extraction     TUBAL LIGATION  1981     Current Outpatient Prescriptions   Medication Sig Dispense Refill     simvastatin (ZOCOR) 40 MG tablet Take 1 tablet (40 mg) by mouth At Bedtime 90 tablet 3     levothyroxine (SYNTHROID/LEVOTHROID) 112 MCG tablet Take 1 tablet (112 mcg) by mouth daily 90 tablet 0     clobetasol (TEMOVATE) 0.05 % cream use at bedtime weekly at bedtime for maintenance 15 g 1     guaiFENesin (MUCINEX) 600 MG 12 hr tablet Take 2 tablets by mouth 2 times daily as needed for congestion. 40 tablet 0     Chromium-Cinnamon 100-500 MCG-MG CAPS Take 1 tablet by mouth 2 times daily       NAPROSYN 500 MG OR TABS 1 TABLET TWICE DAILY PRN  1     PROSHIELD PLUS SKIN PROTECTANT 1 % EX CREA use as needed  up to QID 1 tube 1     CALCIUM + D 600-200 MG-UNIT OR TABS 1 TABLET DAILY       ASPIRIN 81 MG OR TABS ONE DAILY 100 3     MULTIVITAMIN OR 1 daily     OTC products: None, except as noted above    Allergies   Allergen Reactions     Neurontin [Gabapentin] Visual Disturbance     Amoxicillin Hives     Clindamycin Hcl Diarrhea   Latex Allergy: NO    Social History   Substance Use Topics     Smoking status: Never Smoker     Smokeless tobacco: Never Used     Alcohol use Yes      Comment: 3 drinks per month     History   Drug  "Use No     REVIEW OF SYSTEMS:                                                    CONSTITUTIONAL: NEGATIVE for fever, chills, change in weight  ENT/MOUTH: NEGATIVE for ear, mouth and throat problems  RESP:NEGATIVE for significant cough or SOB  CV: NEGATIVE for chest pain, palpitations or peripheral edema  GI: NEGATIVE for nausea, abdominal pain, heartburn, or change in bowel habits   female: Post-menopausal bleeding 3/2017, no bleeding since, US showed thickened EMS with irregularity - pt is to undergo diagnostic hysteroscopy, D&C, and possible morcellation   MUSCULOSKELETAL:NEGATIVE for significant arthralgias or myalgia  NEURO: NEGATIVE for weakness, dizziness or paresthesias  ENDOCRINE: NEGATIVE for temperature intolerance, skin/hair changes  HEME/ALLERGY/IMMUNE: NEGATIVE for bleeding problems  PSYCHIATRIC: NEGATIVE for changes in mood or affect    This document serves as a record of the services and decisions personally performed and made by Gela Bueno MD. It was created on her behalf by Lucretia Covarrubias, a trained medical scribe. The creation of this document is based on the provider's statements to the medical scribe.   Lucretia Covarrubias, 8:55 AM, May 11, 2017    EXAM:                                                    /78 (BP Location: Right arm, Patient Position: Chair, Cuff Size: Adult Regular)  Pulse 73  Temp 98.1  F (36.7  C) (Oral)  Resp 15  Ht 5' 1\" (1.549 m)  Wt 166 lb (75.3 kg)  SpO2 98%  BMI 31.37 kg/m2    GENERAL APPEARANCE: healthy, alert and no distress     HENT: ear canals and TM's normal, nose and mouth without ulcers or lesions, oral mucous membranes moist and oropharynx clear     NECK: no adenopathy and thyroid normal to palpation, no bruits      RESP: lungs clear to auscultation - no rales, rhonchi or wheezes     CV: regular rates and rhythm and normal S1 S2, no peripheral edema     ABDOMEN: soft, nontender, without hepatosplenomegaly or masses, aorta normal and bowel sounds normal     " MS: extremities normal- no gross deformities noted     SKIN: no suspicious lesions or rashes     NEURO: Normal strength and tone, sensory exam grossly normal, mentation intact and speech normal     PSYCH: mentation appears normal and affect normal/bright     LYMPHATICS: normal ant/post cervical, supraclavicular nodes    DIAGNOSTICS:                                                    EKG: appears normal, NSR, normal axis, normal intervals, no acute ST/T changes c/w ischemia, no LVH by voltage criteria    Recent Labs   Lab Test  04/17/17   0827  10/05/16   0838   12/16/11   1311   06/22/10   0804   HGB   --    --    --   14.5   --   13.9   PLT   --    --    --   220   --    --    NA  143  139   < >  139   < >  141   POTASSIUM  4.7  4.4   < >  4.4   < >  5.1   CR  0.61  0.60   < >  0.63   < >  0.74   A1C  5.9  5.8   < >   --    < >  5.8    < > = values in this interval not displayed.     IMPRESSION:                                                    Reason for surgery/procedure: Post menopausal bleeding, thickened endometrial stripe, possible endometrial polyp  Diagnosis/reason for consult:   Pre-operative evaluation assessment for diagnostic hysteroscopy, dilation and curettage, possible morcellation     The proposed surgical procedure is considered INTERMEDIATE risk.    REVISED CARDIAC RISK INDEX  The patient has the following serious cardiovascular risks for perioperative complications such as (MI, PE, VFib and 3  AV Block):  No serious cardiac risks  INTERPRETATION: 0 risks: Class I (very low risk - 0.4% complication rate)    The patient has the following additional risks for perioperative complications:  No identified additional risks      ICD-10-CM    1. Preop general physical exam Z01.818 EKG 12-lead complete w/read - Clinics   2. Post-menopausal bleeding N95.0     episode in March 2017, no recurrence;    3. Hypothyroidism, unspecified type E03.9     recent dose adjustment; will reassess labs in 6-8 weeks and  [de-identified] : 87 y/o F with hx of 14 top tooth extractions 03/16/2022, now with acute sinus pressure L- frontal, maxillary was put on Augmentin due to facial pain that she developed few days later. Pt states "felt sinus drainage". \par + mucous drainage- not using any nasal sprays or saline\par Pt also with no nasal congestion [FreeTextEntry1] : improvement but not complete resolution of the sinus sx, still some pressure and discomfort \par also feels sone congestion as well as left ear fullness and decrease hearing - uses a HA  adjust or renew meds as indicated.     RECOMMENDATIONS:                                                      APPROVAL GIVEN to proceed with proposed procedure, without further diagnostic evaluation    --Pt advised to avoid NSAIDS (Motrin, Ibuprofen, Aleve or Naprosyn) one week prior to surgery; If needed, Tylenol or Acetaminophen are fine to use.  --Meds reviewed; Patient is to take all scheduled medications on the day of surgery with a tiny sip of water.  --Pain medications, time off from work and FMLA following surgery deferred to surgeon.  --See patient instructions.     Gela Bueno MD  Internal Medicine  [facility] [place]    The information in this document, created by a medical scribe for me, accurately reflects the services I personally performed and the decisions made by me. I have reviewed and approved this document for accuracy.  Dr. Gela Bueno, 9:10 AM, May 11, 2017    Signed Electronically by: Gela Bueno MD    Copy of this evaluation report is provided to requesting physician.  [place] Preop Guidelines

## 2022-05-11 ENCOUNTER — APPOINTMENT (OUTPATIENT)
Dept: CT IMAGING | Facility: CLINIC | Age: 87
End: 2022-05-11
Payer: MEDICARE

## 2022-05-11 ENCOUNTER — OUTPATIENT (OUTPATIENT)
Dept: OUTPATIENT SERVICES | Facility: HOSPITAL | Age: 87
LOS: 1 days | End: 2022-05-11
Payer: MEDICARE

## 2022-05-11 DIAGNOSIS — Z41.9 ENCOUNTER FOR PROCEDURE FOR PURPOSES OTHER THAN REMEDYING HEALTH STATE, UNSPECIFIED: Chronic | ICD-10-CM

## 2022-05-11 DIAGNOSIS — J32.4 CHRONIC PANSINUSITIS: ICD-10-CM

## 2022-05-11 DIAGNOSIS — Z98.890 OTHER SPECIFIED POSTPROCEDURAL STATES: Chronic | ICD-10-CM

## 2022-05-11 DIAGNOSIS — J34.3 HYPERTROPHY OF NASAL TURBINATES: ICD-10-CM

## 2022-05-11 PROCEDURE — G1004: CPT

## 2022-05-11 PROCEDURE — 70486 CT MAXILLOFACIAL W/O DYE: CPT | Mod: 26,ME

## 2022-05-11 PROCEDURE — 70486 CT MAXILLOFACIAL W/O DYE: CPT | Mod: ME

## 2022-05-23 ENCOUNTER — NON-APPOINTMENT (OUTPATIENT)
Age: 87
End: 2022-05-23

## 2022-06-23 ENCOUNTER — NON-APPOINTMENT (OUTPATIENT)
Age: 87
End: 2022-06-23

## 2022-06-29 ENCOUNTER — NON-APPOINTMENT (OUTPATIENT)
Age: 87
End: 2022-06-29

## 2022-06-29 LAB — SARS-COV-2 N GENE NPH QL NAA+PROBE: NOT DETECTED

## 2022-06-30 ENCOUNTER — APPOINTMENT (OUTPATIENT)
Dept: OTOLARYNGOLOGY | Facility: CLINIC | Age: 87
End: 2022-06-30

## 2022-06-30 DIAGNOSIS — J34.89 OTHER SPECIFIED DISORDERS OF NOSE AND NASAL SINUSES: ICD-10-CM

## 2022-06-30 PROCEDURE — 61782 SCAN PROC CRANIAL EXTRA: CPT

## 2022-06-30 PROCEDURE — 30560 LYSIS INTRANASAL SYNECHIA: CPT | Mod: RT

## 2022-06-30 PROCEDURE — 31298Z NSL/SINS NDSC W/SINS DILAT: CUSTOM | Mod: LT

## 2022-06-30 PROCEDURE — 31295Z: CUSTOM | Mod: LT

## 2022-06-30 NOTE — PROCEDURE
[FreeTextEntry3] : IG left sided pansinus balloon, clear out frontal outflow on left. also found to have a dense adhesion on the right septum to inf turb - divided and packed \par tolerated well\par \par

## 2022-07-05 ENCOUNTER — NON-APPOINTMENT (OUTPATIENT)
Age: 87
End: 2022-07-05

## 2022-07-07 ENCOUNTER — APPOINTMENT (OUTPATIENT)
Dept: OTOLARYNGOLOGY | Facility: CLINIC | Age: 87
End: 2022-07-07

## 2022-07-07 ENCOUNTER — NON-APPOINTMENT (OUTPATIENT)
Age: 87
End: 2022-07-07

## 2022-07-07 VITALS
HEART RATE: 68 BPM | BODY MASS INDEX: 24.19 KG/M2 | TEMPERATURE: 97.6 F | WEIGHT: 120 LBS | SYSTOLIC BLOOD PRESSURE: 144 MMHG | HEIGHT: 59 IN | DIASTOLIC BLOOD PRESSURE: 67 MMHG

## 2022-07-07 LAB — EAR NOSE AND THROAT CULTURE: ABNORMAL

## 2022-07-07 PROCEDURE — 99024 POSTOP FOLLOW-UP VISIT: CPT

## 2022-07-07 PROCEDURE — 31237 NSL/SINS NDSC SURG BX POLYPC: CPT | Mod: 50,58

## 2022-07-07 PROCEDURE — 99213 OFFICE O/P EST LOW 20 MIN: CPT | Mod: 25

## 2022-07-07 RX ORDER — SULFAMETHOXAZOLE AND TRIMETHOPRIM 800; 160 MG/1; MG/1
800-160 TABLET ORAL TWICE DAILY
Qty: 28 | Refills: 0 | Status: COMPLETED | COMMUNITY
Start: 2022-04-07 | End: 2022-07-07

## 2022-07-07 RX ORDER — AMOXICILLIN AND CLAVULANATE POTASSIUM 875; 125 MG/1; MG/1
875-125 TABLET, COATED ORAL
Qty: 20 | Refills: 0 | Status: COMPLETED | COMMUNITY
Start: 2022-06-16 | End: 2022-07-07

## 2022-07-07 RX ORDER — AMOXICILLIN 500 MG/1
500 CAPSULE ORAL
Qty: 21 | Refills: 0 | Status: COMPLETED | COMMUNITY
Start: 2022-03-19 | End: 2022-07-07

## 2022-07-07 RX ORDER — HYDROCODONE BITARTRATE AND ACETAMINOPHEN 5; 325 MG/1; MG/1
5-325 TABLET ORAL
Qty: 20 | Refills: 0 | Status: COMPLETED | COMMUNITY
Start: 2022-06-16 | End: 2022-07-07

## 2022-07-07 RX ORDER — PROMETHAZINE HYDROCHLORIDE 12.5 MG/1
12.5 TABLET ORAL
Qty: 6 | Refills: 0 | Status: COMPLETED | COMMUNITY
Start: 2022-06-16 | End: 2022-07-07

## 2022-07-07 RX ORDER — PREDNISONE 10 MG/1
10 TABLET ORAL
Qty: 27 | Refills: 0 | Status: COMPLETED | COMMUNITY
Start: 2022-06-16 | End: 2022-07-07

## 2022-07-07 RX ORDER — TRIAZOLAM 0.12 MG/1
0.12 TABLET ORAL
Qty: 2 | Refills: 0 | Status: COMPLETED | COMMUNITY
Start: 2022-06-16 | End: 2022-07-07

## 2022-07-07 RX ORDER — AMOXICILLIN AND CLAVULANATE POTASSIUM 500; 125 MG/1; MG/1
500-125 TABLET, FILM COATED ORAL
Qty: 21 | Refills: 0 | Status: COMPLETED | COMMUNITY
Start: 2022-03-21 | End: 2022-07-07

## 2022-07-07 NOTE — DATA REVIEWED
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

## 2022-07-07 NOTE — CONSULT LETTER
[Please see my note below.] : Please see my note below. [FreeTextEntry1] : Dear Dr. DARYN LÓPEZ \par I had the pleasure of evaluating your patient ALPA BHATIA, thank you for allowing us to participate in their care. please see full note detailing our visit below.\par If you have any questions, please do not hesitate to call me and I would be happy to discuss further. \par \par Josue Anderson M.D.\par Attending Physician,  \par Department of Otolaryngology - Head and Neck Surgery\par [facility] \par Office: [phone]\par Fax: [phone]\par \par

## 2022-07-07 NOTE — ASSESSMENT
[FreeTextEntry1] : pt s/p left pansinus, right PACO 6/30/22. doing well post op, happy with results thus far \par Patient was debrided bilaterally with rigid suction as a result of nasal crusting. breathing much improved after detriment. \par - Continues saline irrigations\par - Flonase\par - follow up

## 2022-07-07 NOTE — REVIEW OF SYSTEMS
[Post Nasal Drip] : post nasal drip [As Noted in HPI] : as noted in HPI [Nasal Congestion] : nasal congestion [Hoarseness] : hoarseness [Negative] : Heme/Lymph [de-identified] : ear feels blocked [FreeTextEntry1] : when taking afrin on the second day, patient had allergic reaction. Face got puffy and hot to touch

## 2022-07-07 NOTE — END OF VISIT
[FreeTextEntry3] : I personally saw and examined CHEYANNE WEBSTER in detail. I spoke to Leidy VALDES regarding the assessment and plan of care.  I preformed the procedures and I reviewed the above assessment and plan of care, and agree. I have made changes in changes in the body of the note where appropriate.\par

## 2022-07-07 NOTE — PROCEDURE
[FreeTextEntry6] : procedure - bilateral endoscopic nasal  debridement\par Bilateral nasal cavities inspected with #0 ridged sinus endoscope. septum appeared midline and turbinates well reduced. bilateral middle meatuses were explored and suction and agitator were used to open ostiums and open any forming scar bands. all sinuses were explored and open at end of procedure \par still with right NSD was not aggressive ly adhesion to allow to continue to heal and avoid re- traumatizing \par - Nasal crusting

## 2022-07-07 NOTE — HISTORY OF PRESENT ILLNESS
[de-identified] : 86 year old female presents today to discuss culture results\par s/p IG left sided pansinus balloon, clear out frontal outflow on left. also found to have a dense adhesion on the right septum to inf turb - divided and packed 06/30/22\par Reports intermittent congestion, left maxillary sinus pressure and PND.\par States has redness on the left check which usually indicates infection.\par Still with pressure in the left ear. No pain or drainage.\par Continues saline irrigations 3 times daily\par Completed antibiotics and steroids.\par

## 2022-07-11 ENCOUNTER — APPOINTMENT (OUTPATIENT)
Dept: UROGYNECOLOGY | Facility: CLINIC | Age: 87
End: 2022-07-11

## 2022-07-11 PROCEDURE — 99213 OFFICE O/P EST LOW 20 MIN: CPT

## 2022-07-11 NOTE — PHYSICAL EXAM
[No Acute Distress] : in no acute distress [Well developed] : well developed [Well Nourished] : ~L well nourished [Good Hygeine] : demonstrates good hygeine [Oriented x3] : oriented to person, place, and time [Normal Memory] : ~T memory was ~L unimpaired [Normal Mood/Affect] : mood and affect are normal [Soft, Nontender] : the abdomen was soft and nontender [Warm and Dry] : was warm and dry to touch [Normal Gait] : gait was normal [Vulvar Atrophy] : vulvar atrophy [Labia Majora] : were normal [Labia Minora] : were normal [Normal] : was normal [Atrophy] : atrophy [No Bleeding] : there was no active vaginal bleeding [Anxiety] : patient is not anxious [Tenderness] : ~T no ~M abdominal tenderness observed [Distended] : not distended [Inguinal LAD] : no adenopathy was noted in the inguinal lymph nodes

## 2022-07-11 NOTE — PROCEDURE
[Good Fit] : fits well [Pessary Inserted] : inserted [Pessary Washed] : washed [None] : no bleeding [Medication Review] : Medicaiton Review: Patient verbalizes understanding of risks and benefits [Refit] : refit is not needed [Erosion] : no evidence of erosion [Erythema] : no erythema [Discharge] : no vaginal discharge [Infection] : no evidence of infection [FreeTextEntry1] : Incontinence dish #5 [FreeTextEntry3] : Replens, Yuvafem [FreeTextEntry8] : Reinforced daily pericare.  Continue pessary self-care.

## 2022-07-11 NOTE — DISCUSSION/SUMMARY
[FreeTextEntry1] : Pelvic organ prolapse, HANY:\par -Continue use of the Incontinence dish # 5.\par -Precaution and instructions were discussed.\par \par Atrophy:\par -Continue Yuvafem twice a week\par -She may continue with Replens on alternating days. \par \par -RTO in 3 months or sooner for follow up on pelvic prolapse.  IF pt have any problems/concern to call office.  PT aware and agrees.\par

## 2022-07-11 NOTE — HISTORY OF PRESENT ILLNESS
[FreeTextEntry1] : Love, 85 y/o female presents for follow up of pelvic prolapse and HANY. PT using Incontinence dish #5. She leaves it in for 4 days and removes it for 3 days. She is able to do self care and comfortable with it. She applies Replens with care and is also using Yuvafem twice a week. Denies any bleeding, prolapse past the pessary, issues with urination or moving BM. PT happy with support.  No changes since last visit.\par \par

## 2022-07-13 ENCOUNTER — NON-APPOINTMENT (OUTPATIENT)
Age: 87
End: 2022-07-13

## 2022-07-19 ENCOUNTER — APPOINTMENT (OUTPATIENT)
Dept: OTOLARYNGOLOGY | Facility: CLINIC | Age: 87
End: 2022-07-19

## 2022-07-19 PROCEDURE — 31237 NSL/SINS NDSC SURG BX POLYPC: CPT | Mod: 50

## 2022-07-19 PROCEDURE — 99213 OFFICE O/P EST LOW 20 MIN: CPT | Mod: 25

## 2022-07-19 NOTE — ASSESSMENT
[FreeTextEntry1] : pt s/p left pansinus, right PACO 6/30/22, right scar band packed today. Doing well post op, happy with results thus far, but with persistent bilateral nasal congestion and PND.\par Patient was debrided bilaterally with rigid suction as a result of nasal crusting. breathing much improved after detriment. \par - Continues saline irrigations\par - Flonase\par - Avoid nose blowing or sneezing through the nose\par - Can consider office procedure to reduce turbinates for nasal congestion\par - Follow up\par

## 2022-07-19 NOTE — HISTORY OF PRESENT ILLNESS
[de-identified] : 87 year old female s/p left sided pansinus balloon with right PACO 06/30/22\par States breathing and snoring improved since procedure.\par Reports continued bilateral nasal congestion and PND.\par Mild pressure on the left side on the nose.\par Intermittent pressure in the left ear. No pain or drainage.\par Continues Flonase QD and saline irrigations 3 times daily.

## 2022-07-19 NOTE — PROCEDURE
[FreeTextEntry6] : procedure - bilateral endoscopic nasal  debridement\par Bilateral nasal cavities inspected with #0 ridged sinus endoscope. septum appeared midline and turbinates well reduced. bilateral middle meatuses were explored and suction and agitator were used to open ostiums and open any forming scar bands. all sinuses were explored and open at end of procedure \par still with right NSD was not aggressive ly adhesion to allow to continue to heal and avoid re- traumatizing \par - Nasal crusting and thick secretions cleared\par - Right scar band packed

## 2022-07-19 NOTE — CONSULT LETTER
[Please see my note below.] : Please see my note below. [FreeTextEntry1] : Dear Dr. DARYN LÓPEZ \par I had the pleasure of evaluating your patient ALPA BHATIA, thank you for allowing us to participate in their care. please see full note detailing our visit below.\par If you have any questions, please do not hesitate to call me and I would be happy to discuss further. \par \par Josue Anderson M.D.\par Attending Physician,  \par Department of Otolaryngology - Head and Neck Surgery\[facility] \par Office: [phone]\par Fax: [phone]\par \par

## 2022-07-19 NOTE — REVIEW OF SYSTEMS
[Post Nasal Drip] : post nasal drip [As Noted in HPI] : as noted in HPI [Nasal Congestion] : nasal congestion [Hoarseness] : hoarseness [Negative] : Heme/Lymph [de-identified] : ear feels blocked [FreeTextEntry1] : when taking afrin on the second day, patient had allergic reaction. Face got puffy and hot to touch

## 2022-07-19 NOTE — DATA REVIEWED
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

## 2022-07-19 NOTE — PHYSICAL EXAM
[Normal] : inferior turbinates and middle turbinates are normal [de-identified] : crusting and thick secretions

## 2022-07-22 ENCOUNTER — APPOINTMENT (OUTPATIENT)
Dept: INTERNAL MEDICINE | Facility: CLINIC | Age: 87
End: 2022-07-22

## 2022-07-22 VITALS
DIASTOLIC BLOOD PRESSURE: 70 MMHG | HEART RATE: 72 BPM | BODY MASS INDEX: 23.79 KG/M2 | SYSTOLIC BLOOD PRESSURE: 134 MMHG | HEIGHT: 59 IN | TEMPERATURE: 97.9 F | OXYGEN SATURATION: 96 % | WEIGHT: 118 LBS | RESPIRATION RATE: 14 BRPM

## 2022-07-22 DIAGNOSIS — R93.5 ABNORMAL FINDINGS ON DIAGNOSTIC IMAGING OF OTHER ABDOMINAL REGIONS, INCLUDING RETROPERITONEUM: ICD-10-CM

## 2022-07-22 DIAGNOSIS — E03.8 OTHER SPECIFIED HYPOTHYROIDISM: ICD-10-CM

## 2022-07-22 PROCEDURE — G0439: CPT

## 2022-07-22 NOTE — HISTORY OF PRESENT ILLNESS
[de-identified] : Pt is here for cpe. Had recent sinus and oral issues requiring antibiotics and procedures. \par \par Needs to go for shingrix. \par \par Has osteoporosis. Was on forteo for a while.

## 2022-07-22 NOTE — HEALTH RISK ASSESSMENT
[Patient reported mammogram was normal] : Patient reported mammogram was normal [Never] : Never [Yes] : Yes [Monthly or less (1 pt)] : Monthly or less (1 point) [1 or 2 (0 pts)] : 1 or 2 (0 points) [No] : In the past 12 months have you used drugs other than those required for medical reasons? No [No falls in past year] : Patient reported no falls in the past year [0] : 2) Feeling down, depressed, or hopeless: Not at all (0) [PHQ-2 Negative - No further assessment needed] : PHQ-2 Negative - No further assessment needed [[id number]] : 0 [MammogramDate] : 08/21

## 2022-07-23 DIAGNOSIS — R31.29 OTHER MICROSCOPIC HEMATURIA: ICD-10-CM

## 2022-07-23 LAB
25(OH)D3 SERPL-MCNC: 58.6 NG/ML
ALBUMIN SERPL ELPH-MCNC: 4 G/DL
ALP BLD-CCNC: 64 U/L
ALT SERPL-CCNC: 19 U/L
ANION GAP SERPL CALC-SCNC: 11 MMOL/L
APPEARANCE: CLEAR
AST SERPL-CCNC: 31 U/L
BACTERIA: NEGATIVE
BASOPHILS # BLD AUTO: 0.03 K/UL
BASOPHILS NFR BLD AUTO: 0.7 %
BILIRUB SERPL-MCNC: 0.3 MG/DL
BILIRUBIN URINE: NEGATIVE
BLOOD URINE: NEGATIVE
BUN SERPL-MCNC: 9 MG/DL
CALCIUM SERPL-MCNC: 9.5 MG/DL
CHLORIDE SERPL-SCNC: 100 MMOL/L
CHOLEST SERPL-MCNC: 245 MG/DL
CO2 SERPL-SCNC: 26 MMOL/L
COLOR: COLORLESS
CREAT SERPL-MCNC: 0.67 MG/DL
EGFR: 85 ML/MIN/1.73M2
EOSINOPHIL # BLD AUTO: 0.16 K/UL
EOSINOPHIL NFR BLD AUTO: 3.8 %
ESTIMATED AVERAGE GLUCOSE: 111 MG/DL
FOLATE SERPL-MCNC: 19.9 NG/ML
GLUCOSE QUALITATIVE U: NEGATIVE
GLUCOSE SERPL-MCNC: 80 MG/DL
HBA1C MFR BLD HPLC: 5.5 %
HCT VFR BLD CALC: 43.6 %
HDLC SERPL-MCNC: 77 MG/DL
HGB BLD-MCNC: 14.6 G/DL
HYALINE CASTS: 0 /LPF
IMM GRANULOCYTES NFR BLD AUTO: 0.2 %
KETONES URINE: NEGATIVE
LDLC SERPL CALC-MCNC: 153 MG/DL
LEUKOCYTE ESTERASE URINE: NEGATIVE
LYMPHOCYTES # BLD AUTO: 1 K/UL
LYMPHOCYTES NFR BLD AUTO: 23.8 %
MAN DIFF?: NORMAL
MCHC RBC-ENTMCNC: 31.4 PG
MCHC RBC-ENTMCNC: 33.5 GM/DL
MCV RBC AUTO: 93.8 FL
MICROSCOPIC-UA: NORMAL
MONOCYTES # BLD AUTO: 0.54 K/UL
MONOCYTES NFR BLD AUTO: 12.8 %
NEUTROPHILS # BLD AUTO: 2.47 K/UL
NEUTROPHILS NFR BLD AUTO: 58.7 %
NITRITE URINE: NEGATIVE
NONHDLC SERPL-MCNC: 168 MG/DL
PH URINE: 7.5
PLATELET # BLD AUTO: 339 K/UL
POTASSIUM SERPL-SCNC: 4.4 MMOL/L
PROT SERPL-MCNC: 6.5 G/DL
PROTEIN URINE: NEGATIVE
RBC # BLD: 4.65 M/UL
RBC # FLD: 13.5 %
RED BLOOD CELLS URINE: 8 /HPF
SODIUM SERPL-SCNC: 137 MMOL/L
SPECIFIC GRAVITY URINE: 1.01
SQUAMOUS EPITHELIAL CELLS: 1 /HPF
TRIGL SERPL-MCNC: 72 MG/DL
TSH SERPL-ACNC: 3.38 UIU/ML
URATE SERPL-MCNC: 3.7 MG/DL
UROBILINOGEN URINE: NORMAL
VIT B12 SERPL-MCNC: 1206 PG/ML
WBC # FLD AUTO: 4.21 K/UL
WHITE BLOOD CELLS URINE: 0 /HPF

## 2022-07-27 ENCOUNTER — OUTPATIENT (OUTPATIENT)
Dept: OUTPATIENT SERVICES | Facility: HOSPITAL | Age: 87
LOS: 1 days | End: 2022-07-27
Payer: MEDICARE

## 2022-07-27 ENCOUNTER — APPOINTMENT (OUTPATIENT)
Dept: RADIOLOGY | Facility: CLINIC | Age: 87
End: 2022-07-27

## 2022-07-27 DIAGNOSIS — M81.0 AGE-RELATED OSTEOPOROSIS WITHOUT CURRENT PATHOLOGICAL FRACTURE: ICD-10-CM

## 2022-07-27 DIAGNOSIS — Z41.9 ENCOUNTER FOR PROCEDURE FOR PURPOSES OTHER THAN REMEDYING HEALTH STATE, UNSPECIFIED: Chronic | ICD-10-CM

## 2022-07-27 DIAGNOSIS — Z98.890 OTHER SPECIFIED POSTPROCEDURAL STATES: Chronic | ICD-10-CM

## 2022-07-27 PROCEDURE — 77080 DXA BONE DENSITY AXIAL: CPT

## 2022-07-27 PROCEDURE — 77080 DXA BONE DENSITY AXIAL: CPT | Mod: 26

## 2022-08-02 ENCOUNTER — APPOINTMENT (OUTPATIENT)
Dept: OTOLARYNGOLOGY | Facility: CLINIC | Age: 87
End: 2022-08-02

## 2022-08-02 PROCEDURE — 31575 DIAGNOSTIC LARYNGOSCOPY: CPT

## 2022-08-02 PROCEDURE — 99214 OFFICE O/P EST MOD 30 MIN: CPT | Mod: 25

## 2022-08-02 PROCEDURE — 31237 NSL/SINS NDSC SURG BX POLYPC: CPT | Mod: 50

## 2022-08-02 NOTE — DATA REVIEWED
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

## 2022-08-02 NOTE — PROCEDURE
[FreeTextEntry6] : procedure - bilateral endoscopic nasal  debridement\par Bilateral nasal cavities inspected with #0 ridged sinus endoscope. septum appeared midline and turbinates well reduced. bilateral middle meatuses were explored and suction and agitator were used to open ostiums and open any forming scar bands. all sinuses were explored and open at end of procedure \par still with right NSD\par - Nasal crusting and thick secretions cleared\par - Right scar band resolved [de-identified] : Procedure performed: laryngeal Endoscopy- Diagnostic\par Pre-op/post op indication: dysphonia\par Verbal and/or written consent obtained from patient, Patient was unable to cooperate with mirror\par Scope #: 3, flexible fiber optic telescope used \par Scope was introduced through the nose passed on the floor of the nose to the nasopharynx and then followed down the soft palate to the lower pharynx. The tongue Base, Larynx, Hypopharynx were examined. Base of tongue was symmetric, vallecular was clear, epiglottis was not deformed, subglottis/ pyriform and posterior pharyngeal walls were clear. + moderate acid reflux. No erythema, pooling of secretions, masses or lesions. Airway patent, no foreign body visualized. Postcricoid area with moderate erythema and mild VF edema. + intra-artenoid bar. No pooling of secretions. True vocal cords, vestibular folds, ventricles, pyriform sinuses, and aryepiglottic folds appear normal bilaterally. Vocal cords mobile with good contact b/l.\par

## 2022-08-02 NOTE — REVIEW OF SYSTEMS
[Post Nasal Drip] : post nasal drip [As Noted in HPI] : as noted in HPI [Nasal Congestion] : nasal congestion [Hoarseness] : hoarseness [Negative] : Heme/Lymph [de-identified] : ear feels blocked [FreeTextEntry1] : when taking afrin on the second day, patient had allergic reaction. Face got puffy and hot to touch

## 2022-08-02 NOTE — HISTORY OF PRESENT ILLNESS
[de-identified] : 87 year old female s/p left sided pansinus balloon with right PACO 06/30/22\par States relief of snoring since procedure. \par Breathing continues to improve, no current nasal congestion.\par Reports post nasal drip and voice hoarseness \par Relief of sinus pain/ pressure.\par Still with occasional left ear pressure.\par Continues Flonase QD and saline irrigations 3 times daily.\par no vision changes, double vision, salty taste or clear D/c from the nose

## 2022-08-02 NOTE — CONSULT LETTER
[Please see my note below.] : Please see my note below. [FreeTextEntry1] : Dear Dr. DARYN LÓPEZ \par I had the pleasure of evaluating your patient ALPA BHATIA, thank you for allowing us to participate in their care. please see full note detailing our visit below.\par If you have any questions, please do not hesitate to call me and I would be happy to discuss further. \par \par Josue Anderson M.D.\par Attending Physician,  \par Department of Otolaryngology - Head and Neck Surgery\par [facility] \par Office: [phone]\par Fax: [phone]\par \par

## 2022-08-02 NOTE — ASSESSMENT
[FreeTextEntry1] : pt s/p left pansinus, right PACO 6/30/22, right scar band packed last visit, today scar band resolved and sinuses patent. Doing well post op, happy with results thus far, but with resolved nasal congestion but continued PND.\par Patient was debrided bilaterally with rigid suction as a result of nasal crusting. breathing much improved after detriment. \par - Continues saline irrigations\par - Flonase\par - Avoid nose blowing or sneezing through the nose\par - Can consider office procedure to reduce turbinates for nasal congestion\par - Follow up\par \par \par pt also with continued PND and voice hoarseness, on exam with moderate acid reflux\par will proceed to start lifestyle regiment to reduce overproduction of acid and reduce laryngeal reflux including avoiding caffein, alcohol, eating before bed, spicy and fatty foods, and head elevation at night etc. Handout detailing regiment also given\par

## 2022-08-23 ENCOUNTER — APPOINTMENT (OUTPATIENT)
Dept: OTOLARYNGOLOGY | Facility: CLINIC | Age: 87
End: 2022-08-23

## 2022-08-23 LAB
APPEARANCE: CLEAR
BACTERIA: NEGATIVE
BILIRUBIN URINE: NEGATIVE
BLOOD URINE: NEGATIVE
COLOR: NORMAL
GLUCOSE QUALITATIVE U: NEGATIVE
HYALINE CASTS: 0 /LPF
KETONES URINE: NEGATIVE
LEUKOCYTE ESTERASE URINE: NEGATIVE
MICROSCOPIC-UA: NORMAL
NITRITE URINE: NEGATIVE
PH URINE: 7
PROTEIN URINE: NEGATIVE
RED BLOOD CELLS URINE: 0 /HPF
SPECIFIC GRAVITY URINE: 1.01
SQUAMOUS EPITHELIAL CELLS: 1 /HPF
UROBILINOGEN URINE: NORMAL
WHITE BLOOD CELLS URINE: 1 /HPF

## 2022-08-23 PROCEDURE — 31237 NSL/SINS NDSC SURG BX POLYPC: CPT | Mod: 50

## 2022-08-23 PROCEDURE — 99214 OFFICE O/P EST MOD 30 MIN: CPT | Mod: 25

## 2022-08-23 PROCEDURE — 31575 DIAGNOSTIC LARYNGOSCOPY: CPT

## 2022-08-23 NOTE — ASSESSMENT
[FreeTextEntry1] : Ms. BHATIA is a 87 year female pt s/p L pansinus, right PACO 6/30/22, right scar band packed last visit, today scar band resolved and sinuses patent. Doing well post op, some yellow mucous with sinus rinse and hoarseness  but with resolved nasal congestion. No evidence of infection seen on scope today, + LPRD\par \par Patient was debrided bilaterally with rigid suction as a result of nasal crusting. breathing much improved after detriment and crusting much improved from last visit. healing well \par  \par - Continues saline irrigations 3x daily\par - continue  Flonase 2 sprays once a day\par - Avoid nose blowing or sneezing through the nose\par - Can consider office procedure to reduce turbinates for nasal congestion\par - Follow up 2-3 months\par \par pt also with continued PND and voice hoarseness, on exam with moderate acid reflux\par will proceed to start lifestyle regiment to reduce overproduction of acid and reduce laryngeal reflux including avoiding caffein, alcohol, eating before bed, spicy and fatty foods, and head elevation at night etc. Handout detailing regiment also given\par - Rx Omeprazole 40 mg daily x 6 weeks\par - continue reflux precautions

## 2022-08-23 NOTE — END OF VISIT
[FreeTextEntry3] : I personally saw and examined ALPA BHATIA in detail.  I spoke to DENI Villalobos regarding the assessment and plan of care. I performed the procedures and relevant physical exam.  I have reviewed the above assessment and plan of care and I agree.  I have made changes to the body of the note wherever necessary and appropriate.\par

## 2022-08-23 NOTE — PROCEDURE
[FreeTextEntry3] : procedure - bilateral endoscopic nasal debridement\par Bilateral nasal cavities inspected with #0 ridged sinus endoscope. bilateral middle meatuses were explored and suction and agitator were used to open ostiums and open any forming scar bands. all sinuses were explored and open at end of procedure\par  [de-identified] : Procedure performed: laryngeal Endoscopy- Diagnostic\par Pre-op/post op indication: dysphagia\par Verbal and/or written consent obtained from patient, Patient was unable to cooperate with mirror\par Scope #: 3, flexible fiber optic telescope used \par Scope was introduced through the nose passed on the floor of the nose to the nasopharynx and then followed down the soft palate to the lower pharynx. The tongue Base, Larynx, Hypopharynx were examined. Base of tongue was symmetric, vallecular was clear, epiglottis was not deformed, subglottis/ pyriform and posterior pharyngeal walls were clear. No erythema, edema, pooling of secretions, masses or lesions. Airway patent, no foreign body visualized. Postcricoid area with moderate erythema and mild edema. + intra-artenoid bar. No pooling of secretions. True vocal cords, vestibular folds, ventricles, pyriform sinuses, and aryepiglottic folds appear normal bilaterally. Vocal cords mobile with good contact b/l.\par .\par

## 2022-08-23 NOTE — REVIEW OF SYSTEMS
[Post Nasal Drip] : post nasal drip [As Noted in HPI] : as noted in HPI [Nasal Congestion] : nasal congestion [Hoarseness] : hoarseness [Negative] : Heme/Lymph [de-identified] : ear feels blocked [FreeTextEntry1] : when taking afrin on the second day, patient had allergic reaction. Face got puffy and hot to touch

## 2022-08-23 NOTE — PHYSICAL EXAM
[Nasal Endoscopy Performed] : nasal endoscopy was performed, see procedure section for findings [Normal] : inferior turbinates and middle turbinates are normal [de-identified] : nasal crusting much improved

## 2022-08-23 NOTE — REASON FOR VISIT
[Subsequent Evaluation] : a subsequent evaluation for [FreeTextEntry2] : s/p L balloon sinuplasty and R side adhesion removal

## 2022-08-23 NOTE — CONSULT LETTER
[FreeTextEntry1] : Dear Dr. DARYN LÓPEZ \par I had the pleasure of evaluating your patient ALPA BHATIA, thank you for allowing us to participate in their care. please see full note detailing our visit below.\par If you have any questions, please do not hesitate to call me and I would be happy to discuss further. \par \par Josue Anderson M.D.\par Attending Physician,  \par Department of Otolaryngology - Head and Neck Surgery\par [facility] \par Office: [phone]\par Fax: [phone]\par \par

## 2022-08-23 NOTE — HISTORY OF PRESENT ILLNESS
[de-identified] : 87 year old female s/p left sided pansinus balloon with right PACO 06/30/22\par States relief of snoring since procedure. \par Breathing continues to improve, no current nasal congestion.\par Reports post nasal drip and voice hoarseness \par Relief of sinus pain/ pressure.\par Still with occasional left ear pressure.\par Continues Flonase QD and saline irrigations 3 times daily.\par no vision changes, double vision, salty taste or clear D/c from the nose  [FreeTextEntry1] : pt doing sinus rinse and still has some yellow discharge, she uses flonase daily, denies sinus pressure, feels she has PND and hoarseness and less pressure in her ear\par taking Omeprazole daily

## 2022-09-14 ENCOUNTER — APPOINTMENT (OUTPATIENT)
Dept: ENDOCRINOLOGY | Facility: CLINIC | Age: 87
End: 2022-09-14

## 2022-09-14 VITALS
OXYGEN SATURATION: 99 % | SYSTOLIC BLOOD PRESSURE: 129 MMHG | HEART RATE: 80 BPM | BODY MASS INDEX: 23.79 KG/M2 | HEIGHT: 59 IN | DIASTOLIC BLOOD PRESSURE: 80 MMHG | WEIGHT: 118 LBS

## 2022-09-14 PROCEDURE — 99205 OFFICE O/P NEW HI 60 MIN: CPT

## 2022-09-15 NOTE — PHYSICAL EXAM
[Alert] : alert [Well Nourished] : well nourished [No Acute Distress] : no acute distress [Well Developed] : well developed [Normal Sclera/Conjunctiva] : normal sclera/conjunctiva [EOMI] : extra ocular movement intact [No Proptosis] : no proptosis [Normal Oropharynx] : the oropharynx was normal [Thyroid Not Enlarged] : the thyroid was not enlarged [No Respiratory Distress] : no respiratory distress [No Accessory Muscle Use] : no accessory muscle use [Clear to Auscultation] : lungs were clear to auscultation bilaterally [Normal S1, S2] : normal S1 and S2 [Normal Rate] : heart rate was normal [No Edema] : no peripheral edema [Pedal Pulses Normal] : the pedal pulses are present [Normal Bowel Sounds] : normal bowel sounds [Not Tender] : non-tender [Not Distended] : not distended [Soft] : abdomen soft [Normal Anterior Cervical Nodes] : no anterior cervical lymphadenopathy [Normal Posterior Cervical Nodes] : no posterior cervical lymphadenopathy [No Spinal Tenderness] : no spinal tenderness [Spine Straight] : spine straight [No Stigmata of Cushings Syndrome] : no stigmata of Cushings Syndrome [Normal Gait] : normal gait [Normal Strength/Tone] : muscle strength and tone were normal [No Rash] : no rash [Normal Reflexes] : deep tendon reflexes were 2+ and symmetric [No Tremors] : no tremors [Oriented x3] : oriented to person, place, and time [Acanthosis Nigricans] : no acanthosis nigricans [de-identified] : nodule appreciated [de-identified] : patient has a irregularity in her rhythm

## 2022-09-15 NOTE — HISTORY OF PRESENT ILLNESS
[FreeTextEntry1] : Ms. ALPA BHATIA  is a 87 year old female with past medical history of Osteoporosis, Celiac Disease who presents for management of her osteoporosis. \par \par Bone History:\par Menopause: Last menstrual period 50s\par Osteoporosis diagnosed in 2000s\par Fracture history: L Femur fx (2018) (hairline fracture, ortho suspected it was atypical)\par Family history: Brother osteoporosis\par Treatment: Fosamax, Forteo (inj site reaction), Prolia ([id number]), Tymlos (intolerance)\par \par Falls: No\par Height loss: yes\par Kidney stones: No\par Dental health: Regular appointments, no history of implants, no upcoming procedures planned\par Exercise: none\par Dairy intake: yes\par Calcium supplements: yes\par Multivitamin: None\par Vitamin D supplements: Vitamin D3 \par \par Osteoporosis risk factors include: Postmenopausal status,  race, prior fracture, height loss, family history\par

## 2022-09-15 NOTE — ASSESSMENT
[FreeTextEntry1] : 87 year old female with past medical history of Osteoporosis, Celiac Disease who presents for management of her osteoporosis\par \par 1. Osteoporosis\par Patient warrants treatment for her OP given that she is at high risk for fractures. \par Diet, weight bearing and muscle strengthening exercise and fall prevention were discussed. Smoking cessation and alcohol consumption (no more then an average 2 drinks/day) was discussed. \par I counseled the patient regarding calcium and vitamin D intake. Calcium 1200 mg daily from diet and supplements (to be taken in divided doses as no more than 500-600 mg can be absorbed at one time)\par We discussed the potential benefits and risks of the osteoanabolic and antiresorptive classes of pharmacologic osteoporosis therapy. If history of radiation therapy, teriparatide and abaloparatide are contraindicated, however, we can consider romosozumab therapy. We discussed the potential benefits and risks of romosozumab at length, including but not limited to osteonecrosis of the jaw, atypical femoral fracture, cardiovascular risk including heart attack and stroke. We also discussed the potential benefits and risks of antiresorptive osteoporosis therapy with denosumab or zoledronic acid at length, including but not limited to osteonecrosis of the jaw and atypical femoral fracture. We discussed that denosumab must be dosed every 6 months due to rebound increase in bone breakdown with abrupt discontinuation of therapy, with transition to bisphosphonate therapy prior to a "drug holiday." Raloxifene is a weak pharmacologic agent for osteoporosis, with some vertebral fracture efficacy but no efficacy for nonvertebral fractures.\par \par Patient is limited in her options given she cannot take bisphosphonates, Tymlos or Forteo\par She is concerned about Prolia but I do not suspect the Prolia played a role in her fracture\par Options are Prolia vs Evenity\par Discussed risks and benefits\par \par Asked patient to follow up with PCP regarding irregular rhythm\par \par 2. Thyroid Nodule\par Explained thyroid nodules and the risk of cancer, FNA and management\par Will send for US \par TFTs stable\par

## 2022-09-15 NOTE — CONSULT LETTER
[Dear  ___] : Dear  [unfilled], [Consult Letter:] : I had the pleasure of evaluating your patient, [unfilled]. [Please see my note below.] : Please see my note below. [Consult Closing:] : Thank you very much for allowing me to participate in the care of this patient.  If you have any questions, please do not hesitate to contact me. [Sincerely,] : Sincerely, [FreeTextEntry3] : Jesica Roman MS. DO.\par Endocrinology, Diabetes and Metabolism\par [address] [place] [address]\[place] [address]\par Tel [phone]\par Fax [phone]\par

## 2022-09-16 NOTE — H&P PST ADULT - NS PRO ABUSE SCREEN SUSPICION NEGLECT YN
RN from Dr. Tran's office PARDEEP [place] - RN is wondering if patient should be decreasing the dose of Lisinopril due to a recent BP value drop - RN advised that the patient had just seen an [place] Cardiologist for possible transfer of care in May 2022 - If patient wishes to continue receiving their cardiac care from Dr. Stephen - then we would advise for multiple BP's to be checked at home by patient or patient's aid if they have one.  A decrease in medication would be possible given evidence of consistent hypotension, or symptoms.  RN understands and will contact office back with concerns.    no

## 2022-09-21 ENCOUNTER — NON-APPOINTMENT (OUTPATIENT)
Age: 87
End: 2022-09-21

## 2022-09-21 ENCOUNTER — APPOINTMENT (OUTPATIENT)
Dept: INTERNAL MEDICINE | Facility: CLINIC | Age: 87
End: 2022-09-21

## 2022-09-21 PROCEDURE — 93000 ELECTROCARDIOGRAM COMPLETE: CPT

## 2022-09-21 PROCEDURE — 99213 OFFICE O/P EST LOW 20 MIN: CPT | Mod: 25

## 2022-09-21 NOTE — PHYSICAL EXAM
[No Edema] : there was no peripheral edema [Normal] : affect was normal and insight and judgment were intact [de-identified] : one ectopic heard

## 2022-09-21 NOTE — HISTORY OF PRESENT ILLNESS
[FreeTextEntry1] : needs ekg for irreg beats [de-identified] : Pt is here for an ekg. she saw her endocrinologist who notices irregular beats. Pt denies palpitations, sob, chest pain. She has a cardiology appt coming up in October.

## 2022-09-23 ENCOUNTER — APPOINTMENT (OUTPATIENT)
Dept: ULTRASOUND IMAGING | Facility: CLINIC | Age: 87
End: 2022-09-23

## 2022-09-23 ENCOUNTER — OUTPATIENT (OUTPATIENT)
Dept: OUTPATIENT SERVICES | Facility: HOSPITAL | Age: 87
LOS: 1 days | End: 2022-09-23
Payer: MEDICARE

## 2022-09-23 DIAGNOSIS — E04.1 NONTOXIC SINGLE THYROID NODULE: ICD-10-CM

## 2022-09-23 DIAGNOSIS — Z98.890 OTHER SPECIFIED POSTPROCEDURAL STATES: Chronic | ICD-10-CM

## 2022-09-23 DIAGNOSIS — Z41.9 ENCOUNTER FOR PROCEDURE FOR PURPOSES OTHER THAN REMEDYING HEALTH STATE, UNSPECIFIED: Chronic | ICD-10-CM

## 2022-09-23 PROCEDURE — 76536 US EXAM OF HEAD AND NECK: CPT

## 2022-09-23 PROCEDURE — 76536 US EXAM OF HEAD AND NECK: CPT | Mod: 26

## 2022-09-29 ENCOUNTER — NON-APPOINTMENT (OUTPATIENT)
Age: 87
End: 2022-09-29

## 2022-10-17 ENCOUNTER — RX RENEWAL (OUTPATIENT)
Age: 87
End: 2022-10-17

## 2022-10-17 ENCOUNTER — APPOINTMENT (OUTPATIENT)
Dept: UROGYNECOLOGY | Facility: CLINIC | Age: 87
End: 2022-10-17

## 2022-10-17 ENCOUNTER — NON-APPOINTMENT (OUTPATIENT)
Age: 87
End: 2022-10-17

## 2022-10-17 ENCOUNTER — APPOINTMENT (OUTPATIENT)
Dept: CARDIOLOGY | Facility: CLINIC | Age: 87
End: 2022-10-17

## 2022-10-17 VITALS
WEIGHT: 114 LBS | BODY MASS INDEX: 22.98 KG/M2 | SYSTOLIC BLOOD PRESSURE: 126 MMHG | OXYGEN SATURATION: 100 % | HEIGHT: 59 IN | DIASTOLIC BLOOD PRESSURE: 82 MMHG | HEART RATE: 70 BPM

## 2022-10-17 DIAGNOSIS — I49.49 OTHER PREMATURE DEPOLARIZATION: ICD-10-CM

## 2022-10-17 DIAGNOSIS — R10.2 PELVIC AND PERINEAL PAIN: ICD-10-CM

## 2022-10-17 PROCEDURE — 99215 OFFICE O/P EST HI 40 MIN: CPT

## 2022-10-17 PROCEDURE — 99213 OFFICE O/P EST LOW 20 MIN: CPT

## 2022-10-17 PROCEDURE — 93000 ELECTROCARDIOGRAM COMPLETE: CPT

## 2022-10-17 RX ORDER — OMEGA-3/DHA/EPA/FISH OIL 300-1000MG
1000 CAPSULE ORAL
Refills: 0 | Status: DISCONTINUED | COMMUNITY
End: 2022-10-17

## 2022-10-18 NOTE — HISTORY OF PRESENT ILLNESS
[FreeTextEntry1] : Love, 86 y/o female presents for follow up of pelvic prolapse and HANY. PT using Incontinence dish #5. She leaves it in for 4 days and removes it for 3 days. She is able to do self care and comfortable with it. She applies Replens with care and is also using Yuvafem 1/2 gram twice a week. Denies any bleeding, prolapse past the pessary, issues with urination or moving bowel.\par She reports that she has had left sided pelvic pain for the last few weeks. She states that she feels like the pessary is scraping her inside. She denies any pain today. She denies fever or chills.\par \par

## 2022-10-18 NOTE — PHYSICAL EXAM
[No Acute Distress] : in no acute distress [Well developed] : well developed [Well Nourished] : ~L well nourished [Good Hygeine] : demonstrates good hygeine [Oriented x3] : oriented to person, place, and time [Normal Memory] : ~T memory was ~L unimpaired [Normal Mood/Affect] : mood and affect are normal [LLQ] : in the left lower quadrant [No Mass] : no masses were palpated [Warm and Dry] : was warm and dry to touch [Normal Gait] : gait was normal [Vulvar Atrophy] : vulvar atrophy [Labia Majora] : were normal [Labia Minora] : were normal [Normal] : was normal [Atrophy] : atrophy [No Bleeding] : there was no active vaginal bleeding [Anxiety] : patient is not anxious [Tenderness] : ~T no ~M abdominal tenderness observed [Distended] : not distended [Inguinal LAD] : no adenopathy was noted in the inguinal lymph nodes

## 2022-10-18 NOTE — PROCEDURE
[Discharge] : there is vaginal discharge [Pessary Inserted] : inserted [Pessary Washed] : washed [H2O] : H2O [None] : no bleeding [Medication Review] : Medicaiton Review: Patient verbalizes understanding of risks and benefits [Good Fit] : fit is not good [Refit] : refit is not needed [Erosion] : no evidence of erosion [Erythema] : no erythema [Infection] : no evidence of infection [FreeTextEntry1] : Incontinence dish #5 [de-identified] : Incontinence dish #4 [de-identified] : scant yellow discharge [FreeTextEntry3] : Replens, Yuvafem [FreeTextEntry8] : Reinforced daily pericare.  Continue pessary self-care.

## 2022-10-18 NOTE — DISCUSSION/SUMMARY
[FreeTextEntry1] : Pelvic organ prolapse, HANY:\par -Refitted with Incontinence dish #4\par -Precautions and instructions were discussed.\par \par Atrophy:\par -Continue Yuvafem BIW\par -She may continue with Replens on alternating days. \par \par Pelvic pain:\par -No acute abdomen on exam\par -Pelvic US ordered\par -Call parameters reviewed\par \par -RTO in 2-3 weeks or sooner for follow up on pelvic prolapse.  IF pt have any problems/concern to call office.  PT aware and agrees.\par

## 2022-10-20 ENCOUNTER — APPOINTMENT (OUTPATIENT)
Dept: ULTRASOUND IMAGING | Facility: CLINIC | Age: 87
End: 2022-10-20

## 2022-10-20 ENCOUNTER — OUTPATIENT (OUTPATIENT)
Dept: OUTPATIENT SERVICES | Facility: HOSPITAL | Age: 87
LOS: 1 days | End: 2022-10-20
Payer: MEDICARE

## 2022-10-20 ENCOUNTER — RESULT REVIEW (OUTPATIENT)
Age: 87
End: 2022-10-20

## 2022-10-20 DIAGNOSIS — Z98.890 OTHER SPECIFIED POSTPROCEDURAL STATES: Chronic | ICD-10-CM

## 2022-10-20 DIAGNOSIS — Z41.9 ENCOUNTER FOR PROCEDURE FOR PURPOSES OTHER THAN REMEDYING HEALTH STATE, UNSPECIFIED: Chronic | ICD-10-CM

## 2022-10-20 DIAGNOSIS — R10.2 PELVIC AND PERINEAL PAIN: ICD-10-CM

## 2022-10-20 PROCEDURE — 76830 TRANSVAGINAL US NON-OB: CPT | Mod: 26

## 2022-10-20 PROCEDURE — 76856 US EXAM PELVIC COMPLETE: CPT | Mod: 26

## 2022-10-20 PROCEDURE — 76856 US EXAM PELVIC COMPLETE: CPT

## 2022-10-20 PROCEDURE — 76830 TRANSVAGINAL US NON-OB: CPT

## 2022-10-28 ENCOUNTER — APPOINTMENT (OUTPATIENT)
Dept: OTOLARYNGOLOGY | Facility: CLINIC | Age: 87
End: 2022-10-28

## 2022-10-28 VITALS — HEIGHT: 59 IN | TEMPERATURE: 97.3 F | WEIGHT: 116 LBS | BODY MASS INDEX: 23.39 KG/M2

## 2022-10-28 DIAGNOSIS — H91.90 UNSPECIFIED HEARING LOSS, UNSPECIFIED EAR: ICD-10-CM

## 2022-10-28 PROCEDURE — 31231 NASAL ENDOSCOPY DX: CPT

## 2022-10-28 PROCEDURE — 99214 OFFICE O/P EST MOD 30 MIN: CPT | Mod: 25

## 2022-10-28 NOTE — CONSULT LETTER
[FreeTextEntry1] : Dear Dr. DARYN LÓPEZ \par I had the pleasure of evaluating your patient ALPA BHATIA, thank you for allowing us to participate in their care. please see full note detailing our visit below.\par If you have any questions, please do not hesitate to call me and I would be happy to discuss further. \par \par Josue Anderson M.D.\par Attending Physician,  \par Department of Otolaryngology - Head and Neck Surgery\par [facility] \par Office: [phone]\par Fax: [phone]\par \par

## 2022-10-28 NOTE — REVIEW OF SYSTEMS
[Post Nasal Drip] : post nasal drip [Nasal Congestion] : nasal congestion [As Noted in HPI] : as noted in HPI [Hoarseness] : hoarseness [Negative] : Allergies [de-identified] : ear feels blocked [FreeTextEntry1] : when taking afrin on the second day, patient had allergic reaction. Face got puffy and hot to touch

## 2022-10-28 NOTE — ASSESSMENT
[FreeTextEntry1] : Ms. BHATIA is a 87 year female pt s/p L pansinus, right PACO 6/30/22, right scar band packed last visit, today scar band resolved and sinuses patent. Doing well post op, some yellow mucous with sinus rinse and hoarseness  but with resolved nasal congestion. No evidence of infection seen on scope today, + LPRD\par \par Patient was debrided bilaterally with rigid suction as a result of nasal crusting. breathing much improved after detriment and crusting much improved from last visit. healing well \par  \par - Continues saline irrigations 3x daily\par - continue  Flonase 2 sprays once a day\par - Avoid nose blowing or sneezing through the nose\par - Can consider office procedure to reduce turbinates for nasal congestion\par - Follow up 6 months\par \par pt also with continued PND and voice hoarseness, on exam with moderate acid reflux\par will proceed to start lifestyle regiment to reduce overproduction of acid and reduce laryngeal reflux including avoiding caffein, alcohol, eating before bed, spicy and fatty foods, and head elevation at night etc. Handout detailing regiment also given\par - Rx Omeprazole - will go down to 20mg daily\par - continue reflux precautions\par \par ETD - discussed treatment options\par at this point will do exeresis

## 2022-10-28 NOTE — HISTORY OF PRESENT ILLNESS
[de-identified] : 87 year old female s/p left sided pansinus balloon with right PACO 06/30/22\par States relief of snoring since procedure. \par Breathing continues to improve, no current nasal congestion.\par Reports post nasal drip and voice hoarseness \par Relief of sinus pain/ pressure.\par Still with occasional left ear pressure.\par Continues Flonase QD and saline irrigations 3 times daily.\par no vision changes, double vision, salty taste or clear D/c from the nose  [FreeTextEntry1] : pt doing sinus rinses 2x/day, she uses flonase daily, denies sinus pressure, no more yellow d/c\par feels she has PND and hoarseness  - does feel PPI helping \par taking Omeprazole daily\par getting some pressure in the left ear intermittent that clears when her ear pops, feels healing good when pops

## 2022-10-28 NOTE — PHYSICAL EXAM
[Nasal Endoscopy Performed] : nasal endoscopy was performed, see procedure section for findings [Normal] : inferior turbinates and middle turbinates are normal [de-identified] : nasal crusting much improved

## 2022-10-28 NOTE — PROCEDURE
[FreeTextEntry3] : Procedure performed: Nasal Endoscopy- Diagnostic\par Pre-op indication(s): nasal congestion\par Post-op indication(s): nasal congestion \par Verbal and/or written consent obtained from patient\par Anterior rhinoscopy insufficient to account for symptoms\par Scope #: 3,  flexible fiber optic telescope \par The scope was introduced in the nasal passage between the middle and inferior turbinates to exam the inferior portion of the middle meatus and the fontanelle, as well as the maxillary ostia.  Next, the scope was passed medically and posteriorly to the middle turbinates to examine the sphenoethmoid recess and the superior turbinate region.\par Upon visualization the finders are as follows:\par Nasal Septum: right septal deviation\par Bilateral - Mucosa: boggy turbinates, Mucous: scant, Polyp: not seen, Inferior Turbinate: boggy, Middle Turbinate: normal, Superior Turbinate: normal, Inferior Meatus: narrow, Middle Meatus: wide open on left - all sinuses clear Super Meatus:normal, Sphenoethmoidal Recess: clear\par  [de-identified] : Procedure performed: laryngeal Endoscopy- Diagnostic\par Pre-op/post op indication: dysphagia\par Verbal and/or written consent obtained from patient, Patient was unable to cooperate with mirror\par Scope #: 3, flexible fiber optic telescope used \par Scope was introduced through the nose passed on the floor of the nose to the nasopharynx and then followed down the soft palate to the lower pharynx. The tongue Base, Larynx, Hypopharynx were examined. Base of tongue was symmetric, vallecular was clear, epiglottis was not deformed, subglottis/ pyriform and posterior pharyngeal walls were clear. No erythema, edema, pooling of secretions, masses or lesions. Airway patent, no foreign body visualized. Postcricoid area with moderate erythema and mild edema. + intra-artenoid bar. No pooling of secretions. True vocal cords, vestibular folds, ventricles, pyriform sinuses, and aryepiglottic folds appear normal bilaterally. Vocal cords mobile with good contact b/l.\par .\par

## 2022-11-03 ENCOUNTER — NON-APPOINTMENT (OUTPATIENT)
Age: 87
End: 2022-11-03

## 2022-11-14 ENCOUNTER — RX RENEWAL (OUTPATIENT)
Age: 87
End: 2022-11-14

## 2022-11-14 ENCOUNTER — APPOINTMENT (OUTPATIENT)
Dept: UROGYNECOLOGY | Facility: CLINIC | Age: 87
End: 2022-11-14

## 2022-11-14 PROCEDURE — 99213 OFFICE O/P EST LOW 20 MIN: CPT

## 2022-11-14 NOTE — PROCEDURE
[Pessary Inserted] : inserted [Pessary Washed] : washed [H2O] : H2O [None] : no bleeding [Medication Review] : Medicaiton Review: Patient verbalizes understanding of risks and benefits [Good Fit] : fits well [Refit] : refit is not needed [Erosion] : no evidence of erosion [Erythema] : no erythema [Discharge] : no vaginal discharge [Infection] : no evidence of infection [FreeTextEntry1] : Incontinence dish #4 [FreeTextEntry3] : Replens, Yuvafem [FreeTextEntry8] : Reinforced daily pericare.  Continue pessary self-care.

## 2022-11-14 NOTE — DISCUSSION/SUMMARY
[FreeTextEntry1] : Pelvic organ prolapse, HANY:\par -Continue with Incontinence dish #4\par -Precautions and instructions were discussed.\par \par Atrophy:\par -Continue Yuvafem BIW\par -She may continue with Replens on alternating days. \par \par \par -RTO in 4 months or sooner for follow up on pelvic prolapse.  IF pt have any problems/concern to call office.  PT aware and agrees.\par

## 2022-11-14 NOTE — HISTORY OF PRESENT ILLNESS
[FreeTextEntry1] : Love, 86 y/o female presents for follow up of pelvic prolapse and HANY. Prolapse is supported with Incontinence dish #4. She leaves it in for 4 days and removes it for 3 days. She is able to do self care and comfortable with it. She applies Replens and is also using Yuvafem 1/2 gram twice a week. Denies any bleeding, prolapse past the pessary, issues with urination or moving her bowels.\par

## 2022-12-27 ENCOUNTER — NON-APPOINTMENT (OUTPATIENT)
Age: 87
End: 2022-12-27

## 2022-12-27 ENCOUNTER — APPOINTMENT (OUTPATIENT)
Dept: CARDIOLOGY | Facility: CLINIC | Age: 87
End: 2022-12-27

## 2022-12-27 VITALS
SYSTOLIC BLOOD PRESSURE: 142 MMHG | WEIGHT: 114 LBS | DIASTOLIC BLOOD PRESSURE: 79 MMHG | HEIGHT: 59 IN | OXYGEN SATURATION: 100 % | HEART RATE: 66 BPM | BODY MASS INDEX: 22.98 KG/M2

## 2022-12-27 DIAGNOSIS — R94.31 ABNORMAL ELECTROCARDIOGRAM [ECG] [EKG]: ICD-10-CM

## 2022-12-27 DIAGNOSIS — I65.29 OCCLUSION AND STENOSIS OF UNSPECIFIED CAROTID ARTERY: ICD-10-CM

## 2022-12-27 PROCEDURE — 93000 ELECTROCARDIOGRAM COMPLETE: CPT

## 2022-12-27 PROCEDURE — 99215 OFFICE O/P EST HI 40 MIN: CPT

## 2022-12-27 RX ORDER — OMEPRAZOLE 40 MG/1
40 CAPSULE, DELAYED RELEASE ORAL
Qty: 30 | Refills: 0 | Status: DISCONTINUED | COMMUNITY
Start: 2022-08-23 | End: 2022-12-27

## 2022-12-27 NOTE — REVIEW OF SYSTEMS
[Sinus Pressure] : sinus pressure [Negative] : Heme/Lymph [FreeTextEntry5] : See HPI [FreeTextEntry7] : See HPI

## 2022-12-27 NOTE — DISCUSSION/SUMMARY
[EKG obtained to assist in diagnosis and management of assessed problem(s)] : EKG obtained to assist in diagnosis and management of assessed problem(s) [FreeTextEntry1] : Over the past several years, the patient has infrequently awakened during the night with a "pounding" sensation in her chest associated with a feeling as if she cannot take in a full breath, with the symptoms spontaneously resolving within a few minutes.  She has experienced these episodes on an average of 1- 2 times per year, most recently in March 2022.  She has noted dyspnea on exertion in association with climbing stairs for several years now, without any recent change in the degree or pattern.  She does not report having experienced dyspnea on exertion or chest discomfort in association with her exercise activities, which she performs 3 times weekly.  Her cardiac examination today is remarkable for a faint systolic murmur heard throughout the precordium and infrequent premature beats, unchanged from her previous visit with me.  Her blood pressure reading today is normal.  Her electrocardiogram today reveals sinus rhythm with 1 atrial premature contraction, voltage criteria for LVH, a nonspecific RSR' pattern with poor R wave progression in leads V1-V2, and nonspecific repolarization abnormalities, essentially unchanged from her previous office tracing, with the exception of the atrial premature contraction exhibited on the present tracing, allowing for lead placement variation.\par \par The patient is exhibiting asymptomatic premature beats on examination, which I again reassured her today are most likely benign in nature.  I suspect that the patient's previous episodes of palpitations are benign in nature as well.  If these palpitations should become more frequent, more intense, or of longer duration, consideration will be given toward Zio patch event monitoring or implantation of an implantable loop recorder device for further evaluation. It is possible that the episodes of palpitations that awaken her from sleep may be triggered by obstructive sleep apnea, however, she states that she experiences similar symptoms while awake, specially when exposed to particularly hot and humid conditions.\par \par I have reviewed the findings of the nuclear stress study of 10/19/2021, the echocardiogram of 9/13/2021, and the carotid artery Doppler study of 9/13/2021 in detail with the patient today.\par \par I am referring the patient for a fasting lipid profile and chemistry screen to assess the efficacy of the present dosage of Crestor in optimizing the lipid profile, as well as to check for any potential adverse effects of statin therapy on the liver.\par \par I have asked the patient to call me if she should have any questions or problems pertaining to these matters, and especially if she should experience any concerning symptoms.  I have otherwise asked her to return to the office for follow-up cardiac evaluation in 6 months, provided she remains clinically stable in the interim.

## 2022-12-27 NOTE — PHYSICAL EXAM
[Well Developed] : well developed [No Acute Distress] : no acute distress [Normal Conjunctiva] : normal conjunctiva [No Carotid Bruit] : no carotid bruit [Normal S1, S2] : normal S1, S2 [No Rub] : no rub [No Gallop] : no gallop [Clear Lung Fields] : clear lung fields [No Respiratory Distress] : no respiratory distress  [Soft] : abdomen soft [Non Tender] : non-tender [Normal Gait] : normal gait [No Edema] : no edema [No Rash] : no rash [Moves all extremities] : moves all extremities [Alert and Oriented] : alert and oriented [de-identified] : No JVD is appreciated at a 45 degree angle [de-identified] : I/VI systolic murmur throughout precordium

## 2022-12-27 NOTE — CARDIOLOGY SUMMARY
[de-identified] : 12/27/22 -sinus rhythm at a rate of 67 bpm.  1 atrial premature contraction.  Nonspecific RSR' pattern in leads V1-V2.  Nonspecific poor R wave progression in leads V1-V2.  Voltage criteria for LVH.  Nonspecific repolarization abnormalities.

## 2022-12-27 NOTE — HISTORY OF PRESENT ILLNESS
[FreeTextEntry1] : Mrs. Love Pacheco presented to the office today for follow-up cardiac evaluation.  I initially evaluated the patient in the office on 9/1/2021 in cardiology consultation.  She was referred here at that time by Dr. Wang for further evaluation of electrocardiographic abnormalities, palpitations, and dyspnea.  I have also taken care of her .  I last evaluated the patient in the office on 10/17/2022.\par \par The patient is an 87-year-old female with a history of palpitations, dyspnea, mild aortic insufficiency, mild carotid atherosclerosis, a dyslipidemia, celiac disease, a hepatic cyst, a right kidney cyst, a cystocele, left thyroid nodules, osteoporosis, urinary stress incontinence (pessary), a uterine polyp, tonsillectomy, and a left femur stress hairline fracture (thought to be related to therapy with Fosamax or Prolia - status post ORIF 7/18/2018).\par \par The patient has been stable from a cardiac symptomatic standpoint since her previous visit here on 10/17/2022.  She reports that over the past few years, on 1 or 2 occasions per year, she has awakened during the night with a "pounding" sensation associated with a feeling as if she cannot take in a full breath, persisting for a few minutes before resolving spontaneously.  Since her previous visit her on 10/17/2022, she has not experienced recurrence of these episodes.  The patient has also noted dyspnea on exertion over the past several years in association with climbing stairs, without any recent change in the degree or pattern of the symptom.  Note that she exercises 3 times weekly, without experiencing significant dyspnea.  She has not experienced chest discomfort in association with her activities.  She has not noted orthopnea, paroxysmal nocturnal dyspnea, or lower extremity edema.  She has not experienced any episodes of presyncope or syncope.\par \par At the time of the patient's previous visit here on 10/17/2022, Crestor 5 mg daily was initiated for treatment of her dyslipidemia.\par \par Review of systems is significant for the patient having had a tooth extracted on 3/16/2022, and subsequently developing sinusitis, for which she consulted with an ENT specialist on 3/31/2022.  She was prescribed prednisone, Flonase inhaler, Afrin inhaler, and an antibiotic.  She has completed the course of prednisone.  She did not tolerate the Afrin inhaler.  She has remained on Flonase inhaler.  She underwent balloon sinuplasty on 6/30/2022.\par \par As far as risk factors for coronary artery disease are concerned, the patient has a dyslipidemia.  She does not have a history of diabetes or hypertension.  She denies a history of cigarette smoking.  She describes a family history of coronary disease, stating that her father suffered "a heart attack" in his 60's.\par \par Laboratory studies performed on 7/22/2022 revealed cholesterol 245, triglycerides 72, HDL 77, and calculated .  The liver chemistries were normal.  The BUN and creatinine were 9 and 0.67, respectively.  The potassium level was 4.4.  The glucose level was 80 and the hemoglobin A1c level was 5.5%.  The hemoglobin and hematocrit were 14.6 and 43.6, respectively.  The TSH level was 3.38.  The vitamin D level was 58.6.\par \par Exercise stress testing performed on 9/28/2021 was positive for electrocardiographic evidence of myocardial ischemia without the inducement of cardiac symptoms.  The patient was referred for nuclear stress testing for further evaluation.\par \par Nuclear stress testing performed on 10/19/2021 was negative for the inducement of cardiac symptoms, however, up to 1 mm of horizontal ST segment depression developed in the lateral leads.  The cardiac imaging portion of the study revealed normal left ventricular myocardial perfusion and systolic function.\par \par Echocardiography performed on 9/13/2021 revealed normal cardiac chamber sizes with normal left ventricular wall thickness and wall motion.  Left ventricular systolic function was normal, with an estimated ejection fraction of 60 to 65%.  Left ventricular diastolic function was normal.  Mild aortic regurgitation was demonstrated.  Mild tricuspid regurgitation was demonstrated, without evidence of pulmonary hypertension.\par \par Carotid artery Doppler testing performed on 9/13/2021 revealed mild plaque formation involving the right bulbar region and the proximal portion of the right internal carotid artery.

## 2022-12-30 LAB
ALBUMIN SERPL ELPH-MCNC: 4.1 G/DL
ALP BLD-CCNC: 102 U/L
ALT SERPL-CCNC: 19 U/L
ANION GAP SERPL CALC-SCNC: 9 MMOL/L
AST SERPL-CCNC: 25 U/L
BASOPHILS # BLD AUTO: 0.04 K/UL
BASOPHILS NFR BLD AUTO: 0.9 %
BILIRUB SERPL-MCNC: 0.4 MG/DL
BUN SERPL-MCNC: 11 MG/DL
CALCIUM SERPL-MCNC: 9.8 MG/DL
CHLORIDE SERPL-SCNC: 103 MMOL/L
CHOLEST SERPL-MCNC: 167 MG/DL
CO2 SERPL-SCNC: 29 MMOL/L
CREAT SERPL-MCNC: 0.64 MG/DL
EGFR: 86 ML/MIN/1.73M2
EOSINOPHIL # BLD AUTO: 0.21 K/UL
EOSINOPHIL NFR BLD AUTO: 4.7 %
GLUCOSE SERPL-MCNC: 79 MG/DL
HCT VFR BLD CALC: 42.7 %
HDLC SERPL-MCNC: 71 MG/DL
HGB BLD-MCNC: 13.8 G/DL
IMM GRANULOCYTES NFR BLD AUTO: 0.2 %
LDLC SERPL CALC-MCNC: 87 MG/DL
LYMPHOCYTES # BLD AUTO: 1.22 K/UL
LYMPHOCYTES NFR BLD AUTO: 27.5 %
MAN DIFF?: NORMAL
MCHC RBC-ENTMCNC: 31.7 PG
MCHC RBC-ENTMCNC: 32.3 GM/DL
MCV RBC AUTO: 98.2 FL
MONOCYTES # BLD AUTO: 0.68 K/UL
MONOCYTES NFR BLD AUTO: 15.3 %
NEUTROPHILS # BLD AUTO: 2.28 K/UL
NEUTROPHILS NFR BLD AUTO: 51.4 %
NONHDLC SERPL-MCNC: 97 MG/DL
PLATELET # BLD AUTO: 440 K/UL
POTASSIUM SERPL-SCNC: 4.3 MMOL/L
PROT SERPL-MCNC: 6.3 G/DL
RBC # BLD: 4.35 M/UL
RBC # FLD: 13.9 %
SODIUM SERPL-SCNC: 141 MMOL/L
TRIGL SERPL-MCNC: 48 MG/DL
TSH SERPL-ACNC: 4.01 UIU/ML
WBC # FLD AUTO: 4.44 K/UL

## 2023-03-07 ENCOUNTER — APPOINTMENT (OUTPATIENT)
Dept: ENDOCRINOLOGY | Facility: CLINIC | Age: 88
End: 2023-03-07
Payer: MEDICARE

## 2023-03-07 VITALS — DIASTOLIC BLOOD PRESSURE: 80 MMHG | HEART RATE: 63 BPM | SYSTOLIC BLOOD PRESSURE: 131 MMHG | OXYGEN SATURATION: 98 %

## 2023-03-07 PROCEDURE — 99214 OFFICE O/P EST MOD 30 MIN: CPT

## 2023-03-07 NOTE — ASSESSMENT
Patient is returning Allyne Males, NP  Phone call. Caller states that they were asked to call back. Patient states that it is okay to leave a detailed message. Preferred times to return call:    Contact Phone Number:   mobile [phone]       Patient was advised that the clinic would return their phone call with 24-48 hours. [FreeTextEntry1] : 87 year old female with past medical history of Osteoporosis, Celiac Disease who presents for management of her osteoporosis\par \par 1. Osteoporosis\par Patient warrants treatment for her OP given that she is at high risk for fractures. \par Diet, weight bearing and muscle strengthening exercise and fall prevention were discussed. Smoking cessation and alcohol consumption (no more then an average 2 drinks/day) was discussed. \par I counseled the patient regarding calcium and vitamin D intake. Calcium 1200 mg daily from diet and supplements (to be taken in divided doses as no more than 500-600 mg can be absorbed at one time)\par \par Patient is limited in her options given she cannot take bisphosphonates, Tymlos or Forteo\par She is concerned about Prolia but I do not suspect the Prolia played a role in her fracture\par Options are Prolia vs Evenity\par Discussed risks and benefits\par \par Patient will likely restart Prolia but needs to do some dental procedures first\par \par 2. Thyroid Nodule\par Explained thyroid nodules and the risk of cancer, FNA and management\par Nodules are not suspicious\par TFTs stable\par \par Follow up in 6 months

## 2023-03-07 NOTE — HISTORY OF PRESENT ILLNESS
[FreeTextEntry1] : Ms. ALPA BHATIA  is a 87 year old female with past medical history of Osteoporosis, Celiac Disease who presents for management of her osteoporosis. \par \par Bone History:\par Menopause: Last menstrual period 50s\par Osteoporosis diagnosed in 2000s\par Fracture history: L Femur fx (2018) (hairline fracture, ortho suspected it was atypical)\par Family history: Brother osteoporosis\par Treatment: Fosamax, Forteo (inj site reaction), Prolia ([id number]), Tymlos (intolerance)\par \par Falls: No\par Height loss: yes\par Kidney stones: No\par Dental health: Regular appointments, no history of implants, no upcoming procedures planned\par Exercise: none\par Dairy intake: yes\par Calcium supplements: yes\par Multivitamin: None\par Vitamin D supplements: Vitamin D3 \par \par Osteoporosis risk factors include: Postmenopausal status,  race, prior fracture, height loss, family history\par \par \par Re Thyroid Nodules\par Found incidentally. No history of thyroid disease. No significant family history.

## 2023-03-13 ENCOUNTER — RX RENEWAL (OUTPATIENT)
Age: 88
End: 2023-03-13

## 2023-03-20 ENCOUNTER — APPOINTMENT (OUTPATIENT)
Dept: UROGYNECOLOGY | Facility: CLINIC | Age: 88
End: 2023-03-20
Payer: MEDICARE

## 2023-03-20 PROCEDURE — 99213 OFFICE O/P EST LOW 20 MIN: CPT

## 2023-03-21 NOTE — PROCEDURE
[Good Fit] : fits well [Discharge] : there is vaginal discharge [Pessary Inserted] : inserted [Pessary Washed] : washed [H2O] : H2O [None] : no bleeding [Medication Review] : Medicaiton Review: Patient verbalizes understanding of risks and benefits [Refit] : refit is not needed [Erosion] : no evidence of erosion [Erythema] : no erythema [Infection] : no evidence of infection [FreeTextEntry1] : Incontinence dish #4 [de-identified] : scant yellow discharge [FreeTextEntry3] : Replens, Yuvafem [FreeTextEntry8] : Reinforced daily pericare.  Continue pessary self-care.

## 2023-03-21 NOTE — PHYSICAL EXAM
[No Acute Distress] : in no acute distress [Well developed] : well developed [Well Nourished] : ~L well nourished [Good Hygeine] : demonstrates good hygeine [Oriented x3] : oriented to person, place, and time [Normal Memory] : ~T memory was ~L unimpaired [Normal Mood/Affect] : mood and affect are normal [LLQ] : in the left lower quadrant [No Mass] : no masses were palpated [Warm and Dry] : was warm and dry to touch [Normal Gait] : gait was normal [Vulvar Atrophy] : vulvar atrophy [Labia Majora] : were normal [Labia Minora] : were normal [Normal] : was normal [Atrophy] : atrophy [Discharge] : a  ~M vaginal discharge was present [Scant] : scant [Jo Ann] : yellow [Thin] : thin [No Bleeding] : there was no active vaginal bleeding [Anxiety] : patient is not anxious [Tenderness] : ~T no ~M abdominal tenderness observed [Distended] : not distended [Inguinal LAD] : no adenopathy was noted in the inguinal lymph nodes

## 2023-03-21 NOTE — DISCUSSION/SUMMARY
[FreeTextEntry1] : Pelvic organ prolapse, HANY:\par -Continue with Incontinence dish #4\par -Precautions and instructions were discussed.\par \par Atrophy:\par -Continue Yuvafem BIW\par -She may continue with Replens on alternating days. \par \par \par -RTO in 3 months or sooner for follow up on pelvic prolapse.  IF pt have any problems/concern to call office.  PT aware and agrees.\par

## 2023-04-28 ENCOUNTER — APPOINTMENT (OUTPATIENT)
Dept: OTOLARYNGOLOGY | Facility: CLINIC | Age: 88
End: 2023-04-28
Payer: MEDICARE

## 2023-04-28 VITALS — HEIGHT: 59 IN | WEIGHT: 116 LBS | BODY MASS INDEX: 23.39 KG/M2 | TEMPERATURE: 97.6 F

## 2023-04-28 DIAGNOSIS — R49.0 DYSPHONIA: ICD-10-CM

## 2023-04-28 PROCEDURE — 31231 NASAL ENDOSCOPY DX: CPT

## 2023-04-28 PROCEDURE — 99214 OFFICE O/P EST MOD 30 MIN: CPT | Mod: 25

## 2023-04-28 RX ORDER — ESOMEPRAZOLE MAGNESIUM 20 MG/1
20 CAPSULE, DELAYED RELEASE ORAL DAILY
Qty: 90 | Refills: 0 | Status: DISCONTINUED | COMMUNITY
Start: 2022-10-28 | End: 2023-04-28

## 2023-04-28 NOTE — HISTORY OF PRESENT ILLNESS
[de-identified] : 87 year old female s/p left sided pansinus balloon with right PACO 06/30/22\par States relief of snoring since procedure. \par Breathing continues to improve, no current nasal congestion.\par Reports post nasal drip and voice hoarseness \par Relief of sinus pain/ pressure.\par Still with occasional left ear pressure.\par Continues Flonase QD and saline irrigations 3 times daily.\par no vision changes, double vision, salty taste or clear D/c from the nose \par \par pt doing sinus rinses 2x/day, she uses flonase daily, denies sinus pressure, no more yellow d/c\par feels she has PND and hoarseness  - does feel PPI helping \par taking Omeprazole daily\par getting some pressure in the left ear intermittent that clears when her ear pops, feels healing good when pops [FreeTextEntry1] : Patient following up  s/p left sided pansinus balloon with right PACO 06/30/22 with nasal congestion. States right side feels blocked and left side of nose feels like theres mucus in it. Doing sinus rinses 1-2x a day. Using flonase everyday. States one month ago had yellow discharge from nostrils but it resolved. Still has post nasal drip. Denies sinus pressure.\par \par Patient states voice is still hoarse. States it worsens when post nasal drip increases. Ran out of esoomeprazole 1 month ago, can feel that now there is acid again.

## 2023-04-28 NOTE — ASSESSMENT
[FreeTextEntry1] : Ms. BHATIA is a 87 year female pt s/p L pansinus, right PACO 6/30/22, right scar band packed last visit, today scar band resolved and sinuses patent. Doing well post op. No evidence of infection seen on scope today. \par - Continues saline irrigations 3x daily\par - continue Flonase 2 sprays once a day\par - Can consider office procedure to reduce turbinates for nasal congestion\par \par Pt also with continued PND and voice hoarseness, on exam with moderate acid reflux\par will proceed to start lifestyle regiment to reduce overproduction of acid and reduce laryngeal reflux including avoiding caffein, alcohol, eating before bed, spicy and fatty foods, and head elevation at night etc. Handout detailing regiment also given\par - continue reflux precautions\par - will start pt on Pepcid \par \par

## 2023-04-28 NOTE — REVIEW OF SYSTEMS
[Post Nasal Drip] : post nasal drip [As Noted in HPI] : as noted in HPI [Nasal Congestion] : nasal congestion [Hoarseness] : hoarseness [Negative] : Allergies [de-identified] : ear feels blocked [de-identified] : nasal nostril  clogged [FreeTextEntry1] : when taking afrin on the second day, patient had allergic reaction. Face got puffy and hot to touch

## 2023-04-28 NOTE — PROCEDURE

## 2023-04-28 NOTE — CONSULT LETTER
[Please see my note below.] : Please see my note below. [FreeTextEntry1] : Dear Dr. DARYN LÓPEZ \par I had the pleasure of evaluating your patient ALPA BHATIA, thank you for allowing us to participate in their care. please see full note detailing our visit below.\par If you have any questions, please do not hesitate to call me and I would be happy to discuss further. \par \par Josue Anderson M.D.\par Attending Physician,  \par Department of Otolaryngology - Head and Neck Surgery\par [facility] \par Office: [phone]\par Fax: [phone]\par \par

## 2023-05-05 ENCOUNTER — APPOINTMENT (OUTPATIENT)
Dept: OTOLARYNGOLOGY | Facility: CLINIC | Age: 88
End: 2023-05-05
Payer: MEDICARE

## 2023-05-05 VITALS — HEIGHT: 59 IN | WEIGHT: 116 LBS | TEMPERATURE: 97.3 F | BODY MASS INDEX: 23.39 KG/M2

## 2023-05-05 PROCEDURE — 99214 OFFICE O/P EST MOD 30 MIN: CPT | Mod: 25

## 2023-05-05 PROCEDURE — 31231 NASAL ENDOSCOPY DX: CPT

## 2023-05-05 NOTE — ASSESSMENT
[FreeTextEntry1] : Ms. BHATIA is a 87 year female pt s/p L pansinus, right PACO 6/30/22, right scar band packed last visit,\par Doing well post op. sinuses open today and no evidence of infection seen on scope today. \par - Continues saline irrigations 3x daily\par - continue Flonase 2 sprays once a day\par \par Pt also with continued PND and voice hoarseness, on exam with moderate acid reflux\par continue lifestyle regiment to reduce overproduction of acid and reduce laryngeal reflux including avoiding caffein, alcohol, eating before bed, spicy and fatty foods, and head elevation at night etc. Handout detailing regiment also given\par - continue precautions\par - discussed swallow study if sx persist \par - will start pt on Pepcid complete \par - can follow up with GI\par \par \par

## 2023-05-05 NOTE — REVIEW OF SYSTEMS
[Post Nasal Drip] : post nasal drip [As Noted in HPI] : as noted in HPI [Nasal Congestion] : nasal congestion [Hoarseness] : hoarseness [Negative] : Heme/Lymph [de-identified] : ear feels blocked [de-identified] : nasal nostril  clogged [FreeTextEntry1] : when taking afrin on the second day, patient had allergic reaction. Face got puffy and hot to touch

## 2023-05-05 NOTE — HISTORY OF PRESENT ILLNESS
[de-identified] : 87 year old female s/p left sided pansinus balloon with right PACO 06/30/22\par States relief of snoring since procedure. \par Breathing continues to improve, no current nasal congestion.\par Reports post nasal drip and voice hoarseness \par Relief of sinus pain/ pressure.\par Still with occasional left ear pressure.\par Continues Flonase QD and saline irrigations 3 times daily.\par no vision changes, double vision, salty taste or clear D/c from the nose \par \par pt doing sinus rinses 2x/day, she uses flonase daily, denies sinus pressure, no more yellow d/c\par feels she has PND and hoarseness  - does feel PPI helping \par taking Omeprazole daily\par getting some pressure in the left ear intermittent that clears when her ear pops, feels healing good when pops\par \par Patient following up  s/p left sided pansinus balloon with right PACO 06/30/22 with nasal congestion. States right side feels blocked and left side of nose feels like theres mucus in it. Doing sinus rinses 1-2x a day. Using flonase everyday. States one month ago had yellow discharge from nostrils but it resolved. Still has post nasal drip. Denies sinus pressure.\par \par Patient states voice is still hoarse. States it worsens when post nasal drip increases. Ran out of esoomeprazole 1 month ago, can feel that now there is acid again.  [FreeTextEntry1] : Patient following up  s/p left sided pansinus balloon with right PACO 06/30/22 with nasal congestion. Breathing is better after cleanout last visit, still having difficulty breathing from left side. Doing sinus rinses 1-2x a day. Using flonase everyday. Still has post nasal drip. Denies sinus pressure.\par \par Patient states voice is still hoarse and is now having difficulty swallowing. States it worsens when post nasal drip increases. Ran out of esomeprazole 1 month ago, can feel that now there is acid again. Has been taking omeprazole and generic pepcid some days but is having problems swallowing the pills. She is putting medication in apple sauce. Patient feels like food is getting stuck in her throat and feels mucus in throat. States she is also coughing, and has dry mouth. Denies difficulty breathing. \par This morning had breakfast and multivitamin and felt it stick in throat. Drank entire bottle of water to try to get it down, it did not help. Felt it dissolve throughout day.

## 2023-05-05 NOTE — CONSULT LETTER
[Please see my note below.] : Please see my note below. [FreeTextEntry1] : Dear Dr. DARYN LÓPEZ \par I had the pleasure of evaluating your patient ALPA BHATIA, thank you for allowing us to participate in their care. please see full note detailing our visit below.\par If you have any questions, please do not hesitate to call me and I would be happy to discuss further. \par \par Josue Anderson M.D.\par Attending Physician,  \par Department of Otolaryngology - Head and Neck Surgery\par [facility] \par Office: [phone]\par Fax: [phone]\par \par

## 2023-05-05 NOTE — END OF VISIT
[FreeTextEntry3] : I personally saw and examined  the patient in detail.  I spoke to MEG Archer regarding the assessment and plan of care. I performed the procedures and relevant physical exam.  I have reviewed the above assessment and plan of care and I agree.  I have made changes to the body of the note wherever necessary and appropriate\par

## 2023-05-05 NOTE — PROCEDURE

## 2023-05-09 ENCOUNTER — APPOINTMENT (OUTPATIENT)
Dept: INTERNAL MEDICINE | Facility: CLINIC | Age: 88
End: 2023-05-09

## 2023-05-10 ENCOUNTER — NON-APPOINTMENT (OUTPATIENT)
Age: 88
End: 2023-05-10

## 2023-05-10 ENCOUNTER — APPOINTMENT (OUTPATIENT)
Dept: INTERNAL MEDICINE | Facility: CLINIC | Age: 88
End: 2023-05-10
Payer: MEDICARE

## 2023-05-10 VITALS
WEIGHT: 112 LBS | TEMPERATURE: 97.8 F | HEIGHT: 59 IN | HEART RATE: 68 BPM | BODY MASS INDEX: 22.58 KG/M2 | RESPIRATION RATE: 16 BRPM | DIASTOLIC BLOOD PRESSURE: 54 MMHG | SYSTOLIC BLOOD PRESSURE: 112 MMHG | OXYGEN SATURATION: 93 %

## 2023-05-10 DIAGNOSIS — R53.83 OTHER FATIGUE: ICD-10-CM

## 2023-05-10 DIAGNOSIS — R94.31 ABNORMAL ELECTROCARDIOGRAM [ECG] [EKG]: ICD-10-CM

## 2023-05-10 PROCEDURE — 99214 OFFICE O/P EST MOD 30 MIN: CPT

## 2023-05-10 NOTE — HEALTH RISK ASSESSMENT
[0] : 2) Feeling down, depressed, or hopeless: Not at all (0) [PHQ-2 Negative - No further assessment needed] : PHQ-2 Negative - No further assessment needed [Never] : Never [No] : No [[id number]] : 0

## 2023-05-10 NOTE — HISTORY OF PRESENT ILLNESS
[FreeTextEntry1] : not feeling well [de-identified] : States that she has not been feeling well. Was having trouble swallowing pills and saw ENT. Now going to GI.\par \par c/o chills, dry mouth, cough which feels like it is in her throat from post nasal drip. Has a sour stomach. Had chest discomfort. Denies sob. Taking esomprazole. She exercises several times a week and feels fine. Last chest discomfort was a few days ago.

## 2023-05-11 LAB
ALBUMIN SERPL ELPH-MCNC: 4.4 G/DL
ALP BLD-CCNC: 59 U/L
ALT SERPL-CCNC: 15 U/L
ANION GAP SERPL CALC-SCNC: 14 MMOL/L
AST SERPL-CCNC: 25 U/L
BASOPHILS # BLD AUTO: 0.04 K/UL
BASOPHILS NFR BLD AUTO: 0.8 %
BILIRUB SERPL-MCNC: 0.4 MG/DL
BUN SERPL-MCNC: 8 MG/DL
CALCIUM SERPL-MCNC: 10.1 MG/DL
CHLORIDE SERPL-SCNC: 93 MMOL/L
CO2 SERPL-SCNC: 27 MMOL/L
CREAT SERPL-MCNC: 0.6 MG/DL
EGFR: 87 ML/MIN/1.73M2
EOSINOPHIL # BLD AUTO: 0.06 K/UL
EOSINOPHIL NFR BLD AUTO: 1.1 %
ESTIMATED AVERAGE GLUCOSE: 108 MG/DL
FOLATE SERPL-MCNC: >20 NG/ML
GLUCOSE SERPL-MCNC: 111 MG/DL
HBA1C MFR BLD HPLC: 5.4 %
HCT VFR BLD CALC: 41.2 %
HGB BLD-MCNC: 14.3 G/DL
IMM GRANULOCYTES NFR BLD AUTO: 0.2 %
LYMPHOCYTES # BLD AUTO: 1.09 K/UL
LYMPHOCYTES NFR BLD AUTO: 20.9 %
MAN DIFF?: NORMAL
MCHC RBC-ENTMCNC: 32.4 PG
MCHC RBC-ENTMCNC: 34.7 GM/DL
MCV RBC AUTO: 93.4 FL
MONOCYTES # BLD AUTO: 0.71 K/UL
MONOCYTES NFR BLD AUTO: 13.6 %
NEUTROPHILS # BLD AUTO: 3.31 K/UL
NEUTROPHILS NFR BLD AUTO: 63.4 %
PLATELET # BLD AUTO: 343 K/UL
POTASSIUM SERPL-SCNC: 4 MMOL/L
PROT SERPL-MCNC: 6.6 G/DL
RBC # BLD: 4.41 M/UL
RBC # FLD: 12.7 %
SODIUM SERPL-SCNC: 133 MMOL/L
TSH SERPL-ACNC: 2.46 UIU/ML
VIT B12 SERPL-MCNC: 1258 PG/ML
WBC # FLD AUTO: 5.22 K/UL

## 2023-05-12 ENCOUNTER — EMERGENCY (EMERGENCY)
Facility: HOSPITAL | Age: 88
LOS: 1 days | Discharge: HOME | End: 2023-05-12
Attending: EMERGENCY MEDICINE | Admitting: EMERGENCY MEDICINE
Payer: MEDICARE

## 2023-05-12 VITALS
DIASTOLIC BLOOD PRESSURE: 59 MMHG | SYSTOLIC BLOOD PRESSURE: 112 MMHG | OXYGEN SATURATION: 99 % | HEART RATE: 67 BPM | RESPIRATION RATE: 16 BRPM

## 2023-05-12 VITALS
HEIGHT: 59 IN | RESPIRATION RATE: 20 BRPM | SYSTOLIC BLOOD PRESSURE: 169 MMHG | HEART RATE: 85 BPM | OXYGEN SATURATION: 100 % | TEMPERATURE: 98 F | WEIGHT: 113.98 LBS | DIASTOLIC BLOOD PRESSURE: 80 MMHG

## 2023-05-12 DIAGNOSIS — Z98.890 OTHER SPECIFIED POSTPROCEDURAL STATES: Chronic | ICD-10-CM

## 2023-05-12 DIAGNOSIS — Z41.9 ENCOUNTER FOR PROCEDURE FOR PURPOSES OTHER THAN REMEDYING HEALTH STATE, UNSPECIFIED: Chronic | ICD-10-CM

## 2023-05-12 LAB
ALBUMIN SERPL ELPH-MCNC: 3.6 G/DL — SIGNIFICANT CHANGE UP (ref 3.3–5)
ALP SERPL-CCNC: 54 U/L — SIGNIFICANT CHANGE UP (ref 40–120)
ALT FLD-CCNC: 23 U/L — SIGNIFICANT CHANGE UP (ref 12–78)
ANION GAP SERPL CALC-SCNC: 7 MMOL/L — SIGNIFICANT CHANGE UP (ref 5–17)
AST SERPL-CCNC: 26 U/L — SIGNIFICANT CHANGE UP (ref 15–37)
BASOPHILS # BLD AUTO: 0.05 K/UL — SIGNIFICANT CHANGE UP (ref 0–0.2)
BASOPHILS NFR BLD AUTO: 1 % — SIGNIFICANT CHANGE UP (ref 0–2)
BILIRUB SERPL-MCNC: 0.6 MG/DL — SIGNIFICANT CHANGE UP (ref 0.2–1.2)
BUN SERPL-MCNC: 12 MG/DL — SIGNIFICANT CHANGE UP (ref 7–23)
CALCIUM SERPL-MCNC: 9.5 MG/DL — SIGNIFICANT CHANGE UP (ref 8.5–10.1)
CHLORIDE SERPL-SCNC: 98 MMOL/L — SIGNIFICANT CHANGE UP (ref 96–108)
CO2 SERPL-SCNC: 26 MMOL/L — SIGNIFICANT CHANGE UP (ref 22–31)
CREAT SERPL-MCNC: 0.65 MG/DL — SIGNIFICANT CHANGE UP (ref 0.5–1.3)
EGFR: 86 ML/MIN/1.73M2 — SIGNIFICANT CHANGE UP
EOSINOPHIL # BLD AUTO: 0.12 K/UL — SIGNIFICANT CHANGE UP (ref 0–0.5)
EOSINOPHIL NFR BLD AUTO: 2.4 % — SIGNIFICANT CHANGE UP (ref 0–6)
GLUCOSE SERPL-MCNC: 80 MG/DL — SIGNIFICANT CHANGE UP (ref 70–99)
HCT VFR BLD CALC: 41.4 % — SIGNIFICANT CHANGE UP (ref 34.5–45)
HGB BLD-MCNC: 13.8 G/DL — SIGNIFICANT CHANGE UP (ref 11.5–15.5)
IMM GRANULOCYTES NFR BLD AUTO: 0 % — SIGNIFICANT CHANGE UP (ref 0–0.9)
LIDOCAIN IGE QN: 457 U/L — HIGH (ref 73–393)
LYMPHOCYTES # BLD AUTO: 1.52 K/UL — SIGNIFICANT CHANGE UP (ref 1–3.3)
LYMPHOCYTES # BLD AUTO: 30.5 % — SIGNIFICANT CHANGE UP (ref 13–44)
MAGNESIUM SERPL-MCNC: 2.1 MG/DL — SIGNIFICANT CHANGE UP (ref 1.6–2.6)
MCHC RBC-ENTMCNC: 30.6 PG — SIGNIFICANT CHANGE UP (ref 27–34)
MCHC RBC-ENTMCNC: 33.3 GM/DL — SIGNIFICANT CHANGE UP (ref 32–36)
MCV RBC AUTO: 91.8 FL — SIGNIFICANT CHANGE UP (ref 80–100)
MONOCYTES # BLD AUTO: 0.79 K/UL — SIGNIFICANT CHANGE UP (ref 0–0.9)
MONOCYTES NFR BLD AUTO: 15.8 % — HIGH (ref 2–14)
NEUTROPHILS # BLD AUTO: 2.51 K/UL — SIGNIFICANT CHANGE UP (ref 1.8–7.4)
NEUTROPHILS NFR BLD AUTO: 50.3 % — SIGNIFICANT CHANGE UP (ref 43–77)
NRBC # BLD: 0 /100 WBCS — SIGNIFICANT CHANGE UP (ref 0–0)
PLATELET # BLD AUTO: 333 K/UL — SIGNIFICANT CHANGE UP (ref 150–400)
POTASSIUM SERPL-MCNC: 3.9 MMOL/L — SIGNIFICANT CHANGE UP (ref 3.5–5.3)
POTASSIUM SERPL-SCNC: 3.9 MMOL/L — SIGNIFICANT CHANGE UP (ref 3.5–5.3)
PROT SERPL-MCNC: 6.8 G/DL — SIGNIFICANT CHANGE UP (ref 6–8.3)
RBC # BLD: 4.51 M/UL — SIGNIFICANT CHANGE UP (ref 3.8–5.2)
RBC # FLD: 12.5 % — SIGNIFICANT CHANGE UP (ref 10.3–14.5)
SODIUM SERPL-SCNC: 131 MMOL/L — LOW (ref 135–145)
TROPONIN I, HIGH SENSITIVITY RESULT: 11.4 NG/L — SIGNIFICANT CHANGE UP
TROPONIN I, HIGH SENSITIVITY RESULT: 14.6 NG/L — SIGNIFICANT CHANGE UP
WBC # BLD: 4.99 K/UL — SIGNIFICANT CHANGE UP (ref 3.8–10.5)
WBC # FLD AUTO: 4.99 K/UL — SIGNIFICANT CHANGE UP (ref 3.8–10.5)

## 2023-05-12 PROCEDURE — 99285 EMERGENCY DEPT VISIT HI MDM: CPT

## 2023-05-12 PROCEDURE — 71045 X-RAY EXAM CHEST 1 VIEW: CPT

## 2023-05-12 PROCEDURE — 80053 COMPREHEN METABOLIC PANEL: CPT

## 2023-05-12 PROCEDURE — 93010 ELECTROCARDIOGRAM REPORT: CPT

## 2023-05-12 PROCEDURE — 85025 COMPLETE CBC W/AUTO DIFF WBC: CPT

## 2023-05-12 PROCEDURE — 83735 ASSAY OF MAGNESIUM: CPT

## 2023-05-12 PROCEDURE — 83690 ASSAY OF LIPASE: CPT

## 2023-05-12 PROCEDURE — 99285 EMERGENCY DEPT VISIT HI MDM: CPT | Mod: 25

## 2023-05-12 PROCEDURE — 84484 ASSAY OF TROPONIN QUANT: CPT

## 2023-05-12 PROCEDURE — 93005 ELECTROCARDIOGRAM TRACING: CPT

## 2023-05-12 PROCEDURE — 36415 COLL VENOUS BLD VENIPUNCTURE: CPT

## 2023-05-12 PROCEDURE — 71045 X-RAY EXAM CHEST 1 VIEW: CPT | Mod: 26

## 2023-05-12 RX ORDER — ASPIRIN/CALCIUM CARB/MAGNESIUM 324 MG
324 TABLET ORAL ONCE
Refills: 0 | Status: COMPLETED | OUTPATIENT
Start: 2023-05-12 | End: 2023-05-12

## 2023-05-12 RX ADMIN — Medication 324 MILLIGRAM(S): at 04:22

## 2023-05-12 NOTE — ED ADULT NURSE NOTE - NSICDXPASTSURGICALHX_GEN_ALL_CORE_FT
PAST SURGICAL HISTORY:  Elective surgery pessary placed  as outpatient  2016    H/O major orthopedic surgery kell placed due to fx

## 2023-05-12 NOTE — ED PROVIDER NOTE - PATIENT PORTAL LINK FT
You can access the FollowMyHealth Patient Portal offered by [facility] by registering at the following website: [email or web address]. By joining [employer]’s FollowMyHealth portal, you will also be able to view your health information using other applications (apps) compatible with our system.

## 2023-05-12 NOTE — ED ADULT TRIAGE NOTE - CHIEF COMPLAINT QUOTE
Pt [place] c/o "pounding heart" started around midnight today. Pt also c/o intermittent left shoulder pain started yesterday. Denies trauma/injury. Denies SOB, dizziness, weakness.

## 2023-05-12 NOTE — ED ADULT NURSE NOTE - CHIEF COMPLAINT QUOTE
Pt [place] c/o "pounding heart" started around midnight today. Pt also c/o intermittent left shoulder pain started yesterday. Denies trauma/injury. Denies SOB, dizziness, weakness.

## 2023-05-12 NOTE — ED ADULT NURSE REASSESSMENT NOTE - NSFALLHARMRISKINTERV_ED_ALL_ED

## 2023-05-12 NOTE — ED ADULT NURSE NOTE - NSFALLUNIVINTERV_ED_ALL_ED
Bed/Stretcher in lowest position, wheels locked, appropriate side rails in place/Call bell, personal items and telephone in reach/Instruct patient to call for assistance before getting out of bed/chair/stretcher/Non-slip footwear applied when patient is off stretcher/[place] to call system/Physically safe environment - no spills, clutter or unnecessary equipment/Purposeful proactive rounding/Room/bathroom lighting operational, light cord in reach

## 2023-05-12 NOTE — ED PROVIDER NOTE - OBJECTIVE STATEMENT
87-year-old female with history of recently diagnosed GERD, celiac disease, hyperlipidemia who presents complaining of a pounding sensation in her chest feels that her heartbeat is abnormal and irregular.  Patient also reports she felt some chest pressure cannot recall if it was if associated with the palpitations that she describes.  Patient also reports she has had some mild discomfort in the chest and between the shoulder blades earlier in the day.  Denies any history of CAD or cardiac stents.  Patient reports Dr. Christian is her cardiologist she sees him every 6 months due to her family history of CAD and MI.  Denies any chest pain at this time.  Patient reports she is currently on a PPI and Pepcid for her GERD symptoms.

## 2023-05-12 NOTE — ED ADULT TRIAGE NOTE - BP NONINVASIVE SYSTOLIC (MM HG)
months.     Jaren Barrow MD  Otolaryngologist, Laryngologist   Department of Otolaryngology Head & Neck Surgery   [phone]

## 2023-05-12 NOTE — ED ADULT NURSE REASSESSMENT NOTE - NS ED NURSE REASSESS COMMENT FT1
report received from previous shift RN. pt a&o x3, laying comfortably in stretcher, VSS, reports CP improved, now 0-1 on 10 pain scale. pt denies any new/worsening complaints. no acute distress noted. comfort/safety maintained. will continue to monitor. pending cardio consult.

## 2023-05-12 NOTE — ED PROVIDER NOTE - CARE PROVIDER_API CALL
Bear Vila)  Internal Medicine  [address] [place] [address]  Phone: [phone]  Fax: [phone]  Follow Up Time: 1-3 Days

## 2023-05-12 NOTE — ED PROVIDER NOTE - CLINICAL SUMMARY MEDICAL DECISION MAKING FREE TEXT BOX
87-year-old female with history as mentioned above presenting with palpitations and chest pressure.  EKG does not show any ST elevations, gestures of premature atrial beats.  Patient is in no distress.  Lungs are clear auscultation.  Heart sounds are irregularly irregular.  Abdomen is soft nontender.  Will evaluate for ACS, electrolyte abnormality, dysrhythmia.  We will get labs, EKG, chest x-ray.  Will get troponin x2.  Will have cardiology see patient in the morning.  Will give aspirin 324.  Will continue cardiac monitoring.  Will reassess in the morning.

## 2023-05-12 NOTE — ED PROVIDER NOTE - PROGRESS NOTE DETAILS
kristen (lilia) seen eval pt, cleared for d/c and outpt f/u. Reevaluated patient at bedside.  Patient feeling well.  Discussed the results of all diagnostic testing in ED and copies of all reports given.   An opportunity to ask questions was given.  Discussed the importance of prompt, close medical follow-up.  Patient will return with any changes, concerns or persistent / worsening symptoms.  Understanding of all instructions verbalized.

## 2023-05-12 NOTE — ED ADULT NURSE NOTE - OBJECTIVE STATEMENT
Pt to ED c/c "pounding" heart, pt states she had left shoulder pain yesterday and awoke today with palpitations. Skin is w/d, resps nonlabored, bbs cta. Pt is noted with frequent PAC's. Pending disposition

## 2023-05-12 NOTE — ED PROVIDER NOTE - NSFOLLOWUPINSTRUCTIONS_ED_ALL_ED_FT
Benzoyl Peroxide Counseling: Patient counseled that medicine may cause skin irritation and bleach clothing. In the event of skin irritation, the patient was advised to reduce the amount of the drug applied or use it less frequently. The patient verbalized understanding of the proper use and possible adverse effects of benzoyl peroxide. All of the patient's questions and concerns were addressed. CHEST PAIN - AfterCare(R) Instructions(ER/ED)    Chest Pain    WHAT YOU NEED TO KNOW:    Chest pain can be caused by a range of conditions, from not serious to life-threatening. Chest pain can be a symptom of a digestive problem, such as acid reflux or a stomach ulcer. An anxiety attack or a strong emotion, such as anger, can also cause chest pain. Infection, inflammation, or a fracture in the bones or cartilage in your chest can cause pain or discomfort. Sometimes chest pain or pressure is caused by poor blood flow to your heart (angina). Chest pain may also be caused by life-threatening conditions such as a heart attack or blood clot in your lungs.    DISCHARGE INSTRUCTIONS:    Call your local emergency number (911 in the [place]) or have someone call if:    You have any of the following signs of a heart attack:  Squeezing, pressure, or pain in your chest    You may also have any of the following:  Discomfort or pain in your back, neck, jaw, stomach, or arm    Shortness of breath    Nausea or vomiting    Lightheadedness or a sudden cold sweat    Return to the emergency department if:    You have chest discomfort that gets worse, even with medicine.    You cough or vomit blood.    Your bowel movements are black or bloody.    You cannot stop vomiting, or it hurts to swallow.  Call your doctor if:    You have questions or concerns about your condition or care.    Medicines:    Medicines may be given to treat the cause of your chest pain. Examples include pain medicine, anxiety medicine, or medicines to increase blood flow to your heart.    Do not take certain medicines without asking your healthcare provider first. These include NSAIDs, herbal or vitamin supplements, and hormones, such as estrogen or progestin.    Take your medicine as directed. Contact your healthcare provider if you think your medicine is not helping or if you have side effects. Tell your provider if you are allergic to any medicine. Keep a list of the medicines, vitamins, and herbs you take. Include the amounts, and when and why you take them. Bring the list or the pill bottles to follow-up visits. Carry your medicine list with you in case of an emergency.  Healthy living tips: If the cause of your chest pain is known, your healthcare provider will give you specific guidelines to follow. The following are general healthy guidelines:    Do not smoke. Nicotine and other chemicals in cigarettes and cigars can cause lung and heart damage. Ask your healthcare provider for information if you currently smoke and need help to quit. E-cigarettes or smokeless tobacco still contain nicotine. Talk to your healthcare provider before you use these products.    Choose a variety of healthy foods as often as possible. Include fresh, frozen, or canned fruits and vegetables. Also include low-fat dairy products, fish, chicken (without skin), and lean meats. Your healthcare provider or a dietitian can help you create meal plans. You may need to avoid certain foods or drinks if your pain is caused by a digestion problem.  Healthy Foods      Lower your sodium (salt) intake. Limit foods that are high in sodium, such as canned foods, salty snacks, and cold cuts. If you add salt when you cook food, do not add more at the table. Choose low-sodium canned foods as much as possible.        Drink plenty of water every day. Water helps your body to control your temperature and blood pressure. Ask your healthcare provider how much water you should drink every day.    Ask about activity. Your healthcare provider will tell you which activities to limit or avoid. Ask when you can drive, return to work, and have sex. Ask about the best exercise plan for you.    Maintain a healthy weight. Ask your healthcare provider what a healthy weight is for you. Ask him or her to help you create a safe weight loss plan if you are overweight.    Ask about vaccines you may need. Your healthcare provider can tell you which vaccines you need, and when to get them. The following vaccines help prevent diseases that can become serious for a person with a heart condition:  The influenza (flu) vaccine is given each year. Get a flu vaccine as soon as recommended, usually in September or October.    The pneumonia vaccine is usually given every 5 years. Your healthcare provider may recommend the pneumonia vaccine if you are 65 or older.    COVID-19 vaccines are given to adults as a shot in 1 or 2 doses. Vaccination is recommended for all adults. A booster (additional) dose is also recommended for all adults. A second booster is recommended for all adults 50 or older and for immunocompromised adults 18 or older. The second booster is also recommended for adults who received the 1-dose vaccine for the first and booster doses. Your healthcare provider can tell you when to get one or both boosters.  Prevent Heart Disease   Follow up with your doctor within 72 hours, or as directed: You may need to return for more tests to find the cause of your chest pain. You may be referred to a specialist, such as a cardiologist or gastroenterologist. Write down your questions so you remember to ask them during your visits.    Palpitations  Palpitations are feelings that your heartbeat is irregular or is faster than normal. It may feel like your heart is fluttering or skipping a beat. Palpitations may be caused by many things, including smoking, caffeine, alcohol, stress, and certain medicines or drugs. Most causes of palpitations are not serious.    However, some palpitations can be a sign of a serious problem. Further tests and a thorough medical history will be done to find the cause of your palpitations. Your provider may order tests such as an ECG, labs, an echocardiogram, or an ambulatory continuous ECG monitor.    Follow these instructions at home:  Pay attention to any changes in your symptoms. Let your health care provider know about them. Take these actions to help manage your symptoms:    Eating and drinking    Follow instructions from your health care provider about eating or drinking restrictions. You may need to avoid foods and drinks that may cause palpitations. These may include:  Caffeinated coffee, tea, soft drinks, and energy drinks.  Chocolate.  Alcohol.  Diet pills.  Lifestyle    A person sitting on the floor doing yoga.  A sign telling the reader not to smoke.  Take steps to reduce your stress and anxiety. Things that can help you relax include:  Yoga.  Mind-body activities, such as deep breathing, meditation, or using words and images to create positive thoughts (guided imagery).  Physical activity, such as swimming, jogging, or walking. Tell your health care provider if your palpitations increase with activity. If you have chest pain or shortness of breath with activity, do not continue the activity until you are seen by your health care provider.  Biofeedback. This is a method that helps you learn to use your mind to control things in your body, such as your heartbeat.  Get plenty of rest and sleep. Keep a regular bed time.  Do not use drugs, including cocaine or ecstasy. Do not use marijuana.  Do not use any products that contain nicotine or tobacco. These products include cigarettes, chewing tobacco, and vaping devices, such as e-cigarettes. If you need help quitting, ask your health care provider.  General instructions    Take over-the-counter and prescription medicines only as told by your health care provider.  Keep all follow-up visits. This is important. These may include visits for further testing if palpitations do not go away or get worse.  Contact a health care provider if:  You continue to have a fast or irregular heartbeat for a long period of time.  You notice that your palpitations occur more often.  Get help right away if:  You have chest pain or shortness of breath.  You have a severe headache.  You feel dizzy or you faint.  These symptoms may represent a serious problem that is an emergency. Do not wait to see if the symptoms will go away. Get medical help right away. Call your local emergency services (911 in the [place] Do not drive yourself to the hospital.    Summary  Palpitations are feelings that your heartbeat is irregular or is faster than normal. It may feel like your heart is fluttering or skipping a beat.  Palpitations may be caused by many things, including smoking, caffeine, alcohol, stress, certain medicines, and drugs.  Further tests and a thorough medical history may be done to find the cause of your palpitations.  Get help right away if you faint or have chest pain, shortness of breath, severe headache, or dizziness.  This information is not intended to replace advice given to you by your health care provider. Make sure you discuss any questions you have with your health care provider.

## 2023-05-12 NOTE — CONSULT NOTE ADULT - ASSESSMENT
87-year-old woman with a history of hypercholesterolemia, palpitations, mild carotid atherosclerosis, mild aortic insufficiency and osteoporosis.  She has had palpitations in the past, attributable in part to premature beats, and on the basis of anxiety.  She has not been known to have any structural heart disease, and had a negative echocardiogram and nuclear stress test and 2021.    At baseline, she has been physically active, and exercising several times a week.  With regular activities she feels well, without reproducible chest discomfort or shortness of breath suggestive of angina.  She denies orthopnea, PND and edema.  She denies dizziness and syncope.  She has had intermittent palpitations in the past, and has been reassured about this.    She has been in her usual state of health generally.  She has been worried about her son, who is bipolar, and who lost his job and has been running through his savings.  In that context, she went to bed last night feeling well.  She awoke last night with palpitations, further described as a sense that her heart was beating somewhat irregularly and forcefully.  She became concerned, and therefore came to the emergency department for further evaluation.  EKG revealed a sinus rhythm with PACs.  Troponins were negative.  Cardiology consultation is now being obtained.    -there is no evidence of acute ischemia.  -neg ce  -normal nuc stress 2021    -there is no evidence of significant arrhythmia.  -sr pacs on ekg and tele    -there is no evidence for meaningful  volume overload.  -normal ef and mild ar on echo 2021    -palps likely from anxiety and pacs noted on ekg and tele  -can consider the addition of bb but her bp is marginal, and the risk of hypotension seems to exceed the benefit  -has an appt to see dr medellin in several weeks and she will keep that

## 2023-05-12 NOTE — CONSULT NOTE ADULT - SUBJECTIVE AND OBJECTIVE BOX
[facility]         Tori Vila, Manohar Damon Savella        [phone] (office)    CHIEF COMPLAINT: Patient is a 87y old  Female who presents with a chief complaint of palp    HPI:  The patient is an 87-year-old woman with a history of hypercholesterolemia, palpitations, mild carotid atherosclerosis, mild aortic insufficiency and osteoporosis.  She has had palpitations in the past, attributable in part to premature beats, and on the basis of anxiety.  She has not been known to have any structural heart disease, and had a negative echocardiogram and nuclear stress test and 2021.    At baseline, she has been physically active, and exercising several times a week.  With regular activities she feels well, without reproducible chest discomfort or shortness of breath suggestive of angina.  She denies orthopnea, PND and edema.  She denies dizziness and syncope.  She has had intermittent palpitations in the past, and has been reassured about this.    She has been in her usual state of health generally.  She has been worried about her son, who is bipolar, and who lost his job and has been running through his savings.  In that context, she went to bed last night feeling well.  She awoke last night with palpitations, further described as a sense that her heart was beating somewhat irregularly and forcefully.  She became concerned, and therefore came to the emergency department for further evaluation.  EKG revealed a sinus rhythm with PACs.  Troponins were negative.  Cardiology consultation is now being obtained.    PAST MEDICAL & SURGICAL HISTORY:  Arthritis      Osteoporosis      Prolapsed uterus      Celiac sprue      Elective surgery  pessary placed  as outpatient  2016      H/O major orthopedic surgery  kell placed due to fx          SOCIAL HISTORY: No active tobacco, alcohol or illicit drug use    FAMILY HISTORY:   No pertinent family history of CAD    Outpatient medications:  esomeprazole, flonase, crestor    MEDICATIONS  (STANDING):    MEDICATIONS  (PRN):      Allergies    Gluten (Flatulence; Other; Diarrhea)  No Known Drug Allergies    Intolerances        REVIEW OF SYSTEMS: Is negative for eye, ENT, GI, , allergic, dermatologic, musculoskeletal and neurologic, except as described above.    VITAL SIGNS:   Vital Signs Last 24 Hrs  T(C): 36.4 (12 May 2023 01:25), Max: 36.4 (12 May 2023 01:25)  T(F): 97.5 (12 May 2023 01:25), Max: 97.5 (12 May 2023 01:25)  HR: 67 (12 May 2023 07:25) (67 - 85)  BP: 112/59 (12 May 2023 07:25) (112/59 - 169/80)  BP(mean): --  RR: 16 (12 May 2023 07:25) (16 - 20)  SpO2: 99% (12 May 2023 07:25) (99% - 100%)    Parameters below as of 12 May 2023 07:25  Patient On (Oxygen Delivery Method): room air        I&O's Summary      PHYSICAL EXAM:    Constitutional: NAD, awake and alert, well-developed  Eyes:  EOMI, no oral cyanosis, conjunctivae clear, anicteric.  Pulmonary: Non-labored, breath sounds are clear bilaterally, no wheezing, rales or rhonchi  Cardiovascular:  regular S1 and S2. occ moody beats No murmur.  No rubs, gallops or clicks  Gastrointestinal: Bowel Sounds present, soft, nontender.   Lymph: No peripheral edema.   Neurological: Alert, strength and sensitivity are grossly intact  Skin: No obvious lesions/rashes.   Psych:  Mood & affect appropriate .    LABS: All Labs Reviewed:                        13.8   4.99  )-----------( 333      ( 12 May 2023 03:05 )             41.4     12 May 2023 03:05    131    |  98     |  12     ----------------------------<  80     3.9     |  26     |  0.65     Ca    9.5        12 May 2023 03:05  Mg     2.1       12 May 2023 03:05    TPro  6.8    /  Alb  3.6    /  TBili  0.6    /  DBili  x      /  AST  26     /  ALT  23     /  AlkPhos  54     12 May 2023 03:05          Blood Culture:         RADIOLOGY:    EKG: sr pacs

## 2023-05-17 ENCOUNTER — APPOINTMENT (OUTPATIENT)
Dept: GASTROENTEROLOGY | Facility: CLINIC | Age: 88
End: 2023-05-17
Payer: MEDICARE

## 2023-05-17 VITALS
HEART RATE: 79 BPM | SYSTOLIC BLOOD PRESSURE: 143 MMHG | WEIGHT: 110 LBS | DIASTOLIC BLOOD PRESSURE: 78 MMHG | HEIGHT: 59 IN | BODY MASS INDEX: 22.18 KG/M2

## 2023-05-17 PROCEDURE — 99203 OFFICE O/P NEW LOW 30 MIN: CPT

## 2023-05-17 NOTE — PHYSICAL EXAM
[Alert] : alert [Normal Voice/Communication] : normal voice/communication [Healthy Appearing] : healthy appearing [No Acute Distress] : no acute distress [Sclera] : the sclera and conjunctiva were normal [Hearing Threshold Finger Rub Not [place]] : hearing was normal [Normal Lips/Gums] : the lips and gums were normal [Oropharynx] : the oropharynx was normal [Normal Appearance] : the appearance of the neck was normal [No Neck Mass] : no neck mass was observed [No Respiratory Distress] : no respiratory distress [No Acc Muscle Use] : no accessory muscle use [Respiration, Rhythm And Depth] : normal respiratory rhythm and effort [Auscultation Breath Sounds / Voice Sounds] : lungs were clear to auscultation bilaterally [Heart Rate And Rhythm] : heart rate was normal and rhythm regular [Normal S1, S2] : normal S1 and S2 [Murmurs] : no murmurs [Bowel Sounds] : normal bowel sounds [Abdomen Tenderness] : non-tender [No Masses] : no abdominal mass palpated [Abdomen Soft] : soft [] : no hepatosplenomegaly [Oriented To Time, Place, And Person] : oriented to person, place, and time

## 2023-05-17 NOTE — ASSESSMENT
[FreeTextEntry1] : 86 yo female with history of dysphagia.  There is a primary globus component to symptoms and patient has been under great stress recently.  Plan to initiate pantoprazole 40 mg daily, and obtain barium esophagram to evaluate.  Consider endoscopy based on findings.

## 2023-05-17 NOTE — HISTORY OF PRESENT ILLNESS
[FreeTextEntry1] : Ms. ALPA BHATIA is a 87 year old female with history of globus/dysphagia.  Patient has a long history of heartburn symptoms and has been on a number of PPIs.  Currently she is taking Pepcid Complete with mild control of symptoms.  Patient has noted some globus sensation in her throat.  Patient reports it feels like a ball in her throat.  Patient has seen ENT and had a normal examination.  There is been no weight loss.  Patient has a history of celiac disease which was documented by endoscopy many years ago.\par

## 2023-05-22 ENCOUNTER — OUTPATIENT (OUTPATIENT)
Dept: OUTPATIENT SERVICES | Facility: HOSPITAL | Age: 88
LOS: 1 days | End: 2023-05-22
Payer: MEDICARE

## 2023-05-22 ENCOUNTER — RESULT REVIEW (OUTPATIENT)
Age: 88
End: 2023-05-22

## 2023-05-22 DIAGNOSIS — Z98.890 OTHER SPECIFIED POSTPROCEDURAL STATES: Chronic | ICD-10-CM

## 2023-05-22 DIAGNOSIS — Z41.9 ENCOUNTER FOR PROCEDURE FOR PURPOSES OTHER THAN REMEDYING HEALTH STATE, UNSPECIFIED: Chronic | ICD-10-CM

## 2023-05-22 DIAGNOSIS — R13.10 DYSPHAGIA, UNSPECIFIED: ICD-10-CM

## 2023-05-22 PROCEDURE — 74220 X-RAY XM ESOPHAGUS 1CNTRST: CPT | Mod: 26

## 2023-05-22 PROCEDURE — 74220 X-RAY XM ESOPHAGUS 1CNTRST: CPT

## 2023-05-23 ENCOUNTER — NON-APPOINTMENT (OUTPATIENT)
Age: 88
End: 2023-05-23

## 2023-05-23 DIAGNOSIS — R13.10 DYSPHAGIA, UNSPECIFIED: ICD-10-CM

## 2023-06-19 ENCOUNTER — APPOINTMENT (OUTPATIENT)
Dept: UROGYNECOLOGY | Facility: CLINIC | Age: 88
End: 2023-06-19
Payer: MEDICARE

## 2023-06-19 PROCEDURE — 99213 OFFICE O/P EST LOW 20 MIN: CPT

## 2023-06-20 ENCOUNTER — APPOINTMENT (OUTPATIENT)
Dept: CARDIOLOGY | Facility: CLINIC | Age: 88
End: 2023-06-20
Payer: MEDICARE

## 2023-06-20 ENCOUNTER — NON-APPOINTMENT (OUTPATIENT)
Age: 88
End: 2023-06-20

## 2023-06-20 VITALS
DIASTOLIC BLOOD PRESSURE: 71 MMHG | SYSTOLIC BLOOD PRESSURE: 137 MMHG | HEIGHT: 59 IN | HEART RATE: 67 BPM | WEIGHT: 110 LBS | BODY MASS INDEX: 22.18 KG/M2 | OXYGEN SATURATION: 100 %

## 2023-06-20 PROCEDURE — 99215 OFFICE O/P EST HI 40 MIN: CPT

## 2023-06-20 PROCEDURE — 93000 ELECTROCARDIOGRAM COMPLETE: CPT

## 2023-06-22 NOTE — PROCEDURE
[Good Fit] : fits well [Discharge] : there is vaginal discharge [Pessary Inserted] : inserted [Pessary Washed] : washed [H2O] : H2O [None] : no bleeding [Medication Review] : Medicaiton Review: Patient verbalizes understanding of risks and benefits [Refit] : refit is not needed [Erosion] : no evidence of erosion [Erythema] : no erythema [Infection] : no evidence of infection [FreeTextEntry1] : Incontinence dish #4 [de-identified] : scant yellow discharge [FreeTextEntry3] : Replens, Yuvafem [FreeTextEntry8] : Reinforced daily pericare.  Continue pessary self-care.

## 2023-07-19 ENCOUNTER — RX RENEWAL (OUTPATIENT)
Age: 88
End: 2023-07-19

## 2023-08-16 ENCOUNTER — APPOINTMENT (OUTPATIENT)
Dept: INTERNAL MEDICINE | Facility: CLINIC | Age: 88
End: 2023-08-16
Payer: MEDICARE

## 2023-08-16 VITALS
HEART RATE: 74 BPM | BODY MASS INDEX: 20.96 KG/M2 | WEIGHT: 104 LBS | DIASTOLIC BLOOD PRESSURE: 70 MMHG | HEIGHT: 59 IN | TEMPERATURE: 97.6 F | SYSTOLIC BLOOD PRESSURE: 120 MMHG | OXYGEN SATURATION: 96 % | RESPIRATION RATE: 16 BRPM

## 2023-08-16 DIAGNOSIS — Z00.00 ENCOUNTER FOR GENERAL ADULT MEDICAL EXAMINATION W/OUT ABNORMAL FINDINGS: ICD-10-CM

## 2023-08-16 DIAGNOSIS — R00.2 PALPITATIONS: ICD-10-CM

## 2023-08-16 PROCEDURE — G0439: CPT

## 2023-08-16 NOTE — HISTORY OF PRESENT ILLNESS
[FreeTextEntry1] : cpe [de-identified] : Pt was at the hospital in May for palpitations.  Had GI series for dysphagia for swallowing pills. No problems swallowing food. Has gerd and Celiac disease. Has lost weight. Doesn't feel like eating in heat and humid weather. Hard to find things she can eat with gerd diet.  Only vitamin she takes is vit D.  Plans to get shingrix.  Has osteoporosis and not currently on tx. She wants to have her dental work done first.

## 2023-08-16 NOTE — HEALTH RISK ASSESSMENT
[Good] : ~his/her~  mood as  good [No] : No [Little interest or pleasure doing things] : 1) Little interest or pleasure doing things [Feeling down, depressed, or hopeless] : 2) Feeling down, depressed, or hopeless [0] : 2) Feeling down, depressed, or hopeless: Not at all (0) [PHQ-2 Negative - No further assessment needed] : PHQ-2 Negative - No further assessment needed [EPA1Ngrna] : 0 [Fully functional (bathing, dressing, toileting, transferring, walking, feeding)] : Fully functional (bathing, dressing, toileting, transferring, walking, feeding) [Fully functional (using the telephone, shopping, preparing meals, housekeeping, doing laundry, using] : Fully functional and needs no help or supervision to perform IADLs (using the telephone, shopping, preparing meals, housekeeping, doing laundry, using transportation, managing medications and managing finances) [Never] : Never

## 2023-08-16 NOTE — ASSESSMENT
[FreeTextEntry1] : osteoporosis consider tx after dental work done  hyperlipidemia continue rosuvastatin  weight loss  check abdominal ultrasound

## 2023-08-17 LAB
25(OH)D3 SERPL-MCNC: 63.6 NG/ML
ALBUMIN SERPL ELPH-MCNC: 4.5 G/DL
ALP BLD-CCNC: 72 U/L
ALT SERPL-CCNC: 19 U/L
ANION GAP SERPL CALC-SCNC: 11 MMOL/L
APPEARANCE: CLEAR
AST SERPL-CCNC: 26 U/L
BACTERIA: ABNORMAL /HPF
BILIRUB SERPL-MCNC: 0.6 MG/DL
BILIRUBIN URINE: NEGATIVE
BLOOD URINE: NEGATIVE
BUN SERPL-MCNC: 10 MG/DL
CALCIUM SERPL-MCNC: 10.1 MG/DL
CAST: 0 /LPF
CHLORIDE SERPL-SCNC: 100 MMOL/L
CHOLEST SERPL-MCNC: 171 MG/DL
CO2 SERPL-SCNC: 28 MMOL/L
COLOR: YELLOW
CREAT SERPL-MCNC: 0.69 MG/DL
EGFR: 84 ML/MIN/1.73M2
EPITHELIAL CELLS: 5 /HPF
ESTIMATED AVERAGE GLUCOSE: 108 MG/DL
FOLATE SERPL-MCNC: 17.6 NG/ML
GLUCOSE QUALITATIVE U: NEGATIVE MG/DL
GLUCOSE SERPL-MCNC: 80 MG/DL
HBA1C MFR BLD HPLC: 5.4 %
HDLC SERPL-MCNC: 68 MG/DL
KETONES URINE: ABNORMAL MG/DL
LDLC SERPL CALC-MCNC: 92 MG/DL
LEUKOCYTE ESTERASE URINE: ABNORMAL
LPL SERPL-CCNC: 61 U/L
MICROSCOPIC-UA: NORMAL
NITRITE URINE: NEGATIVE
NONHDLC SERPL-MCNC: 103 MG/DL
PH URINE: 6.5
POTASSIUM SERPL-SCNC: 4.3 MMOL/L
PROT SERPL-MCNC: 6.8 G/DL
PROTEIN URINE: NEGATIVE MG/DL
RED BLOOD CELLS URINE: 1 /HPF
REVIEW: NORMAL
SODIUM SERPL-SCNC: 140 MMOL/L
SPECIFIC GRAVITY URINE: 1.01
TRIGL SERPL-MCNC: 56 MG/DL
TSH SERPL-ACNC: 2.93 UIU/ML
URATE SERPL-MCNC: 3.6 MG/DL
UROBILINOGEN URINE: 0.2 MG/DL
VIT B12 SERPL-MCNC: 962 PG/ML
WHITE BLOOD CELLS URINE: 3 /HPF

## 2023-08-26 ENCOUNTER — APPOINTMENT (OUTPATIENT)
Dept: ULTRASOUND IMAGING | Facility: CLINIC | Age: 88
End: 2023-08-26
Payer: MEDICARE

## 2023-08-26 ENCOUNTER — OUTPATIENT (OUTPATIENT)
Dept: OUTPATIENT SERVICES | Facility: HOSPITAL | Age: 88
LOS: 1 days | End: 2023-08-26
Payer: MEDICARE

## 2023-08-26 DIAGNOSIS — Z98.890 OTHER SPECIFIED POSTPROCEDURAL STATES: Chronic | ICD-10-CM

## 2023-08-26 DIAGNOSIS — K90.0 CELIAC DISEASE: ICD-10-CM

## 2023-08-26 DIAGNOSIS — Z41.9 ENCOUNTER FOR PROCEDURE FOR PURPOSES OTHER THAN REMEDYING HEALTH STATE, UNSPECIFIED: Chronic | ICD-10-CM

## 2023-08-26 DIAGNOSIS — R63.4 ABNORMAL WEIGHT LOSS: ICD-10-CM

## 2023-08-26 PROCEDURE — 76700 US EXAM ABDOM COMPLETE: CPT | Mod: 26

## 2023-08-26 PROCEDURE — 76700 US EXAM ABDOM COMPLETE: CPT

## 2023-08-28 ENCOUNTER — TRANSCRIPTION ENCOUNTER (OUTPATIENT)
Age: 88
End: 2023-08-28

## 2023-09-07 ENCOUNTER — OFFICE (OUTPATIENT)
Dept: URBAN - METROPOLITAN AREA CLINIC 109 | Facility: CLINIC | Age: 88
Setting detail: OPHTHALMOLOGY
End: 2023-09-07
Payer: MEDICARE

## 2023-09-07 DIAGNOSIS — H02.401: ICD-10-CM

## 2023-09-07 DIAGNOSIS — H26.492: ICD-10-CM

## 2023-09-07 DIAGNOSIS — H40.013: ICD-10-CM

## 2023-09-07 DIAGNOSIS — H01.001: ICD-10-CM

## 2023-09-07 DIAGNOSIS — H01.005: ICD-10-CM

## 2023-09-07 DIAGNOSIS — H01.002: ICD-10-CM

## 2023-09-07 DIAGNOSIS — H52.7: ICD-10-CM

## 2023-09-07 DIAGNOSIS — H02.015: ICD-10-CM

## 2023-09-07 DIAGNOSIS — H01.004: ICD-10-CM

## 2023-09-07 PROCEDURE — 92014 COMPRE OPH EXAM EST PT 1/>: CPT | Performed by: OPHTHALMOLOGY

## 2023-09-07 PROCEDURE — 92015 DETERMINE REFRACTIVE STATE: CPT | Performed by: OPHTHALMOLOGY

## 2023-09-07 PROCEDURE — 67820 REVISE EYELASHES: CPT | Performed by: OPHTHALMOLOGY

## 2023-09-07 ASSESSMENT — SPHEQUIV_DERIVED
OS_SPHEQUIV: 0.5
OS_SPHEQUIV: 0
OS_SPHEQUIV: 0.375
OS_SPHEQUIV: 0.5
OD_SPHEQUIV: 0.5
OD_SPHEQUIV: -0.125
OD_SPHEQUIV: 0.5

## 2023-09-07 ASSESSMENT — REFRACTION_CURRENTRX
OD_OVR_VA: 20/
OS_OVR_VA: 20/
OS_AXIS: 100
OS_ADD: +2.25
OD_AXIS: 165
OD_CYLINDER: -0.25
OD_ADD: +2.75
OD_SPHERE: PLANO
OS_SPHERE: PLANO
OS_CYLINDER: -0.25

## 2023-09-07 ASSESSMENT — LID EXAM ASSESSMENTS
OD_TRICHIASIS: T
OS_MEIBOMITIS: LLL LUL 2+
OS_BLEPHARITIS: LLL LUL 1+ 2+
OS_TRICHIASIS: LLL 1+
OD_MEIBOMITIS: RLL RUL 2+
OD_BLEPHARITIS: RLL RUL 1+ 2+

## 2023-09-07 ASSESSMENT — REFRACTION_AUTOREFRACTION
OD_CYLINDER: -1.00
OS_SPHERE: +1.00
OD_SPHERE: +1.00
OS_AXIS: 78
OD_AXIS: 93
OS_CYLINDER: -1.00

## 2023-09-07 ASSESSMENT — VISUAL ACUITY
OD_BCVA: 20/40-3
OS_BCVA: 20/30-1

## 2023-09-07 ASSESSMENT — REFRACTION_MANIFEST
OS_AXIS: 68
OS_CYLINDER: -0.75
OS_ADD: +2.25
OD_CYLINDER: -0.75
OD_VA1: 20/25
OD_SPHERE: +0.25
OD_AXIS: 119
OS_ADD: +2.50
OD_SPHERE: PL
OD_VA1: 20/25-
OD_ADD: +2.25
OS_CYLINDER: -1.00
OD_AXIS: 95
OS_SPHERE: +0.75
OD_VA1: 20/25+2
OD_SPHERE: +1.00
OD_CYLINDER: -0.50
OS_AXIS: 75
OS_AXIS: 80
OS_SPHERE: +0.50
OS_CYLINDER: -1.00
OS_SPHERE: +1.00
OD_AXIS: 90
OD_ADD: +2.50
OS_VA1: 20/30+2
OS_VA1: 20/40-
OD_CYLINDER: -1.00

## 2023-09-07 ASSESSMENT — TONOMETRY
OD_IOP_MMHG: 15
OD_IOP_MMHG: 15
OS_IOP_MMHG: 15
OS_IOP_MMHG: 15

## 2023-09-07 ASSESSMENT — CONFRONTATIONAL VISUAL FIELD TEST (CVF)
OD_FINDINGS: FULL
OS_FINDINGS: FULL

## 2023-09-07 ASSESSMENT — LID POSITION - PTOSIS: OD_PTOSIS: RUL 1+ 2+

## 2023-09-08 ENCOUNTER — NON-APPOINTMENT (OUTPATIENT)
Age: 88
End: 2023-09-08

## 2023-09-15 ENCOUNTER — APPOINTMENT (OUTPATIENT)
Dept: OTOLARYNGOLOGY | Facility: CLINIC | Age: 88
End: 2023-09-15
Payer: MEDICARE

## 2023-09-15 VITALS — WEIGHT: 104 LBS | TEMPERATURE: 97.6 F | HEIGHT: 59 IN | BODY MASS INDEX: 20.96 KG/M2

## 2023-09-15 DIAGNOSIS — R09.81 NASAL CONGESTION: ICD-10-CM

## 2023-09-15 DIAGNOSIS — K21.9 GASTRO-ESOPHAGEAL REFLUX DISEASE W/OUT ESOPHAGITIS: ICD-10-CM

## 2023-09-15 DIAGNOSIS — J34.2 DEVIATED NASAL SEPTUM: ICD-10-CM

## 2023-09-15 DIAGNOSIS — H61.23 IMPACTED CERUMEN, BILATERAL: ICD-10-CM

## 2023-09-15 DIAGNOSIS — R09.82 POSTNASAL DRIP: ICD-10-CM

## 2023-09-15 DIAGNOSIS — J34.3 HYPERTROPHY OF NASAL TURBINATES: ICD-10-CM

## 2023-09-15 DIAGNOSIS — J32.4 CHRONIC PANSINUSITIS: ICD-10-CM

## 2023-09-15 DIAGNOSIS — H90.3 SENSORINEURAL HEARING LOSS, BILATERAL: ICD-10-CM

## 2023-09-15 DIAGNOSIS — J32.0 CHRONIC MAXILLARY SINUSITIS: ICD-10-CM

## 2023-09-15 PROCEDURE — 69210 REMOVE IMPACTED EAR WAX UNI: CPT

## 2023-09-15 PROCEDURE — 31231 NASAL ENDOSCOPY DX: CPT

## 2023-09-15 PROCEDURE — 99214 OFFICE O/P EST MOD 30 MIN: CPT | Mod: 25

## 2023-09-16 ENCOUNTER — APPOINTMENT (OUTPATIENT)
Dept: ULTRASOUND IMAGING | Facility: CLINIC | Age: 88
End: 2023-09-16
Payer: MEDICARE

## 2023-09-16 ENCOUNTER — OUTPATIENT (OUTPATIENT)
Dept: OUTPATIENT SERVICES | Facility: HOSPITAL | Age: 88
LOS: 1 days | End: 2023-09-16
Payer: MEDICARE

## 2023-09-16 ENCOUNTER — RESULT REVIEW (OUTPATIENT)
Age: 88
End: 2023-09-16

## 2023-09-16 DIAGNOSIS — E04.1 NONTOXIC SINGLE THYROID NODULE: ICD-10-CM

## 2023-09-16 DIAGNOSIS — Z98.890 OTHER SPECIFIED POSTPROCEDURAL STATES: Chronic | ICD-10-CM

## 2023-09-16 DIAGNOSIS — Z41.9 ENCOUNTER FOR PROCEDURE FOR PURPOSES OTHER THAN REMEDYING HEALTH STATE, UNSPECIFIED: Chronic | ICD-10-CM

## 2023-09-16 PROCEDURE — 76536 US EXAM OF HEAD AND NECK: CPT

## 2023-09-16 PROCEDURE — 76536 US EXAM OF HEAD AND NECK: CPT | Mod: 26

## 2023-10-03 NOTE — HISTORY OF PRESENT ILLNESS
Family has been updated. [Cystocele (Obstetric)] : none [Vaginal Wall Prolapse] : none [Rectal Prolapse] : none [Feelings Of Urinary Urgency] : none [Unable To Restrain Bowel Movement] : rare [Urinary Frequency] : none [Urinary Tract Infection] : none [Constipation Obstructed Defecation] : none [Vaginal Pain] : none [Pelvic Pain] : none [Bladder Pain] : none [Rectal Pain] : none [Back Pain] : none [FreeTextEntry1] : \hyun Aleman presents for follow up with a h/o HANY and prolapse. She is currently managed with an incontinence dish pessary. She is using Yuvafem/Replensand denies side effects. She reports being able to remove/insert on her own but has only been doing it every other week. She reports some difficulty secondary to arthritis.

## 2023-10-05 ENCOUNTER — APPOINTMENT (OUTPATIENT)
Dept: DERMATOLOGY | Facility: CLINIC | Age: 88
End: 2023-10-05

## 2023-10-09 ENCOUNTER — APPOINTMENT (OUTPATIENT)
Dept: UROGYNECOLOGY | Facility: CLINIC | Age: 88
End: 2023-10-09
Payer: MEDICARE

## 2023-10-09 VITALS
BODY MASS INDEX: 20.96 KG/M2 | WEIGHT: 104 LBS | HEIGHT: 59 IN | SYSTOLIC BLOOD PRESSURE: 145 MMHG | DIASTOLIC BLOOD PRESSURE: 70 MMHG

## 2023-10-09 PROCEDURE — 99213 OFFICE O/P EST LOW 20 MIN: CPT

## 2023-10-10 ENCOUNTER — APPOINTMENT (OUTPATIENT)
Dept: ENDOCRINOLOGY | Facility: CLINIC | Age: 88
End: 2023-10-10
Payer: MEDICARE

## 2023-10-10 VITALS
OXYGEN SATURATION: 99 % | WEIGHT: 104 LBS | HEART RATE: 66 BPM | DIASTOLIC BLOOD PRESSURE: 72 MMHG | HEIGHT: 59 IN | BODY MASS INDEX: 20.96 KG/M2 | SYSTOLIC BLOOD PRESSURE: 142 MMHG

## 2023-10-10 DIAGNOSIS — E04.2 NONTOXIC MULTINODULAR GOITER: ICD-10-CM

## 2023-10-10 PROCEDURE — 99214 OFFICE O/P EST MOD 30 MIN: CPT

## 2023-10-12 ENCOUNTER — RX RENEWAL (OUTPATIENT)
Age: 88
End: 2023-10-12

## 2023-12-05 ENCOUNTER — APPOINTMENT (OUTPATIENT)
Dept: CARDIOLOGY | Facility: CLINIC | Age: 88
End: 2023-12-05
Payer: MEDICARE

## 2023-12-05 VITALS
HEART RATE: 67 BPM | SYSTOLIC BLOOD PRESSURE: 167 MMHG | OXYGEN SATURATION: 99 % | BODY MASS INDEX: 20.56 KG/M2 | WEIGHT: 102 LBS | DIASTOLIC BLOOD PRESSURE: 83 MMHG | HEIGHT: 59 IN

## 2023-12-05 DIAGNOSIS — R06.09 OTHER FORMS OF DYSPNEA: ICD-10-CM

## 2023-12-05 PROCEDURE — 93000 ELECTROCARDIOGRAM COMPLETE: CPT

## 2023-12-05 PROCEDURE — 99215 OFFICE O/P EST HI 40 MIN: CPT

## 2023-12-05 RX ORDER — FAMOTIDINE/CA CARB/MAG HYDROX 10-800-165
10-800-165 TABLET,CHEWABLE ORAL
Qty: 90 | Refills: 2 | Status: DISCONTINUED | COMMUNITY
Start: 2023-05-05 | End: 2023-12-05

## 2023-12-25 ENCOUNTER — INPATIENT (INPATIENT)
Facility: HOSPITAL | Age: 88
LOS: 0 days | Discharge: HOME | DRG: 641 | End: 2023-12-26
Attending: STUDENT IN AN ORGANIZED HEALTH CARE EDUCATION/TRAINING PROGRAM | Admitting: INTERNAL MEDICINE
Payer: COMMERCIAL

## 2023-12-25 VITALS
HEART RATE: 92 BPM | HEIGHT: 59 IN | RESPIRATION RATE: 92 BRPM | DIASTOLIC BLOOD PRESSURE: 69 MMHG | WEIGHT: 102.07 LBS | OXYGEN SATURATION: 98 % | TEMPERATURE: 98 F | SYSTOLIC BLOOD PRESSURE: 126 MMHG

## 2023-12-25 DIAGNOSIS — Z29.9 ENCOUNTER FOR PROPHYLACTIC MEASURES, UNSPECIFIED: ICD-10-CM

## 2023-12-25 DIAGNOSIS — R55 SYNCOPE AND COLLAPSE: ICD-10-CM

## 2023-12-25 DIAGNOSIS — R79.89 OTHER SPECIFIED ABNORMAL FINDINGS OF BLOOD CHEMISTRY: ICD-10-CM

## 2023-12-25 DIAGNOSIS — Z98.890 OTHER SPECIFIED POSTPROCEDURAL STATES: Chronic | ICD-10-CM

## 2023-12-25 DIAGNOSIS — E78.5 HYPERLIPIDEMIA, UNSPECIFIED: ICD-10-CM

## 2023-12-25 DIAGNOSIS — Z41.9 ENCOUNTER FOR PROCEDURE FOR PURPOSES OTHER THAN REMEDYING HEALTH STATE, UNSPECIFIED: Chronic | ICD-10-CM

## 2023-12-25 DIAGNOSIS — I10 ESSENTIAL (PRIMARY) HYPERTENSION: ICD-10-CM

## 2023-12-25 DIAGNOSIS — E87.8 OTHER DISORDERS OF ELECTROLYTE AND FLUID BALANCE, NOT ELSEWHERE CLASSIFIED: ICD-10-CM

## 2023-12-25 LAB
ALBUMIN SERPL ELPH-MCNC: 3.1 G/DL — LOW (ref 3.3–5)
ALBUMIN SERPL ELPH-MCNC: 3.1 G/DL — LOW (ref 3.3–5)
ALP SERPL-CCNC: 66 U/L — SIGNIFICANT CHANGE UP (ref 40–120)
ALP SERPL-CCNC: 66 U/L — SIGNIFICANT CHANGE UP (ref 40–120)
ALT FLD-CCNC: 28 U/L — SIGNIFICANT CHANGE UP (ref 12–78)
ALT FLD-CCNC: 28 U/L — SIGNIFICANT CHANGE UP (ref 12–78)
ANION GAP SERPL CALC-SCNC: 6 MMOL/L — SIGNIFICANT CHANGE UP (ref 5–17)
ANION GAP SERPL CALC-SCNC: 6 MMOL/L — SIGNIFICANT CHANGE UP (ref 5–17)
AST SERPL-CCNC: 28 U/L — SIGNIFICANT CHANGE UP (ref 15–37)
AST SERPL-CCNC: 28 U/L — SIGNIFICANT CHANGE UP (ref 15–37)
BASOPHILS # BLD AUTO: 0.05 K/UL — SIGNIFICANT CHANGE UP (ref 0–0.2)
BASOPHILS # BLD AUTO: 0.05 K/UL — SIGNIFICANT CHANGE UP (ref 0–0.2)
BASOPHILS NFR BLD AUTO: 1 % — SIGNIFICANT CHANGE UP (ref 0–2)
BASOPHILS NFR BLD AUTO: 1 % — SIGNIFICANT CHANGE UP (ref 0–2)
BILIRUB SERPL-MCNC: 0.5 MG/DL — SIGNIFICANT CHANGE UP (ref 0.2–1.2)
BILIRUB SERPL-MCNC: 0.5 MG/DL — SIGNIFICANT CHANGE UP (ref 0.2–1.2)
BUN SERPL-MCNC: 13 MG/DL — SIGNIFICANT CHANGE UP (ref 7–23)
BUN SERPL-MCNC: 13 MG/DL — SIGNIFICANT CHANGE UP (ref 7–23)
CALCIUM SERPL-MCNC: 8.9 MG/DL — SIGNIFICANT CHANGE UP (ref 8.5–10.1)
CALCIUM SERPL-MCNC: 8.9 MG/DL — SIGNIFICANT CHANGE UP (ref 8.5–10.1)
CHLORIDE SERPL-SCNC: 109 MMOL/L — HIGH (ref 96–108)
CHLORIDE SERPL-SCNC: 109 MMOL/L — HIGH (ref 96–108)
CK MB BLD-MCNC: 5.3 % — HIGH (ref 0–3.5)
CK MB BLD-MCNC: 5.3 % — HIGH (ref 0–3.5)
CK MB CFR SERPL CALC: 3.5 NG/ML — SIGNIFICANT CHANGE UP (ref 0–3.6)
CK MB CFR SERPL CALC: 3.5 NG/ML — SIGNIFICANT CHANGE UP (ref 0–3.6)
CK SERPL-CCNC: 66 U/L — SIGNIFICANT CHANGE UP (ref 26–192)
CK SERPL-CCNC: 66 U/L — SIGNIFICANT CHANGE UP (ref 26–192)
CO2 SERPL-SCNC: 29 MMOL/L — SIGNIFICANT CHANGE UP (ref 22–31)
CO2 SERPL-SCNC: 29 MMOL/L — SIGNIFICANT CHANGE UP (ref 22–31)
CREAT SERPL-MCNC: 0.58 MG/DL — SIGNIFICANT CHANGE UP (ref 0.5–1.3)
CREAT SERPL-MCNC: 0.58 MG/DL — SIGNIFICANT CHANGE UP (ref 0.5–1.3)
EGFR: 88 ML/MIN/1.73M2 — SIGNIFICANT CHANGE UP
EGFR: 88 ML/MIN/1.73M2 — SIGNIFICANT CHANGE UP
EOSINOPHIL # BLD AUTO: 0.09 K/UL — SIGNIFICANT CHANGE UP (ref 0–0.5)
EOSINOPHIL # BLD AUTO: 0.09 K/UL — SIGNIFICANT CHANGE UP (ref 0–0.5)
EOSINOPHIL NFR BLD AUTO: 2 % — SIGNIFICANT CHANGE UP (ref 0–6)
EOSINOPHIL NFR BLD AUTO: 2 % — SIGNIFICANT CHANGE UP (ref 0–6)
GLUCOSE SERPL-MCNC: 78 MG/DL — SIGNIFICANT CHANGE UP (ref 70–99)
GLUCOSE SERPL-MCNC: 78 MG/DL — SIGNIFICANT CHANGE UP (ref 70–99)
HCT VFR BLD CALC: 42.2 % — SIGNIFICANT CHANGE UP (ref 34.5–45)
HCT VFR BLD CALC: 42.2 % — SIGNIFICANT CHANGE UP (ref 34.5–45)
HGB BLD-MCNC: 14.2 G/DL — SIGNIFICANT CHANGE UP (ref 11.5–15.5)
HGB BLD-MCNC: 14.2 G/DL — SIGNIFICANT CHANGE UP (ref 11.5–15.5)
LYMPHOCYTES # BLD AUTO: 0.98 K/UL — LOW (ref 1–3.3)
LYMPHOCYTES # BLD AUTO: 0.98 K/UL — LOW (ref 1–3.3)
LYMPHOCYTES # BLD AUTO: 21 % — SIGNIFICANT CHANGE UP (ref 13–44)
LYMPHOCYTES # BLD AUTO: 21 % — SIGNIFICANT CHANGE UP (ref 13–44)
MANUAL SMEAR VERIFICATION: SIGNIFICANT CHANGE UP
MANUAL SMEAR VERIFICATION: SIGNIFICANT CHANGE UP
MCHC RBC-ENTMCNC: 31.7 PG — SIGNIFICANT CHANGE UP (ref 27–34)
MCHC RBC-ENTMCNC: 31.7 PG — SIGNIFICANT CHANGE UP (ref 27–34)
MCHC RBC-ENTMCNC: 33.6 GM/DL — SIGNIFICANT CHANGE UP (ref 32–36)
MCHC RBC-ENTMCNC: 33.6 GM/DL — SIGNIFICANT CHANGE UP (ref 32–36)
MCV RBC AUTO: 94.2 FL — SIGNIFICANT CHANGE UP (ref 80–100)
MCV RBC AUTO: 94.2 FL — SIGNIFICANT CHANGE UP (ref 80–100)
MONOCYTES # BLD AUTO: 0.79 K/UL — SIGNIFICANT CHANGE UP (ref 0–0.9)
MONOCYTES # BLD AUTO: 0.79 K/UL — SIGNIFICANT CHANGE UP (ref 0–0.9)
MONOCYTES NFR BLD AUTO: 17 % — HIGH (ref 2–14)
MONOCYTES NFR BLD AUTO: 17 % — HIGH (ref 2–14)
NEUTROPHILS # BLD AUTO: 2.76 K/UL — SIGNIFICANT CHANGE UP (ref 1.8–7.4)
NEUTROPHILS # BLD AUTO: 2.76 K/UL — SIGNIFICANT CHANGE UP (ref 1.8–7.4)
NEUTROPHILS NFR BLD AUTO: 59 % — SIGNIFICANT CHANGE UP (ref 43–77)
NEUTROPHILS NFR BLD AUTO: 59 % — SIGNIFICANT CHANGE UP (ref 43–77)
NRBC # BLD: 0 /100 — SIGNIFICANT CHANGE UP (ref 0–0)
NRBC # BLD: 0 /100 — SIGNIFICANT CHANGE UP (ref 0–0)
NRBC # BLD: SIGNIFICANT CHANGE UP /100 WBCS (ref 0–0)
NRBC # BLD: SIGNIFICANT CHANGE UP /100 WBCS (ref 0–0)
PLAT MORPH BLD: NORMAL — SIGNIFICANT CHANGE UP
PLAT MORPH BLD: NORMAL — SIGNIFICANT CHANGE UP
PLATELET # BLD AUTO: 317 K/UL — SIGNIFICANT CHANGE UP (ref 150–400)
PLATELET # BLD AUTO: 317 K/UL — SIGNIFICANT CHANGE UP (ref 150–400)
PLATELET CLUMP BLD QL SMEAR: ABNORMAL
PLATELET CLUMP BLD QL SMEAR: ABNORMAL
POTASSIUM SERPL-MCNC: 3.2 MMOL/L — LOW (ref 3.5–5.3)
POTASSIUM SERPL-MCNC: 3.2 MMOL/L — LOW (ref 3.5–5.3)
POTASSIUM SERPL-SCNC: 3.2 MMOL/L — LOW (ref 3.5–5.3)
POTASSIUM SERPL-SCNC: 3.2 MMOL/L — LOW (ref 3.5–5.3)
PROT SERPL-MCNC: 6.2 G/DL — SIGNIFICANT CHANGE UP (ref 6–8.3)
PROT SERPL-MCNC: 6.2 G/DL — SIGNIFICANT CHANGE UP (ref 6–8.3)
RAPID RVP RESULT: SIGNIFICANT CHANGE UP
RAPID RVP RESULT: SIGNIFICANT CHANGE UP
RBC # BLD: 4.48 M/UL — SIGNIFICANT CHANGE UP (ref 3.8–5.2)
RBC # BLD: 4.48 M/UL — SIGNIFICANT CHANGE UP (ref 3.8–5.2)
RBC # FLD: 13.1 % — SIGNIFICANT CHANGE UP (ref 10.3–14.5)
RBC # FLD: 13.1 % — SIGNIFICANT CHANGE UP (ref 10.3–14.5)
RBC BLD AUTO: NORMAL — SIGNIFICANT CHANGE UP
RBC BLD AUTO: NORMAL — SIGNIFICANT CHANGE UP
SARS-COV-2 RNA SPEC QL NAA+PROBE: SIGNIFICANT CHANGE UP
SARS-COV-2 RNA SPEC QL NAA+PROBE: SIGNIFICANT CHANGE UP
SODIUM SERPL-SCNC: 144 MMOL/L — SIGNIFICANT CHANGE UP (ref 135–145)
SODIUM SERPL-SCNC: 144 MMOL/L — SIGNIFICANT CHANGE UP (ref 135–145)
TROPONIN I, HIGH SENSITIVITY RESULT: 258.9 NG/L — HIGH
TROPONIN I, HIGH SENSITIVITY RESULT: 258.9 NG/L — HIGH
TROPONIN I, HIGH SENSITIVITY RESULT: 346.6 NG/L — HIGH
TROPONIN I, HIGH SENSITIVITY RESULT: 346.6 NG/L — HIGH
TROPONIN I, HIGH SENSITIVITY RESULT: 405.1 NG/L — HIGH
TROPONIN I, HIGH SENSITIVITY RESULT: 405.1 NG/L — HIGH
WBC # BLD: 4.67 K/UL — SIGNIFICANT CHANGE UP (ref 3.8–10.5)
WBC # BLD: 4.67 K/UL — SIGNIFICANT CHANGE UP (ref 3.8–10.5)
WBC # FLD AUTO: 4.67 K/UL — SIGNIFICANT CHANGE UP (ref 3.8–10.5)
WBC # FLD AUTO: 4.67 K/UL — SIGNIFICANT CHANGE UP (ref 3.8–10.5)

## 2023-12-25 PROCEDURE — 93880 EXTRACRANIAL BILAT STUDY: CPT | Mod: 26

## 2023-12-25 PROCEDURE — 71045 X-RAY EXAM CHEST 1 VIEW: CPT | Mod: 26

## 2023-12-25 PROCEDURE — 99223 1ST HOSP IP/OBS HIGH 75: CPT

## 2023-12-25 PROCEDURE — 72125 CT NECK SPINE W/O DYE: CPT | Mod: 26,MA

## 2023-12-25 PROCEDURE — 99285 EMERGENCY DEPT VISIT HI MDM: CPT

## 2023-12-25 PROCEDURE — 99222 1ST HOSP IP/OBS MODERATE 55: CPT | Mod: GC

## 2023-12-25 PROCEDURE — 70450 CT HEAD/BRAIN W/O DYE: CPT | Mod: 26,MA

## 2023-12-25 RX ORDER — ENOXAPARIN SODIUM 100 MG/ML
40 INJECTION SUBCUTANEOUS EVERY 24 HOURS
Refills: 0 | Status: DISCONTINUED | OUTPATIENT
Start: 2023-12-25 | End: 2023-12-26

## 2023-12-25 RX ORDER — ACETAMINOPHEN 500 MG
700 TABLET ORAL ONCE
Refills: 0 | Status: COMPLETED | OUTPATIENT
Start: 2023-12-25 | End: 2023-12-25

## 2023-12-25 RX ORDER — PANTOPRAZOLE SODIUM 20 MG/1
40 TABLET, DELAYED RELEASE ORAL
Refills: 0 | Status: DISCONTINUED | OUTPATIENT
Start: 2023-12-25 | End: 2023-12-26

## 2023-12-25 RX ORDER — SODIUM CHLORIDE 9 MG/ML
1000 INJECTION INTRAMUSCULAR; INTRAVENOUS; SUBCUTANEOUS ONCE
Refills: 0 | Status: COMPLETED | OUTPATIENT
Start: 2023-12-25 | End: 2023-12-25

## 2023-12-25 RX ORDER — FLUTICASONE PROPIONATE 50 MCG
1 SPRAY, SUSPENSION NASAL
Refills: 0 | Status: DISCONTINUED | OUTPATIENT
Start: 2023-12-25 | End: 2023-12-26

## 2023-12-25 RX ORDER — ACETAMINOPHEN 500 MG
700 TABLET ORAL ONCE
Refills: 0 | Status: DISCONTINUED | OUTPATIENT
Start: 2023-12-25 | End: 2023-12-25

## 2023-12-25 RX ORDER — ACETAMINOPHEN 500 MG
650 TABLET ORAL EVERY 6 HOURS
Refills: 0 | Status: DISCONTINUED | OUTPATIENT
Start: 2023-12-25 | End: 2023-12-26

## 2023-12-25 RX ORDER — DENOSUMAB 60 MG/ML
0 INJECTION SUBCUTANEOUS
Qty: 0 | Refills: 0 | DISCHARGE

## 2023-12-25 RX ORDER — ATORVASTATIN CALCIUM 80 MG/1
20 TABLET, FILM COATED ORAL AT BEDTIME
Refills: 0 | Status: DISCONTINUED | OUTPATIENT
Start: 2023-12-25 | End: 2023-12-26

## 2023-12-25 RX ORDER — ESTRADIOL 4 UG/1
1 INSERT VAGINAL
Qty: 0 | Refills: 0 | DISCHARGE

## 2023-12-25 RX ORDER — POTASSIUM CHLORIDE 20 MEQ
40 PACKET (EA) ORAL ONCE
Refills: 0 | Status: COMPLETED | OUTPATIENT
Start: 2023-12-25 | End: 2023-12-25

## 2023-12-25 RX ADMIN — Medication 40 MILLIEQUIVALENT(S): at 10:36

## 2023-12-25 RX ADMIN — SODIUM CHLORIDE 1000 MILLILITER(S): 9 INJECTION INTRAMUSCULAR; INTRAVENOUS; SUBCUTANEOUS at 10:36

## 2023-12-25 RX ADMIN — Medication 280 MILLIGRAM(S): at 16:08

## 2023-12-25 RX ADMIN — Medication 700 MILLIGRAM(S): at 17:02

## 2023-12-25 RX ADMIN — Medication 700 MILLIGRAM(S): at 11:00

## 2023-12-25 RX ADMIN — PANTOPRAZOLE SODIUM 40 MILLIGRAM(S): 20 TABLET, DELAYED RELEASE ORAL at 16:08

## 2023-12-25 RX ADMIN — ENOXAPARIN SODIUM 40 MILLIGRAM(S): 100 INJECTION SUBCUTANEOUS at 16:08

## 2023-12-25 RX ADMIN — Medication 280 MILLIGRAM(S): at 10:36

## 2023-12-25 RX ADMIN — ATORVASTATIN CALCIUM 20 MILLIGRAM(S): 80 TABLET, FILM COATED ORAL at 21:21

## 2023-12-25 NOTE — H&P ADULT - ATTENDING COMMENTS
88 year-old F with PMHx  of supraventricular ectopy, palpitations, dyspnea, mild aortic insufficiency, mild carotid atherosclerosis, HLD, celiac disease, a hepatic cyst, a R kidney cyst, a cystocele, left thyroid nodules, osteoporosis, urinary stress incontinence (pessary) and a left femur stress hairline fracture (thought to be related to therapy with Fosamax or Prolia - status post ORIF 7/18/2018), brought in by EMS status post syncopal episode at home. Admitted for syncope and elevated trop work up     As per cards, will cont to trend cardiac enzymes, monitor on tele, f/u 2d echo, PT eval, and carotid dopplers.  IVF, check orthostatics.    Cheko Price, Attending Physician 88 year-old F with PMHx  of supraventricular ectopy, palpitations, dyspnea, mild aortic insufficiency, mild carotid atherosclerosis, HLD, celiac disease, a hepatic cyst, a R kidney cyst, a cystocele, left thyroid nodules, osteoporosis, urinary stress incontinence (pessary) and a left femur stress hairline fracture (thought to be related to therapy with Fosamax or Prolia - status post ORIF 7/18/2018), brought in by EMS status post syncopal episode at home. Admitted for syncope and elevated trop work up     As per cards, will cont to trend cardiac enzymes, monitor on tele, f/u 2d echo, PT eval, and carotid dopplers.  s/p IVFbolus, check orthostatics.    Cheko Price, Attending Physician

## 2023-12-25 NOTE — CONSULT NOTE ADULT - ASSESSMENT
88 year-old female with a history of supraventricular ectopy, palpitations, dyspnea, mild aortic insufficiency, mild carotid atherosclerosis, a dyslipidemia, celiac disease, a hepatic cyst, a right kidney cyst, a cystocele, left thyroid nodules, osteoporosis, urinary stress incontinence (pessary) brought in by EMS status post syncopal episode at home.    Syncope, elev trops   - EKG: SR with non specific STA   - No clear evidence of acute ischemia.   - trops 258.9, follow 2nd set   - no anginal complaints     - check orthostatic vital signs. s/p IVF in ED  - CTH neg   - no sign of volume overload  - likely more dehydrated per patient as she has not been drinking water as she used to   - encourage fluid intake     - /69, HR 92  - not on any antihypertensive at home     - can check viral panel, per patient and  has been feeling under the weather   - Possibly increased vagal tone from needing to go to bathroom and change of positions lead to transient hypotension/syncope  - no contraindication for discharge if 2nd set CE downtrending, neg orthostatic and if feeling better and tolerating ambulation without any symptoms    - Will continue to follow if admitted     BRETT Dale  Nurse Practitioner - Cardiology   call TEAMS

## 2023-12-25 NOTE — H&P ADULT - PROBLEM SELECTOR PLAN 2
- on admission, trop 248.9 --> 405.1 most likely demand ischemia in the setting of syncope   - EKG: NSR hr 77bpm  No acute st changes   - Will trend to peak   - Cardiology Dr. Ortega following

## 2023-12-25 NOTE — H&P ADULT - NSHPREVIEWOFSYSTEMS_GEN_ALL_CORE
Review of Systems:   CONSTITUTIONAL: No fever, weight loss, or fatigue  EYES: No eye pain, visual disturbances, or discharge  ENMT:   No ear pain, No nose bleed; No sinus or throat pain  NECK: No pain or stiffness  RESPIRATORY: No cough, wheezing, or hemoptysis, no shortness of breath  CARDIOVASCULAR: No chest pain, palpitations, no leg swelling  GASTROINTESTINAL: No abdominal or epigastric pain. No nausea, vomiting, or hematemesis; No diarrhea or constipation. No melena or hematochezia.  GENITOURINARY: No dysuria, frequency, hematuria, or incontinence  NEUROLOGICAL: No headaches, memory loss, loss of strength, numbness; No dizziness  SKIN: No itching, burning, rashes, or lesions   MUSCULOSKELETAL: No joint pain or swelling; No muscle, back, or extremity pain Review of Systems:   CONSTITUTIONAL: No fever, weight loss, or fatigue  EYES: No eye pain, visual disturbances, or discharge  ENMT:   No ear pain, No nose bleed; No sinus or throat pain  NECK: No pain or stiffness  RESPIRATORY: No cough, wheezing, or hemoptysis, no shortness of breath  CARDIOVASCULAR: No chest pain, palpitations, no leg swelling  GASTROINTESTINAL: No abdominal or epigastric pain. No nausea, vomiting, or hematemesis; No diarrhea or constipation. No melena or hematochezia.  GENITOURINARY: No dysuria, frequency, hematuria, or incontinence  NEUROLOGICAL: ++ headache, No memory loss, loss of strength, numbness; No dizziness  SKIN: ++ bump on head No itching, burning, rashes, or lesions   MUSCULOSKELETAL: No joint pain or swelling; No muscle, back, or extremity pain

## 2023-12-25 NOTE — H&P ADULT - PROBLEM SELECTOR PLAN 3
#   K 3.2  Albumin 3.1      # Hypokalemia   - K 3.2 s/p KCL 40mg Oral X1, 1 L NS in ER   - replete PRN  - CMP in am

## 2023-12-25 NOTE — H&P ADULT - PROBLEM SELECTOR PLAN 1
-  likely hypotension/vagal mediated vs dehydration  - s/p OFIRMEV 700mg IV, KCL 40mg Oral X1, 1 L NS in ER    - EKG: NSR hr 77bpm  No acute st changes   - Troponin mildly elevated, though no suggestion of acs  - Will Check orthostatics and RVP  - Follow TTE  - Maintenance IVF NS @ 60cc/h   - Seen by cardio Dr Ortega, following -  likely hypotension/vagal mediated vs dehydration  - s/p OFIRMEV 700mg IV, KCL 40mg Oral X1, 1 L NS in ER    - EKG: NSR hr 77bpm  No acute st changes   - Troponin mildly elevated, though no suggestion of acs  - Will Check orthostatics and RVP  - Follow TTE, carotid dopplers  - Maintenance IVF NS @ 60cc/h   - Seen by cardio Dr Ortega, following

## 2023-12-25 NOTE — H&P ADULT - NSHPLANGLIMITEDENGLISH_GEN_A_CORE
Progress Note  Date:2022       PLR/[id number]  Patient Antonino Ross     YOB: 1967     Age:55 y.o. Subjective    Subjective:  Symptoms:  Stable. She reports weakness. Diet:  Poor intake. Pain:  She complains of pain that is moderate. Review of Systems   Neurological:  Positive for weakness. Objective         Vitals Last 24 Hours:  TEMPERATURE:  Temp  Av.4 °F (36.3 °C)  Min: 97 °F (36.1 °C)  Max: 97.8 °F (36.6 °C)  RESPIRATIONS RANGE: Resp  Av.3  Min: 16  Max: 18  PULSE OXIMETRY RANGE: SpO2  Av.5 %  Min: 97 %  Max: 100 %  PULSE RANGE: Pulse  Av.5  Min: 76  Max: 111  BLOOD PRESSURE RANGE: Systolic (65QLX), AZJ:74 , Min:66 , KOP:117   ; Diastolic (43RIH), TREY:10, Min:50, Max:68    I/O (24Hr): Intake/Output Summary (Last 24 hours) at 2022 0905  Last data filed at 2022 0155  Gross per 24 hour   Intake 10 ml   Output --   Net 10 ml     Objective:  General Appearance: In no acute distress. Vital signs: (most recent): Blood pressure 90/61, pulse 76, temperature 97.4 °F (36.3 °C), temperature source Temporal, resp. rate 18, height 5' 11\" (1.803 m), weight 211 lb (95.7 kg), SpO2 97 %. (Orthostatic hypotension). Lungs:  Normal effort and normal respiratory rate. Breath sounds clear to auscultation. Heart: Normal rate. Regular rhythm. S1 normal and S2 normal.    Abdomen: Abdomen is soft. Bowel sounds are normal.   There is no abdominal tenderness. Extremities: Normal range of motion. Neurological: Patient is alert. (4/5 strength). Labs/Imaging/Diagnostics    Labs:  CBC:  Recent Labs     22  0557   WBC 7.1   RBC 3.40*   HGB 9.4*   HCT 29.8*   MCV 87.6   RDW 15.7*        CHEMISTRIES:  Recent Labs     22  0557      K 4.3   CL 98   CO2 25   BUN 17   CREATININE 0.6   GLUCOSE 136*     PT/INR:No results for input(s): PROTIME, INR in the last 72 hours. APTT:No results for input(s):  APTT in the last 72 hours. LIVER PROFILE:No results for input(s): AST, ALT, BILIDIR, BILITOT, ALKPHOS in the last 72 hours. Imaging Last 24 Hours:  No results found. Assessment//Plan           Hospital Problems             Last Modified POA    * (Principal) Compression fracture of L2 lumbar vertebra, closed, initial encounter ([facility] [address] 11/23/2022 Yes                    Initial Evaluation  Date: 11/24/22 AM     PM    Short Term Goals Long Term Goals    Was pt agreeable to Eval/treatment? Yes            Does pt have pain? 9/10 mid back           Bed Mobility  Rolling: Min A  Supine to sit: Mod A  Sit to supine: Mod A  Scooting: Min A     SBA Modified Independent     Transfers Sit to stand:  Mod A  Stand to sit: Mod A  Stand pivot: Min A with FWW     SBA with FWW    Modified Independent with LRAD   Ambulation    3 feet x2 reps with FWW with Min A     >100 feet with FWW with SBA >300 feet with LRAD with Modified Independent     Walking 10 feet on uneven surface NT d/t hypotension and droplet plus isolation      10 feet with FWW with Min A 10 feet with LRAD with Modified Independent     Wheel Chair Mobility NA           Car Transfers NT d/t hypotension and droplet plus isolation      Min A Modified Independent     Stair negotiation: ascended and descended  NT d/t hypotension and droplet plus isolation      4 steps with B rail with Min A 12 steps with 1 rail with Modified Independent     Curb Step:   ascended and descended NT d/t hypotension and droplet plus isolation      6 inch step with FWW and Min A 6 inch step with LRAD and Modified Independent     Picking up object off the floor NT d/t hypotension      Will  cone with Min A assist Will  cone with no assist   BLE ROM WFL           BLE Strength RLE:  Grossly 3-/5  LLE:  Grossly 4/5           Balance  Unsupported sitting:  SBA     Dynamic standing:  Min A with FWW     BBS: NT  FGA: NT          BBS: >52  FGA: >22   Date Family Teach Completed             Is additional Family Teaching Needed? Y or N              Assessment:    Condition: In stable condition. (Compression fractures  Severe orthostatic hypotension). Plan:   Encourage ambulation. (Still has significant orthostatic hypotension  She is on 15 of midodrine 3 times daily  She is on IV saline  I am going to add oral saline and Florinef  I will consult cardiology).      Electronically signed by Nathalie Abreu MD on 11/25/22 at 9:05 AM EST No

## 2023-12-25 NOTE — H&P ADULT - NSHPPHYSICALEXAM_GEN_ALL_CORE
Vital Signs Last 24 Hrs  T(C): 36.6 (25 Dec 2023 13:11), Max: 36.7 (25 Dec 2023 07:34)  T(F): 97.9 (25 Dec 2023 13:11), Max: 98 (25 Dec 2023 07:34)  HR: 78 (25 Dec 2023 13:11) (78 - 92)  BP: 145/70 (25 Dec 2023 13:11) (126/69 - 145/70)  BP(mean): --  RR: 15 (25 Dec 2023 14:06) (15 - 18)  SpO2: 98% (25 Dec 2023 14:06) (98% - 98%)    Parameters below as of 25 Dec 2023 14:06  Patient On (Oxygen Delivery Method): room air    GENERAL:  no acute distress  EYES: sclera clear, EOM intact, PERRLA, no exudates  ENMT: normocephalic, atraumatic, + DRY  mucous membranes  NECK: supple, soft  LUNGS: good air entry bilaterally, clear to auscultation, symmetric breath sounds, no wheezing or rhonchi appreciated  HEART: soft S1/S2, regular rate and rhythm, no murmurs noted, no lower extremity edema  GASTROINTESTINAL: abdomen is soft, nontender, nondistended, normoactive bowel sounds, no palpable masses  INTEGUMENT: good skin turgor, no lesions noted  MUSCULOSKELETAL: no cyanosis, clubbing, edema, calves nontender to palpation b/l  NEUROLOGIC: awake, alert, oriented x3, good muscle tone in 4 extremities, no obvious sensory deficits Vital Signs Last 24 Hrs  T(C): 36.6 (25 Dec 2023 13:11), Max: 36.7 (25 Dec 2023 07:34)  T(F): 97.9 (25 Dec 2023 13:11), Max: 98 (25 Dec 2023 07:34)  HR: 78 (25 Dec 2023 13:11) (78 - 92)  BP: 145/70 (25 Dec 2023 13:11) (126/69 - 145/70)  BP(mean): --  RR: 15 (25 Dec 2023 14:06) (15 - 18)  SpO2: 98% (25 Dec 2023 14:06) (98% - 98%)    Parameters below as of 25 Dec 2023 14:06  Patient On (Oxygen Delivery Method): room air    GENERAL:  no acute distress  EYES: sclera clear, EOM intact, PERRLA, no exudates  ENMT: normocephalic, atraumatic, + DRY  mucous membranes  NECK: supple, soft  LUNGS: good air entry bilaterally, clear to auscultation, symmetric breath sounds, no wheezing or rhonchi appreciated  HEART: soft S1/S2, regular rate and rhythm, no murmurs noted, no lower extremity edema  GASTROINTESTINAL: abdomen is soft, nontender, nondistended, normoactive bowel sounds, no palpable masses  INTEGUMENT: ++ small 2 cm scalp hematoma, TTP  MUSCULOSKELETAL: no cyanosis, clubbing, edema, calves nontender to palpation b/l  NEUROLOGIC: awake, alert, oriented x3, good muscle tone in 4 extremities, no obvious sensory deficits

## 2023-12-25 NOTE — ED ADULT TRIAGE NOTE - CHIEF COMPLAINT QUOTE
Patient A/Ox4 with clear speech. BIBA from home for syncope this AM. Struck her head against a wall. C/o headache. Denies AC therapy.

## 2023-12-25 NOTE — ED PROVIDER NOTE - CPE EDP [facility] NORM
Patient calling - says she has a bladder infection.  Symptoms include urinary frequency, urinary urgency, pain with urination and blood in urine.  Symptom started this afternoon.    No fever. No back pain.    Triaged to disposition of See PCP Within 24 Hours.   Patient does not meet criteria for Oncare visit (UTI ages 18-65).  Warm transferred patient to scheduling to set up Urgent Care telephone visit.  Care advice given per protocol.  Advised patient to call back if fever occurs, back pain occurs or symptoms worsen.    Susana Staples RN  Triage Nurse Advisor    COVID 19 Nurse Triage Plan/Patient Instructions    Please be aware that novel coronavirus (COVID-19) may be circulating in the community. If you develop symptoms such as fever, cough, or SOB or if you have concerns about the presence of another infection including coronavirus (COVID-19), please contact your health care provider or visit [email or web address].     Disposition/Instructions    Virtual Visit with provider recommended. Reference Visit Selection Guide.    Thank you for taking steps to prevent the spread of this virus.  o Limit your contact with others.  o Wear a simple mask to cover your cough.  o Wash your hands well and often.    Resources    [facility]: About COVID-19: [email or web address]    CDC: What to Do If You're Sick: www.cdc.gov/coronavirus/2019-ncov/about/steps-when-sick.html    CDC: Ending Home Isolation: www.cdc.gov/coronavirus/2019-ncov/hcp/disposition-in-home-patients.html     CDC: Caring for Someone: www.cdc.gov/coronavirus/2019-ncov/if-you-are-sick/care-for-someone.html     [facility]: Interim Guidance for Hospital Discharge to Home: www.health.[facility].[place]/diseases/coronavirus/hcp/hospdischarge.pdf    [facility] clinical trials (COVID-19 research studies): [email or web address]-trials     Below are the COVID-19 hotlines at the [place] [facility] ([facility]). Interpreters are available.    o For health questions: Call 706-801-2390 or 1-928.932.6331 (7 a.m. to 7 p.m.)  o For questions about schools and childcare: Call 208-093-7296 or 1-283.301.6553 (7 a.m. to 7 p.m.)     Additional Information    [1] Discomfort (pain, burning or stinging) when passing urine AND [2] female    Negative: Shock suspected (e.g., cold/pale/clammy skin, too weak to stand, low BP, rapid pulse)    Negative: Sounds like a life-threatening emergency to the triager    Negative: Followed a genital area injury    Negative: Taking antibiotic for urinary tract infection (UTI)    Negative: Pregnant    Negative: Postpartum (from 0 to 6 weeks after delivery)    Negative: [1] Unable to urinate (or only a few drops) > 4 hours AND [2] bladder feels very full (e.g., palpable bladder or strong urge to urinate)    Negative: Vomiting    Negative: Patient sounds very sick or weak to the triager    Negative: Fever > 100.5 F (38.1 C)    Negative: Side (flank) or lower back pain present    Negative: [1] SEVERE pain with urination  (e.g., excruciating) AND [2] not improved after 2 hours of pain medicine and Sitz bath    Negative: Diabetes mellitus or weak immune system (e.g., HIV positive, cancer chemo, splenectomy, organ transplant, chronic steroids)    Negative: Bedridden (e.g., nursing home patient, CVA, chronic illness, recovering from surgery)    Negative: Artificial heart valve or artificial joint    Blood in urine (red, pink, or tea-colored)    Protocols used: URINARY SYMPTOMS-A-AH, URINATION PAIN - FEMALE-A-AH       normal...

## 2023-12-25 NOTE — H&P ADULT - NSHPSOCIALHISTORY_GEN_ALL_CORE
Tobacco:  PPD/ years, quit   years ago   EtOH:  glass of    daily   Recreational drug use:   Lives with:   Ambulates: Tobacco:  never  EtOH:  denies  Recreational drug use: denies  Lives with:   Ambulates: w/o assistance  ADLS: w/o assistance

## 2023-12-25 NOTE — CONSULT NOTE ADULT - NS ATTEND AMEND GEN_ALL_CORE FT
Generally healthy 88 year old female with a history of palpitations here with syncope    - event presumably hypotension/vagal mediated, and was followed by need to go to the bathroom  - has felt somewhat unwell, and  fighting a GI illness  - also has been running around as of late, and not eating/drinking normally  - ekg without worrisome findings  - troponin mildly elevated, though no suggestion of acs  - check orthostatics and RVP  - if 2nd troponin similar, hopefully can dc home, with plan to follow up with Dr. Vila and outpt monitor. If trend not favorable, will need to admit for telemetry, echocardiogram, syncope work up

## 2023-12-25 NOTE — ED PROVIDER NOTE - CLINICAL SUMMARY MEDICAL DECISION MAKING FREE TEXT BOX
Patient brought in by EMS status post syncopal episode at home.  Patient relates she was in her kitchen when she suddenly passed out woke up on the floor having a headache after hit her head.  Patient denies nausea vomiting weakness numbness neck pain back pain arm pain leg pain chest pain shortness of breath abdominal pain or any other complaints.  PMD Felipe cardiologist lacie    Plan EKG CT head C-spine chest x-ray labs IV fluid Ofirmev cardiology consult    Differential including but not limited to MI arrhythmia ICH fracture electrolyte abnormality dehydration anemia

## 2023-12-25 NOTE — ED PROVIDER NOTE - DIFFERENTIAL DIAGNOSIS
Differential Diagnosis Differential including but not limited to MI arrhythmia ICH fracture electrolyte abnormality dehydration anemia

## 2023-12-25 NOTE — H&P ADULT - HISTORY OF PRESENT ILLNESS
88 year-old F with PMHx  of supraventricular ectopy, palpitations, dyspnea, mild aortic insufficiency, mild carotid atherosclerosis, HLD, celiac disease, a hepatic cyst, a R kidney cyst, a cystocele, left thyroid nodules, osteoporosis, urinary stress incontinence (pessary) and a left femur stress hairline fracture (thought to be related to therapy with Fosamax or Prolia - status post ORIF 7/18/2018), brought in by EMS status post syncopal episode at home.  Patient relates she was in her kitchen when she suddenly passed out woke up on the floor having a headache after hit her head.  Patient denies nausea vomiting weakness numbness neck pain back pain arm pain leg pain chest pain shortness of breath abdominal pain or any other complaints.      IN ED   VITALS /69 HR 92 RR 18  temp 98F  on RA sat >98%   PERTINENT LABS: trop 248.9 --> 405.1  K 3.2  Albumin 3.1   CT HEAD and C spine non con: Stable head CT.  No acute fractures or dislocations involving the cervical spine..  EKG: NSR hr 77bpm  No acute st changes   s/p OFIRMEV 700mg IV, KCL 40mg Oral X1, 1 L NS in ER    88 year-old F with PMHx  of supraventricular ectopy, palpitations, dyspnea, mild aortic insufficiency, mild carotid atherosclerosis, HLD, celiac disease, a hepatic cyst, a R kidney cyst, a cystocele, left thyroid nodules, osteoporosis, urinary stress incontinence (pessary) and a left femur stress hairline fracture (thought to be related to therapy with Fosamax or Prolia - status post ORIF 7/18/2018), brought in by EMS status post syncopal episode at home.  Patient relates she was in her kitchen making breakfast when she suddenly passed out. Patient states she hit the back of her head on the floor and had a headache afterwards. Patient states she did had been working hard running errands over the past few days. The patient denies eating or drinking for a long time prior to the event. Patient states she has an "regularly irregular rhythm" in the past as per her outpatient cardiologist. Patient denies alcohol use prior to the fall, normally is able to complete all ADLS and ambulate without assistance. Patient denies nausea, vomiting, weakness, numbness, neck pain, back pain, SOB, chest pain.       IN ED   VITALS /69 HR 92 RR 18  temp 98F  on RA sat >98%   PERTINENT LABS: trop 248.9 --> 405.1  K 3.2  Albumin 3.1   CT HEAD and C spine non con: Stable head CT.  No acute fractures or dislocations involving the cervical spine..  EKG: NSR hr 77bpm  No acute st changes   s/p OFIRMEV 700mg IV, KCL 40mg Oral X1, 1 L NS in ER

## 2023-12-25 NOTE — H&P ADULT - ASSESSMENT
88 year-old F with PMHx  of supraventricular ectopy, palpitations, dyspnea, mild aortic insufficiency, mild carotid atherosclerosis, HLD, celiac disease, a hepatic cyst, a R kidney cyst, a cystocele, left thyroid nodules, osteoporosis, urinary stress incontinence (pessary) and a left femur stress hairline fracture (thought to be related to therapy with Fosamax or Prolia - status post ORIF 7/18/2018), brought in by EMS status post syncopal episode at home. Admitted for syncope and elevated trop work up

## 2023-12-25 NOTE — H&P ADULT - PROBLEM SELECTOR PLAN 4
continue home /70 in ED  - no history of HTN, likely 2/2 to stress/anxiety  - monitor hemodynamic status and address accordingly

## 2023-12-25 NOTE — ED ADULT NURSE NOTE - NSFALLHARMRISKINTERV_ED_ALL_ED
Assistance OOB with selected safe patient handling equipment if applicable/Communicate risk of Fall with Harm to all staff, patient, and family/Orthostatic vital signs/Provide visual cue: red socks, yellow wristband, yellow gown, etc/Reinforce activity limits and safety measures with patient and family/Bed in lowest position, wheels locked, appropriate side rails in place/Call bell, personal items and telephone in reach/Instruct patient to call for assistance before getting out of bed/chair/stretcher/Non-slip footwear applied when patient is off stretcher/[place] to call system/Physically safe environment - no spills, clutter or unnecessary equipment/Purposeful Proactive Rounding/Room/bathroom lighting operational, light cord in reach Assistance OOB with selected safe patient handling equipment if applicable/Communicate risk of Fall with Harm to all staff, patient, and family/Orthostatic vital signs/Provide visual cue: red socks, yellow wristband, yellow gown, etc/Reinforce activity limits and safety measures with patient and family/Bed in lowest position, wheels locked, appropriate side rails in place/Call bell, personal items and telephone in reach/Instruct patient to call for assistance before getting out of bed/chair/stretcher/Non-slip footwear applied when patient is off stretcher/[place] to call system/Physically safe environment - no spills, clutter or unnecessary equipment/Purposeful Proactive Rounding/Room/bathroom lighting operational, light cord in reach

## 2023-12-25 NOTE — CONSULT NOTE ADULT - SUBJECTIVE AND OBJECTIVE BOX
DOCUMENTATION IN PROGRESS    CHIEF COMPLAINT: Patient is a 88y old  Female who presents with a chief complaint of     HPI: This is a 88 year-old female with a history of supraventricular ectopy, palpitations, dyspnea, mild aortic insufficiency, mild carotid atherosclerosis, a dyslipidemia, celiac disease, a hepatic cyst, a right kidney cyst, a cystocele, left thyroid nodules, osteoporosis, urinary stress incontinence (pessary), a uterine polyp, tonsillectomy, and a left femur stress hairline fracture (thought to be related to therapy with Fosamax or Prolia - status post ORIF 7/18/2018), brought in by EMS status post syncopal episode at home.  Patient relates she was in her kitchen when she suddenly passed out woke up on the floor having a headache after hit her head.  Patient denies nausea vomiting weakness numbness neck pain back pain arm pain leg pain chest pain shortness of breath abdominal pain or any other complaints.    EKG: SR with non specific STA     REVIEW OF SYSTEMS:   All other review of systems are negative unless indicated above    PAST MEDICAL & SURGICAL HISTORY:  Arthritis      Osteoporosis      Prolapsed uterus      Celiac sprue      Elective surgery  pessary placed  as outpatient  2016      H/O major orthopedic surgery  kell placed due to fx          SOCIAL HISTORY:  No tobacco, ethanol, or drug abuse.    FAMILY HISTORY:    No family history of acute MI or sudden cardiac death.    MEDICATIONS  (STANDING):    MEDICATIONS  (PRN):      Allergies    Gluten (Flatulence; Other; Diarrhea)  No Known Drug Allergies    Intolerances        Home meds:  Home Medications:  omeprazole 40 mg oral delayed release capsule: 1 cap(s) orally once a day (18 Jul 2018 08:23)  Prolia 60 mg/mL subcutaneous solution: 2 X year  (18 Jul 2018 08:23)  Vagifem 10 mcg vaginal tablet: 1 tab(s) vaginal 2 times a week (18 Jul 2018 08:23)        VITAL SIGNS:   Vital Signs Last 24 Hrs  T(C): 36.7 (25 Dec 2023 07:34), Max: 36.7 (25 Dec 2023 07:34)  T(F): 98 (25 Dec 2023 07:34), Max: 98 (25 Dec 2023 07:34)  HR: 92 (25 Dec 2023 07:34) (92 - 92)  BP: 126/69 (25 Dec 2023 07:34) (126/69 - 126/69)  BP(mean): --  RR: 18 (25 Dec 2023 07:34) (18 - 18)  SpO2: 98% (25 Dec 2023 07:34) (98% - 98%)    Parameters below as of 25 Dec 2023 07:34  Patient On (Oxygen Delivery Method): room air        I&O's Summary      On Exam:  TELE: Not on telemetry   Constitutional: NAD, awake and alert  HEENT: Moist Mucous Membranes, Anicteric  Pulmonary: Non-labored, breath sounds are clear bilaterally, No wheezing, rales or rhonchi  Cardiovascular: Regular, S1 and S2, No murmurs, rubs, gallops or clicks  Gastrointestinal: Bowel Sounds present, soft, nontender.   Lymph: No peripheral edema. No lymphadenopathy.  Skin: No visible rashes or ulcers.  Psych:  Mood & affect appropriate for situation    LABS: All Labs Reviewed:                        14.2   4.67  )-----------( 317      ( 25 Dec 2023 09:35 )             42.2     25 Dec 2023 09:35    144    |  109    |  13     ----------------------------<  78     3.2     |  29     |  0.58     Ca    8.9        25 Dec 2023 09:35    TPro  6.2    /  Alb  3.1    /  TBili  0.5    /  DBili  x      /  AST  28     /  ALT  28     /  AlkPhos  66     25 Dec 2023 09:35          CHIEF COMPLAINT: Patient is a 88y old  Female who presents with a chief complaint of     HPI: This is a 88 year-old female with a history of supraventricular ectopy, palpitations, dyspnea, mild aortic insufficiency, mild carotid atherosclerosis, a dyslipidemia, celiac disease, a hepatic cyst, a right kidney cyst, a cystocele, left thyroid nodules, osteoporosis, urinary stress incontinence (pessary), a uterine polyp, tonsillectomy, and a left femur stress hairline fracture (thought to be related to therapy with Fosamax or Prolia - status post ORIF 7/18/2018), brought in by EMS status post syncopal episode at home.  Patient relates she was in her kitchen when she suddenly passed out woke up on the floor having a headache after hit her head.  Patient denies nausea vomiting weakness numbness neck pain back pain arm pain leg pain chest pain shortness of breath abdominal pain or any other complaints.    EKG: SR with non specific STA     REVIEW OF SYSTEMS:   All other review of systems are negative unless indicated above    PAST MEDICAL & SURGICAL HISTORY:  Arthritis      Osteoporosis      Prolapsed uterus      Celiac sprue      Elective surgery  pessary placed  as outpatient  2016      H/O major orthopedic surgery  kell placed due to fx          SOCIAL HISTORY:  No tobacco, ethanol, or drug abuse.    FAMILY HISTORY:    No family history of acute MI or sudden cardiac death.    MEDICATIONS  (STANDING):    MEDICATIONS  (PRN):      Allergies    Gluten (Flatulence; Other; Diarrhea)  No Known Drug Allergies    Intolerances        Home meds:  Home Medications:  omeprazole 40 mg oral delayed release capsule: 1 cap(s) orally once a day (18 Jul 2018 08:23)  Prolia 60 mg/mL subcutaneous solution: 2 X year  (18 Jul 2018 08:23)  Vagifem 10 mcg vaginal tablet: 1 tab(s) vaginal 2 times a week (18 Jul 2018 08:23)        VITAL SIGNS:   Vital Signs Last 24 Hrs  T(C): 36.7 (25 Dec 2023 07:34), Max: 36.7 (25 Dec 2023 07:34)  T(F): 98 (25 Dec 2023 07:34), Max: 98 (25 Dec 2023 07:34)  HR: 92 (25 Dec 2023 07:34) (92 - 92)  BP: 126/69 (25 Dec 2023 07:34) (126/69 - 126/69)  BP(mean): --  RR: 18 (25 Dec 2023 07:34) (18 - 18)  SpO2: 98% (25 Dec 2023 07:34) (98% - 98%)    Parameters below as of 25 Dec 2023 07:34  Patient On (Oxygen Delivery Method): room air        I&O's Summary      On Exam:  TELE: Not on telemetry   Constitutional: NAD, awake and alert  HEENT: Moist Mucous Membranes, Anicteric  Pulmonary: Non-labored, breath sounds are clear bilaterally, No wheezing, rales or rhonchi  Cardiovascular: Regular, S1 and S2, No murmurs, rubs, gallops or clicks  Gastrointestinal: Bowel Sounds present, soft, nontender.   Lymph: No peripheral edema. No lymphadenopathy.  Skin: No visible rashes or ulcers.  Psych:  Mood & affect appropriate for situation    LABS: All Labs Reviewed:                        14.2   4.67  )-----------( 317      ( 25 Dec 2023 09:35 )             42.2     25 Dec 2023 09:35    144    |  109    |  13     ----------------------------<  78     3.2     |  29     |  0.58     Ca    8.9        25 Dec 2023 09:35    TPro  6.2    /  Alb  3.1    /  TBili  0.5    /  DBili  x      /  AST  28     /  ALT  28     /  AlkPhos  66     25 Dec 2023 09:35

## 2023-12-26 ENCOUNTER — TRANSCRIPTION ENCOUNTER (OUTPATIENT)
Age: 88
End: 2023-12-26

## 2023-12-26 VITALS
DIASTOLIC BLOOD PRESSURE: 73 MMHG | HEART RATE: 70 BPM | TEMPERATURE: 98 F | OXYGEN SATURATION: 97 % | SYSTOLIC BLOOD PRESSURE: 153 MMHG | RESPIRATION RATE: 18 BRPM

## 2023-12-26 LAB
A1C WITH ESTIMATED AVERAGE GLUCOSE RESULT: 5.4 % — SIGNIFICANT CHANGE UP (ref 4–5.6)
A1C WITH ESTIMATED AVERAGE GLUCOSE RESULT: 5.4 % — SIGNIFICANT CHANGE UP (ref 4–5.6)
ANION GAP SERPL CALC-SCNC: 4 MMOL/L — LOW (ref 5–17)
ANION GAP SERPL CALC-SCNC: 4 MMOL/L — LOW (ref 5–17)
ANION GAP SERPL CALC-SCNC: 6 MMOL/L — SIGNIFICANT CHANGE UP (ref 5–17)
ANION GAP SERPL CALC-SCNC: 6 MMOL/L — SIGNIFICANT CHANGE UP (ref 5–17)
BASOPHILS # BLD AUTO: 0.06 K/UL — SIGNIFICANT CHANGE UP (ref 0–0.2)
BASOPHILS # BLD AUTO: 0.06 K/UL — SIGNIFICANT CHANGE UP (ref 0–0.2)
BASOPHILS NFR BLD AUTO: 1.4 % — SIGNIFICANT CHANGE UP (ref 0–2)
BASOPHILS NFR BLD AUTO: 1.4 % — SIGNIFICANT CHANGE UP (ref 0–2)
BUN SERPL-MCNC: 12 MG/DL — SIGNIFICANT CHANGE UP (ref 7–23)
BUN SERPL-MCNC: 12 MG/DL — SIGNIFICANT CHANGE UP (ref 7–23)
BUN SERPL-MCNC: 13 MG/DL — SIGNIFICANT CHANGE UP (ref 7–23)
BUN SERPL-MCNC: 13 MG/DL — SIGNIFICANT CHANGE UP (ref 7–23)
CALCIUM SERPL-MCNC: 8.5 MG/DL — SIGNIFICANT CHANGE UP (ref 8.5–10.1)
CALCIUM SERPL-MCNC: 8.5 MG/DL — SIGNIFICANT CHANGE UP (ref 8.5–10.1)
CALCIUM SERPL-MCNC: 8.6 MG/DL — SIGNIFICANT CHANGE UP (ref 8.5–10.1)
CALCIUM SERPL-MCNC: 8.6 MG/DL — SIGNIFICANT CHANGE UP (ref 8.5–10.1)
CHLORIDE SERPL-SCNC: 112 MMOL/L — HIGH (ref 96–108)
CHLORIDE SERPL-SCNC: 112 MMOL/L — HIGH (ref 96–108)
CHLORIDE SERPL-SCNC: 113 MMOL/L — HIGH (ref 96–108)
CHLORIDE SERPL-SCNC: 113 MMOL/L — HIGH (ref 96–108)
CHOLEST SERPL-MCNC: 144 MG/DL — SIGNIFICANT CHANGE UP
CHOLEST SERPL-MCNC: 144 MG/DL — SIGNIFICANT CHANGE UP
CO2 SERPL-SCNC: 23 MMOL/L — SIGNIFICANT CHANGE UP (ref 22–31)
CO2 SERPL-SCNC: 23 MMOL/L — SIGNIFICANT CHANGE UP (ref 22–31)
CO2 SERPL-SCNC: 28 MMOL/L — SIGNIFICANT CHANGE UP (ref 22–31)
CO2 SERPL-SCNC: 28 MMOL/L — SIGNIFICANT CHANGE UP (ref 22–31)
CREAT SERPL-MCNC: 0.65 MG/DL — SIGNIFICANT CHANGE UP (ref 0.5–1.3)
CREAT SERPL-MCNC: 0.65 MG/DL — SIGNIFICANT CHANGE UP (ref 0.5–1.3)
CREAT SERPL-MCNC: 0.68 MG/DL — SIGNIFICANT CHANGE UP (ref 0.5–1.3)
CREAT SERPL-MCNC: 0.68 MG/DL — SIGNIFICANT CHANGE UP (ref 0.5–1.3)
EGFR: 84 ML/MIN/1.73M2 — SIGNIFICANT CHANGE UP
EGFR: 84 ML/MIN/1.73M2 — SIGNIFICANT CHANGE UP
EGFR: 85 ML/MIN/1.73M2 — SIGNIFICANT CHANGE UP
EGFR: 85 ML/MIN/1.73M2 — SIGNIFICANT CHANGE UP
EOSINOPHIL # BLD AUTO: 0.11 K/UL — SIGNIFICANT CHANGE UP (ref 0–0.5)
EOSINOPHIL # BLD AUTO: 0.11 K/UL — SIGNIFICANT CHANGE UP (ref 0–0.5)
EOSINOPHIL NFR BLD AUTO: 2.6 % — SIGNIFICANT CHANGE UP (ref 0–6)
EOSINOPHIL NFR BLD AUTO: 2.6 % — SIGNIFICANT CHANGE UP (ref 0–6)
ESTIMATED AVERAGE GLUCOSE: 108 MG/DL — SIGNIFICANT CHANGE UP (ref 68–114)
ESTIMATED AVERAGE GLUCOSE: 108 MG/DL — SIGNIFICANT CHANGE UP (ref 68–114)
GLUCOSE SERPL-MCNC: 105 MG/DL — HIGH (ref 70–99)
GLUCOSE SERPL-MCNC: 105 MG/DL — HIGH (ref 70–99)
GLUCOSE SERPL-MCNC: 94 MG/DL — SIGNIFICANT CHANGE UP (ref 70–99)
GLUCOSE SERPL-MCNC: 94 MG/DL — SIGNIFICANT CHANGE UP (ref 70–99)
HCT VFR BLD CALC: 38.6 % — SIGNIFICANT CHANGE UP (ref 34.5–45)
HCT VFR BLD CALC: 38.6 % — SIGNIFICANT CHANGE UP (ref 34.5–45)
HDLC SERPL-MCNC: 57 MG/DL — SIGNIFICANT CHANGE UP
HDLC SERPL-MCNC: 57 MG/DL — SIGNIFICANT CHANGE UP
HGB BLD-MCNC: 12.8 G/DL — SIGNIFICANT CHANGE UP (ref 11.5–15.5)
HGB BLD-MCNC: 12.8 G/DL — SIGNIFICANT CHANGE UP (ref 11.5–15.5)
IMM GRANULOCYTES NFR BLD AUTO: 0.2 % — SIGNIFICANT CHANGE UP (ref 0–0.9)
IMM GRANULOCYTES NFR BLD AUTO: 0.2 % — SIGNIFICANT CHANGE UP (ref 0–0.9)
LIPID PNL WITH DIRECT LDL SERPL: 75 MG/DL — SIGNIFICANT CHANGE UP
LIPID PNL WITH DIRECT LDL SERPL: 75 MG/DL — SIGNIFICANT CHANGE UP
LYMPHOCYTES # BLD AUTO: 1.31 K/UL — SIGNIFICANT CHANGE UP (ref 1–3.3)
LYMPHOCYTES # BLD AUTO: 1.31 K/UL — SIGNIFICANT CHANGE UP (ref 1–3.3)
LYMPHOCYTES # BLD AUTO: 30.9 % — SIGNIFICANT CHANGE UP (ref 13–44)
LYMPHOCYTES # BLD AUTO: 30.9 % — SIGNIFICANT CHANGE UP (ref 13–44)
MCHC RBC-ENTMCNC: 31.4 PG — SIGNIFICANT CHANGE UP (ref 27–34)
MCHC RBC-ENTMCNC: 31.4 PG — SIGNIFICANT CHANGE UP (ref 27–34)
MCHC RBC-ENTMCNC: 33.2 GM/DL — SIGNIFICANT CHANGE UP (ref 32–36)
MCHC RBC-ENTMCNC: 33.2 GM/DL — SIGNIFICANT CHANGE UP (ref 32–36)
MCV RBC AUTO: 94.6 FL — SIGNIFICANT CHANGE UP (ref 80–100)
MCV RBC AUTO: 94.6 FL — SIGNIFICANT CHANGE UP (ref 80–100)
MONOCYTES # BLD AUTO: 0.7 K/UL — SIGNIFICANT CHANGE UP (ref 0–0.9)
MONOCYTES # BLD AUTO: 0.7 K/UL — SIGNIFICANT CHANGE UP (ref 0–0.9)
MONOCYTES NFR BLD AUTO: 16.5 % — HIGH (ref 2–14)
MONOCYTES NFR BLD AUTO: 16.5 % — HIGH (ref 2–14)
NEUTROPHILS # BLD AUTO: 2.05 K/UL — SIGNIFICANT CHANGE UP (ref 1.8–7.4)
NEUTROPHILS # BLD AUTO: 2.05 K/UL — SIGNIFICANT CHANGE UP (ref 1.8–7.4)
NEUTROPHILS NFR BLD AUTO: 48.4 % — SIGNIFICANT CHANGE UP (ref 43–77)
NEUTROPHILS NFR BLD AUTO: 48.4 % — SIGNIFICANT CHANGE UP (ref 43–77)
NON HDL CHOLESTEROL: 87 MG/DL — SIGNIFICANT CHANGE UP
NON HDL CHOLESTEROL: 87 MG/DL — SIGNIFICANT CHANGE UP
NRBC # BLD: 0 /100 WBCS — SIGNIFICANT CHANGE UP (ref 0–0)
NRBC # BLD: 0 /100 WBCS — SIGNIFICANT CHANGE UP (ref 0–0)
PLATELET # BLD AUTO: 298 K/UL — SIGNIFICANT CHANGE UP (ref 150–400)
PLATELET # BLD AUTO: 298 K/UL — SIGNIFICANT CHANGE UP (ref 150–400)
POTASSIUM SERPL-MCNC: 3.4 MMOL/L — LOW (ref 3.5–5.3)
POTASSIUM SERPL-MCNC: 3.4 MMOL/L — LOW (ref 3.5–5.3)
POTASSIUM SERPL-MCNC: 3.6 MMOL/L — SIGNIFICANT CHANGE UP (ref 3.5–5.3)
POTASSIUM SERPL-MCNC: 3.6 MMOL/L — SIGNIFICANT CHANGE UP (ref 3.5–5.3)
POTASSIUM SERPL-SCNC: 3.4 MMOL/L — LOW (ref 3.5–5.3)
POTASSIUM SERPL-SCNC: 3.4 MMOL/L — LOW (ref 3.5–5.3)
POTASSIUM SERPL-SCNC: 3.6 MMOL/L — SIGNIFICANT CHANGE UP (ref 3.5–5.3)
POTASSIUM SERPL-SCNC: 3.6 MMOL/L — SIGNIFICANT CHANGE UP (ref 3.5–5.3)
RBC # BLD: 4.08 M/UL — SIGNIFICANT CHANGE UP (ref 3.8–5.2)
RBC # BLD: 4.08 M/UL — SIGNIFICANT CHANGE UP (ref 3.8–5.2)
RBC # FLD: 13.1 % — SIGNIFICANT CHANGE UP (ref 10.3–14.5)
RBC # FLD: 13.1 % — SIGNIFICANT CHANGE UP (ref 10.3–14.5)
SODIUM SERPL-SCNC: 142 MMOL/L — SIGNIFICANT CHANGE UP (ref 135–145)
SODIUM SERPL-SCNC: 142 MMOL/L — SIGNIFICANT CHANGE UP (ref 135–145)
SODIUM SERPL-SCNC: 144 MMOL/L — SIGNIFICANT CHANGE UP (ref 135–145)
SODIUM SERPL-SCNC: 144 MMOL/L — SIGNIFICANT CHANGE UP (ref 135–145)
TRIGL SERPL-MCNC: 56 MG/DL — SIGNIFICANT CHANGE UP
TRIGL SERPL-MCNC: 56 MG/DL — SIGNIFICANT CHANGE UP
WBC # BLD: 4.24 K/UL — SIGNIFICANT CHANGE UP (ref 3.8–10.5)
WBC # BLD: 4.24 K/UL — SIGNIFICANT CHANGE UP (ref 3.8–10.5)
WBC # FLD AUTO: 4.24 K/UL — SIGNIFICANT CHANGE UP (ref 3.8–10.5)
WBC # FLD AUTO: 4.24 K/UL — SIGNIFICANT CHANGE UP (ref 3.8–10.5)

## 2023-12-26 PROCEDURE — 97161 PT EVAL LOW COMPLEX 20 MIN: CPT

## 2023-12-26 PROCEDURE — 80048 BASIC METABOLIC PNL TOTAL CA: CPT

## 2023-12-26 PROCEDURE — 84484 ASSAY OF TROPONIN QUANT: CPT

## 2023-12-26 PROCEDURE — 83036 HEMOGLOBIN GLYCOSYLATED A1C: CPT

## 2023-12-26 PROCEDURE — 99239 HOSP IP/OBS DSCHRG MGMT >30: CPT

## 2023-12-26 PROCEDURE — 72125 CT NECK SPINE W/O DYE: CPT | Mod: MA

## 2023-12-26 PROCEDURE — 99285 EMERGENCY DEPT VISIT HI MDM: CPT

## 2023-12-26 PROCEDURE — 71045 X-RAY EXAM CHEST 1 VIEW: CPT

## 2023-12-26 PROCEDURE — 82553 CREATINE MB FRACTION: CPT

## 2023-12-26 PROCEDURE — 85025 COMPLETE CBC W/AUTO DIFF WBC: CPT

## 2023-12-26 PROCEDURE — 80061 LIPID PANEL: CPT

## 2023-12-26 PROCEDURE — 93306 TTE W/DOPPLER COMPLETE: CPT | Mod: 26

## 2023-12-26 PROCEDURE — 0225U NFCT DS DNA&RNA 21 SARSCOV2: CPT

## 2023-12-26 PROCEDURE — 93005 ELECTROCARDIOGRAM TRACING: CPT

## 2023-12-26 PROCEDURE — 97165 OT EVAL LOW COMPLEX 30 MIN: CPT

## 2023-12-26 PROCEDURE — 82550 ASSAY OF CK (CPK): CPT

## 2023-12-26 PROCEDURE — 80053 COMPREHEN METABOLIC PANEL: CPT

## 2023-12-26 PROCEDURE — 36415 COLL VENOUS BLD VENIPUNCTURE: CPT

## 2023-12-26 PROCEDURE — 93306 TTE W/DOPPLER COMPLETE: CPT

## 2023-12-26 PROCEDURE — 93880 EXTRACRANIAL BILAT STUDY: CPT

## 2023-12-26 PROCEDURE — 94640 AIRWAY INHALATION TREATMENT: CPT

## 2023-12-26 PROCEDURE — 70450 CT HEAD/BRAIN W/O DYE: CPT | Mod: MA

## 2023-12-26 PROCEDURE — 96374 THER/PROPH/DIAG INJ IV PUSH: CPT

## 2023-12-26 PROCEDURE — 99233 SBSQ HOSP IP/OBS HIGH 50: CPT

## 2023-12-26 RX ORDER — ACETAMINOPHEN 500 MG
2 TABLET ORAL
Qty: 0 | Refills: 0 | DISCHARGE
Start: 2023-12-26

## 2023-12-26 RX ADMIN — Medication 1 SPRAY(S): at 06:41

## 2023-12-26 RX ADMIN — PANTOPRAZOLE SODIUM 40 MILLIGRAM(S): 20 TABLET, DELAYED RELEASE ORAL at 06:41

## 2023-12-26 NOTE — PROGRESS NOTE ADULT - PROBLEM SELECTOR PLAN 1
-  likely hypotension/vagal mediated vs dehydration   - EKG: NSR hr 77bpm  No acute st changes   - Troponin elevated, though no suggestion of acs  - orthostatics and RVP performed  - Follow up TTE  - carotid dopplers performed  - Maintenance IVF NS @ 60cc/h   - Seen by cardio Dr Ortega, following recommendations appreciated- cleared for dc with close outpatient follow up with Dr Vila

## 2023-12-26 NOTE — CARE COORDINATION ASSESSMENT. - NSPASTMEDSURGHISTORY_GEN_ALL_CORE_FT
PAST MEDICAL & SURGICAL HISTORY:  Celiac sprue      Prolapsed uterus      Osteoporosis      Arthritis      Elective surgery  pessary placed  as outpatient  2016      H/O major orthopedic surgery  kell placed due to fx

## 2023-12-26 NOTE — OCCUPATIONAL THERAPY INITIAL EVALUATION ADULT - RANGE OF MOTION EXAMINATION, UPPER EXTREMITY
BUE FMC grossly WFL- observed during ADLs/bilateral UE Active ROM was WFL  (within functional limits)

## 2023-12-26 NOTE — PROGRESS NOTE ADULT - NS ATTEND AMEND GEN_ALL_CORE FT
Generally healthy 88 year old female with a history of palpitations here with syncope    - event presumably hypotension/vagal mediated, and was followed by need to go to the bathroom  - has felt somewhat unwell, and  fighting a GI illness  - also has been running around as of late, and not eating/drinking normally  - troponin mildly elevated, though no suggestion of acs  - check orthostatics  - Trops flat, no need to trend further.   - TTE pending.

## 2023-12-26 NOTE — OCCUPATIONAL THERAPY INITIAL EVALUATION ADULT - ADDITIONAL COMMENTS
Pt reports she lives with her  in a private home with 3 steps to enter (+) rail; bedroom/bathroom with tub (+grab bars) on 1 level. Laundry is downstairs. PTA pt was independent with ADLs/IADLs and functional mobility without AD. (+) .

## 2023-12-26 NOTE — PHYSICAL THERAPY INITIAL EVALUATION ADULT - PERTINENT HX OF CURRENT PROBLEM, REHAB EVAL
As per EMR pt is an "88 year-old F with PMHx of supraventricular ectopy, palpitations, dyspnea, mild aortic insufficiency, mild carotid atherosclerosis, HLD, celiac disease, a hepatic cyst, a R kidney cyst, a cystocele, left thyroid nodules, osteoporosis, urinary stress incontinence (pessary) and a left femur stress hairline fracture (thought to be related to therapy with Fosamax or Prolia - status post ORIF 7/18/2018), brought in by EMS status post syncopal episode at home. Admitted for syncope and elevated trop work up."

## 2023-12-26 NOTE — PROGRESS NOTE ADULT - ASSESSMENT
88 year-old female with a history of supraventricular ectopy, palpitations, dyspnea, mild aortic insufficiency, mild carotid atherosclerosis, a dyslipidemia, celiac disease, a hepatic cyst, a right kidney cyst, a cystocele, left thyroid nodules, osteoporosis, urinary stress incontinence (pessary) brought in by EMS status post syncopal episode at home.    Syncope, elev trops   - Possibly increased vagal tone from needing to go to bathroom and change of positions lead to transient hypotension/syncope  - check orthostatic vital signs. s/p IVF in ED  - CTH neg   - no sign of volume overload  - likely more dehydrated per patient as she has not been drinking water as she used to   - encourage fluid intake     - EKG: SR with non specific STA   - No clear evidence of acute ischemia.   - trops peaked; 405>346. stop trending.   - no anginal complaints   - continue statin  - TTE pending    - plan to follow up with Dr. Vila for outpatient monitor  - Monitor and replete lytes, keep K>4, Mg>2.  - Will continue to follow.    Nora Brown NP  Nurse Practitioner- Cardiology   Call TEAMS
88 year-old F with PMHx  of supraventricular ectopy, palpitations, dyspnea, mild aortic insufficiency, mild carotid atherosclerosis, HLD, celiac disease, a hepatic cyst, a R kidney cyst, a cystocele, left thyroid nodules, osteoporosis, urinary stress incontinence (pessary) and a left femur stress hairline fracture (thought to be related to therapy with Fosamax or Prolia - status post ORIF 7/18/2018), brought in by EMS status post syncopal episode at home. Admitted for syncope and elevated trop work up

## 2023-12-26 NOTE — CARE COORDINATION ASSESSMENT. - NSCAREPROVIDERS_GEN_ALL_CORE_FT
CARE PROVIDERS:  Accepting Physician: Cheko Price  Administration: Donny Stokes  Administration: Seun Covington  Administration: Osman Cancino  Administration: Donaldo Hawkins  Administration: Cassie Guzmán  Administration: Jonelle Alejandre  Admitting: Cheko Price  Attending: Cheko Price  Cardiology Technician: Leslye Ramírez  Consultant: Radha Bautista  Consultant: Nora Brown  Consultant: Theo Ortega  Consultant: Patti Morataya  ED Attending: Fito Becker  ED Nurse: Krystin Tinoco  HIM/Billing & Coding: Princess Liriano  Nurse: Shelia Salinas  Nurse: Justice Sol  Occupational Therapy: Rica Agosto  Ordered: ServiceAccount, SCMMLM  Ordered: ADM, User  Override: Tamika Zimmer  Override: Justice Sol  PCA/Nursing Assistant: Oksana Steve  Physical Therapy: Danny Alba  Primary Team: Zayra Mancia  Primary Team: Angie Gar  Primary Team: Shavon Boateng  Primary Team: Velma Tenorio  Primary Team: Zac Hernandez  Respiratory Therapy: Arielle Levy  : Cami Whitman  : Marisol Helms  Team: TERENCE  Hospitalists, Team  UR// Supp. Assoc.: Henry Giron   CARE PROVIDERS:  Accepting Physician: Cheko Price  Administration: Donny Stokes  Administration: Seun Covington  Administration: Osman Cancino  Administration: Donaldo Hawkins  Administration: Cassie Gzumán  Administration: Jonelle Alejandre  Admitting: Cheko Price  Attending: Cheko Price  Cardiology Technician: Leslye Ramírez  Consultant: Radha Bautista  Consultant: Nora Brown  Consultant: Theo Ortega  Consultant: Patti Morataya  ED Attending: Fito Becker  ED Nurse: Krystin Tinoco  HIM/Billing & Coding: Princess Liriano  Nurse: Shelia Salinas  Nurse: Justice Sol  Occupational Therapy: Rica Agosto  Ordered: ServiceAccount, SCMMLM  Ordered: ADM, User  Override: Tamika Zimmer  Override: Justice Sol  PCA/Nursing Assistant: Oksana Steve  Physical Therapy: Danny Alba  Primary Team: Zayra Mancia  Primary Team: Angie Gar  Primary Team: Shavon Boateng  Primary Team: Velma Tenorio  Primary Team: Zac Hernandez  Respiratory Therapy: Arielle Levy  : Cami Whitman  : Marisol Helms  Team: TERENCE  Hospitalists, Team  UR// Supp. Assoc.: Henry Giron

## 2023-12-26 NOTE — DISCHARGE NOTE NURSING/CASE MANAGEMENT/SOCIAL WORK - PATIENT PORTAL LINK FT
You can access the FollowMyHealth Patient Portal offered by [facility] by registering at the following website: [email or web address]. By joining [employer]’s FollowMyHealth portal, you will also be able to view your health information using other applications (apps) compatible with our system. You can access the FollowMyHealth Patient Portal offered by [facility] by registering at the following website: [email or web address]. By joining [employer]’s FollowMyHealth portal, you will also be able to view your health information using other applications (apps) compatible with our system.

## 2023-12-26 NOTE — PROGRESS NOTE ADULT - SUBJECTIVE AND OBJECTIVE BOX
[facility] -- Cadence Wasserman,  Tori, Manohar Damon, Carlos Ortega Cohen  Office # [phone]    Follow Up:  syncope    Subjective/Observations: No events overnight resting comfortably in stretcher. Denied any syncopal episode or dizziness overnight.    No complaints of chest pain, dyspnea, or palpitations reported. No signs of orthopnea or PND.      REVIEW OF SYSTEMS: All other review of systems is negative unless indicated above  PAST MEDICAL & SURGICAL HISTORY:  Arthritis      Osteoporosis      Prolapsed uterus      Celiac sprue      Elective surgery  pessary placed  as outpatient  2016      H/O major orthopedic surgery  kell placed due to fx        MEDICATIONS  (STANDING):  atorvastatin 20 milliGRAM(s) Oral at bedtime  enoxaparin Injectable 40 milliGRAM(s) SubCutaneous every 24 hours  fluticasone propionate 50 MICROgram(s)/spray Nasal Spray 1 Spray(s) Both Nostrils two times a day  pantoprazole    Tablet 40 milliGRAM(s) Oral before breakfast    MEDICATIONS  (PRN):  acetaminophen     Tablet .. 650 milliGRAM(s) Oral every 6 hours PRN Temp greater or equal to 38C (100.4F), Mild Pain (1 - 3)    Allergies    Gluten (Flatulence; Other; Diarrhea)  No Known Drug Allergies    Intolerances      Vital Signs Last 24 Hrs  T(C): 37 (26 Dec 2023 05:26), Max: 37.1 (25 Dec 2023 23:51)  T(F): 98.6 (26 Dec 2023 05:26), Max: 98.7 (25 Dec 2023 23:51)  HR: 97 (26 Dec 2023 05:26) (69 - 105)  BP: 164/84 (26 Dec 2023 05:26) (127/80 - 186/90)  BP(mean): --  RR: 18 (26 Dec 2023 05:26) (15 - 18)  SpO2: 97% (26 Dec 2023 05:26) (94% - 98%)    Parameters below as of 26 Dec 2023 05:26  Patient On (Oxygen Delivery Method): room air      I&O's Summary      TELE:   PHYSICAL EXAM:  Constitutional: NAD, awake and alert  HEENT: Moist Mucous Membranes, Anicteric  Pulmonary: Non-labored, breath sounds are clear bilaterally, No wheezing, rales or rhonchi  Cardiovascular: Regular, S1 and S2, No murmurs, rubs, gallops or clicks  Gastrointestinal: Bowel Sounds present, soft, nontender.   Lymph: No peripheral edema. No lymphadenopathy.  Skin: No visible rashes or ulcers.  Psych:  Mood & affect appropriate  LABS: All Labs Reviewed:                        12.8   4.24  )-----------( 298      ( 26 Dec 2023 06:53 )             38.6                         14.2   4.67  )-----------( 317      ( 25 Dec 2023 09:35 )             42.2     25 Dec 2023 09:35    144    |  109    |  13     ----------------------------<  78     3.2     |  29     |  0.58     Ca    8.9        25 Dec 2023 09:35    TPro  6.2    /  Alb  3.1    /  TBili  0.5    /  DBili  x      /  AST  28     /  ALT  28     /  AlkPhos  66     25 Dec 2023 09:35      CARDIAC MARKERS ( 25 Dec 2023 13:14 )  x     / x     / 66 U/L / x     / 3.5 ng/mL      12 Lead ECG:   Ventricular Rate 77 BPM    Atrial Rate 77 BPM    P-R Interval 136 ms    QRS Duration 96 ms    Q-T Interval 394 ms    QTC Calculation(Bazett) 445 ms    P Axis 66 degrees    R Axis 65 degrees    T Axis 42 degrees    Diagnosis Line Normal sinus rhythm  Nonspecific ST abnormality  Confirmed by darrian Ortega ([id number]) on 12/25/2023 11:59:54 AM (12-25-23 @ 08:02)        [facility] -- Cadence Wasserman,  Tori, Manohar Damon, Carlos Ortega Cohen  Office # [phone]    Follow Up:  syncope    Subjective/Observations: No events overnight resting comfortably in stretcher. Denied any syncopal episode or dizziness overnight.    No complaints of chest pain, dyspnea, or palpitations reported. No signs of orthopnea or PND.      REVIEW OF SYSTEMS: All other review of systems is negative unless indicated above  PAST MEDICAL & SURGICAL HISTORY:  Arthritis      Osteoporosis      Prolapsed uterus      Celiac sprue      Elective surgery  pessary placed  as outpatient  2016      H/O major orthopedic surgery  kell placed due to fx        MEDICATIONS  (STANDING):  atorvastatin 20 milliGRAM(s) Oral at bedtime  enoxaparin Injectable 40 milliGRAM(s) SubCutaneous every 24 hours  fluticasone propionate 50 MICROgram(s)/spray Nasal Spray 1 Spray(s) Both Nostrils two times a day  pantoprazole    Tablet 40 milliGRAM(s) Oral before breakfast    MEDICATIONS  (PRN):  acetaminophen     Tablet .. 650 milliGRAM(s) Oral every 6 hours PRN Temp greater or equal to 38C (100.4F), Mild Pain (1 - 3)    Allergies    Gluten (Flatulence; Other; Diarrhea)  No Known Drug Allergies    Intolerances      Vital Signs Last 24 Hrs  T(C): 37 (26 Dec 2023 05:26), Max: 37.1 (25 Dec 2023 23:51)  T(F): 98.6 (26 Dec 2023 05:26), Max: 98.7 (25 Dec 2023 23:51)  HR: 97 (26 Dec 2023 05:26) (69 - 105)  BP: 164/84 (26 Dec 2023 05:26) (127/80 - 186/90)  BP(mean): --  RR: 18 (26 Dec 2023 05:26) (15 - 18)  SpO2: 97% (26 Dec 2023 05:26) (94% - 98%)    Parameters below as of 26 Dec 2023 05:26  Patient On (Oxygen Delivery Method): room air      I&O's Summary      TELE:   PHYSICAL EXAM:  Constitutional: NAD, awake and alert  HEENT: Moist Mucous Membranes, Anicteric  Pulmonary: Non-labored, breath sounds are clear bilaterally, No wheezing, rales or rhonchi  Cardiovascular: Regular, S1 and S2, No murmurs, rubs, gallops or clicks  Gastrointestinal: Bowel Sounds present, soft, nontender.   Lymph: No peripheral edema. No lymphadenopathy.  Skin: No visible rashes or ulcers.  Psych:  Mood & affect appropriate  LABS: All Labs Reviewed:                        12.8   4.24  )-----------( 298      ( 26 Dec 2023 06:53 )             38.6                         14.2   4.67  )-----------( 317      ( 25 Dec 2023 09:35 )             42.2     25 Dec 2023 09:35    144    |  109    |  13     ----------------------------<  78     3.2     |  29     |  0.58     Ca    8.9        25 Dec 2023 09:35    TPro  6.2    /  Alb  3.1    /  TBili  0.5    /  DBili  x      /  AST  28     /  ALT  28     /  AlkPhos  66     25 Dec 2023 09:35      CARDIAC MARKERS ( 25 Dec 2023 13:14 )  x     / x     / 66 U/L / x     / 3.5 ng/mL      12 Lead ECG:   Ventricular Rate 77 BPM    Atrial Rate 77 BPM    P-R Interval 136 ms    QRS Duration 96 ms    Q-T Interval 394 ms    QTC Calculation(Bazett) 445 ms    P Axis 66 degrees    R Axis 65 degrees    T Axis 42 degrees    Diagnosis Line Normal sinus rhythm  Nonspecific ST abnormality  Confirmed by darrian Ortega ([id number]) on 12/25/2023 11:59:54 AM (12-25-23 @ 08:02)

## 2023-12-26 NOTE — DISCHARGE NOTE NURSING/CASE MANAGEMENT/SOCIAL WORK - NSFLUVACAGEDISCH_IMM_ALL_CORE
Pt to ER c/o wound infection to bottom of left foot. H/o stasis dermatitis. Pt reports he sees wound care x2 weekly. Wound draining purulent drainage.Denies fever. Pain 5/10. Ambulatory with cane.   
Adult

## 2023-12-26 NOTE — DISCHARGE NOTE PROVIDER - NSDCFUSCHEDAPPT_GEN_ALL_CORE_FT
Nahomi Orantes  [facility]  UROGYN [address]  Scheduled Appointment: 01/22/2024     Nahomi Orantes  [facility]  UROGYN [address]  Scheduled Appointment: 01/22/2024

## 2023-12-26 NOTE — DISCHARGE NOTE PROVIDER - CARE PROVIDER_API CALL
Bear Vila  Cardiovascular Disease  [address] [place] [address]  Phone: [phone]  Fax: [phone]  Follow Up Time:    Bear Vila  Cardiovascular Disease  [address] [place] [address]  Phone: [phone]  Fax: [phone]  Follow Up Time:

## 2023-12-26 NOTE — OCCUPATIONAL THERAPY INITIAL EVALUATION ADULT - GENERAL OBSERVATIONS, REHAB EVAL
Pt clear for OT eval per KEISHA Rendon. Pt received standing at sink with bathroom with CNA, (+) tele, NAD. Pt agrees to participate with OT for eval and cortes well.

## 2023-12-26 NOTE — DISCHARGE NOTE NURSING/CASE MANAGEMENT/SOCIAL WORK - NSDCPEFALRISK_GEN_ALL_CORE
For information on Fall & Injury Prevention, visit: [email or web address] OR  [email or web address] OR  https://www.cdc.gov/steadi/patient.html For information on Fall & Injury Prevention, visit: [email or web address] OR  [email or web address] OR  https://www.cdc.gov/steadi/patient.html

## 2023-12-26 NOTE — OCCUPATIONAL THERAPY INITIAL EVALUATION ADULT - BED MOBILITY/TRANSFERS, PREVIOUS LEVEL OF FUNCTION, OT EVAL
Health Maintenance Due   Topic Date Due   • Shingles Vaccine (1 of 2) Never done   • DTaP/Tdap/Td Vaccine (1 - Tdap) 09/23/2010   • COVID-19 Vaccine (4 - Pfizer series) 02/16/2022       Patient is due for topics as listed above but is not proceeding with Immunization(s) COVID-19, Dtap/Tdap/Td and Shingles at this time.    independent

## 2023-12-26 NOTE — CARE COORDINATION ASSESSMENT. - OTHER PERTINENT DISCHARGE PLANNING INFORMATION:
CM met with the patient at the bedside, educated pt on role of CM and transition planning. Patient verbalized understanding. CM provided direct contact/resource folder and remains available. Pt resides in a house with her spouse, has 3 steps to enter, a flight to basement and flight to upstairs. Pt denies having a caregiver, stated that she is completely independent with ADL's, ambulating and negotiating steps. Denies any prior home care visiting nurse services. Pharmacy [facility], [address].

## 2023-12-26 NOTE — OCCUPATIONAL THERAPY INITIAL EVALUATION ADULT - PERTINENT HX OF CURRENT PROBLEM, REHAB EVAL
88 year-old F with PMHx of supraventricular ectopy, palpitations, dyspnea, mild aortic insufficiency, mild carotid atherosclerosis, HLD, celiac disease, a hepatic cyst, a R kidney cyst, a cystocele, left thyroid nodules, osteoporosis, urinary stress incontinence (pessary) and a left femur stress hairline fracture (thought to be related to therapy with Fosamax or Prolia - status post ORIF 7/18/2018), brought in by EMS status post syncopal episode at home. Admitted for syncope and elevated trop work up.

## 2023-12-26 NOTE — PROGRESS NOTE ADULT - TIME BILLING
Reviewing chart notes and data, reviewing telemetry monitor records, face to face time counseling the patient, communicating with Stephanie TAVAREZ cardio, coordinating care with SW/CM at [facility]. Reviewing chart notes and data, reviewing telemetry monitor records, face to face time counseling the patient, communicating with Stephanie TAVAREZ cardio, coordinating care with SW/CM at [facility].

## 2023-12-26 NOTE — PROGRESS NOTE ADULT - PROBLEM SELECTOR PLAN 2
- on admission, trop 248.9 --> 405.1 most likely demand ischemia in the setting of syncope   - EKG: NSR hr 77bpm  No acute st changes   - Cardiology following recommendations appreciated. Trended to peak - no further trends

## 2023-12-26 NOTE — DISCHARGE NOTE PROVIDER - NSDCCPCAREPLAN_GEN_ALL_CORE_FT
PRINCIPAL DISCHARGE DIAGNOSIS  Diagnosis: Syncope  Assessment and Plan of Treatment: you were admitted to the hospital for a syncopal episode likely related to dehydration   -you received IV fluids and monitored on telemetery  -you were evaluated by cardiology - follow up with Dr Vila within the week for continued management and possible outpatient monitor   -It is important to stay hydrated and eat balanced meals  -Follow up the official results of your echocardiogram with your cardiologist

## 2023-12-26 NOTE — DISCHARGE NOTE NURSING/CASE MANAGEMENT/SOCIAL WORK - NSDCVIVACCINE_GEN_ALL_CORE_FT
Tdap; 26-Aug-2018 06:48; Ty King (RN); [employer]; [id number]; IntraMuscular; Deltoid Left.; 0.5 milliLiter(s); VIS (VIS Published: 09-May-2013, VIS Presented: 26-Aug-2018);

## 2023-12-26 NOTE — DISCHARGE NOTE PROVIDER - CARE PROVIDERS DIRECT ADDRESSES
[email or web address]

## 2023-12-26 NOTE — DISCHARGE NOTE PROVIDER - HOSPITAL COURSE
88 year-old F with PMHx  of supraventricular ectopy, palpitations, dyspnea, mild aortic insufficiency, mild carotid atherosclerosis, HLD, celiac disease, a hepatic cyst, a R kidney cyst, a cystocele, left thyroid nodules, osteoporosis, urinary stress incontinence (pessary) and a left femur stress hairline fracture (thought to be related to therapy with Fosamax or Prolia - status post ORIF 7/18/2018), brought in by EMS status post syncopal episode at home. Admitted for syncope and elevated trop work up   Cardio consulted, recommendations appreciated. Trop trended to peak.   CT head, neck performed.   TTE, carotid dopplers performed.     Stable for dc - discussed with cardio - with close outpatient follow up

## 2023-12-26 NOTE — PROGRESS NOTE ADULT - PROBLEM SELECTOR PLAN 4
/71 in ED  - no history of HTN, likely 2/2 to stress/anxiety  - monitor hemodynamic status and address accordingly

## 2023-12-26 NOTE — CASE MANAGEMENT PROGRESS NOTE - NSCMPROGRESSNOTE_GEN_ALL_CORE
Per MD plan is  for transition home today after TTE is completed. Discharge notice reviewed, copy given to patient. Pt is in agreement with trasitioning home today. Spouse to transport pt home when discharge is confirmed. No skilled needs identified.

## 2023-12-26 NOTE — PHYSICAL THERAPY INITIAL EVALUATION ADULT - GENERAL OBSERVATIONS, REHAB EVAL
Patient chart reviewed, events noted. Patient cleared to be seen for therapy by RN prior to session. Patient received seated in bathroom, sponge bathing with NA, on room air, in NAD and agreeable to PT at this time. +Remote tele monitor donned t/o.

## 2023-12-26 NOTE — PHYSICAL THERAPY INITIAL EVALUATION ADULT - ADDITIONAL COMMENTS
Pt reports she lives with her  in a private home with 3 steps to enter (+) rail; bedroom/bathroom with tub (+grab bars) on 1 level. Laundry is downstairs. PTA pt was independent with ADLs/IADLs and functional mobility without AD, patient states she does have a rolling walker at home. Pt states she drives.

## 2023-12-26 NOTE — PROGRESS NOTE ADULT - SUBJECTIVE AND OBJECTIVE BOX
Patient is a 88y old  Female who presents with a chief complaint of syncope and elevated trop likely demand (26 Dec 2023 09:51)      INTERVAL HPI/OVERNIGHT EVENTS: Patient seen and examined at bedside. No overnight events.  Feels back to baseline    MEDICATIONS  (STANDING):  atorvastatin 20 milliGRAM(s) Oral at bedtime  enoxaparin Injectable 40 milliGRAM(s) SubCutaneous every 24 hours  fluticasone propionate 50 MICROgram(s)/spray Nasal Spray 1 Spray(s) Both Nostrils two times a day  pantoprazole    Tablet 40 milliGRAM(s) Oral before breakfast    MEDICATIONS  (PRN):  acetaminophen     Tablet .. 650 milliGRAM(s) Oral every 6 hours PRN Temp greater or equal to 38C (100.4F), Mild Pain (1 - 3)      Allergies    Gluten (Flatulence; Other; Diarrhea)  No Known Drug Allergies    Intolerances        REVIEW OF SYSTEMS:  CONSTITUTIONAL: No fever or chills  CARDIOVASCULAR: No chest pain, palpitations  GASTROINTESTINAL: No abd pain, nausea, vomiting    Vital Signs Last 24 Hrs  T(C): 36.7 (26 Dec 2023 09:27), Max: 37.1 (25 Dec 2023 23:51)  T(F): 98 (26 Dec 2023 09:27), Max: 98.7 (25 Dec 2023 23:51)  HR: 71 (26 Dec 2023 09:27) (69 - 105)  BP: 134/74 (26 Dec 2023 09:27) (127/80 - 186/90)  BP(mean): --  RR: 18 (26 Dec 2023 09:27) (15 - 18)  SpO2: 98% (26 Dec 2023 09:27) (94% - 98%)    Parameters below as of 26 Dec 2023 09:27  Patient On (Oxygen Delivery Method): room air      I&O's Summary    BMI (kg/m2): 20.6 (12-25-23 @ 07:34)    PHYSICAL EXAM:  GENERAL: NAD  HEENT:  AT/NC, anicteric, moist mucous membranes, EOMI, PERRL, no lid-lag, conjunctiva and sclera clear  CHEST/LUNG:  CTA b/l, no rales, wheezes, or rhonchi,  normal respiratory effort, no intercostal retractions  HEART:  RRR, S1, S2, no murmurs; no pitting edema  ABDOMEN:  BS+, soft, nontender, nondistended  MSK/EXTREMITIES: palpable peripheral pulses, no clubbing or cyanosis  NERVOUS SYSTEM: answers questions and follows commands appropriately, A&Ox3 grossly moves all extremities   PSYCH: Appropriate affect, Alert & Awake; Good judgement      LABS: Personally reviewed  CBC                        12.8   4.24  )-----------( 298      ( 26 Dec 2023 06:53 )             38.6     CMP  12-25    144  |  109  |  13  ----------------------------<  78  3.2   |  29  |  0.58    Ca    8.9      25 Dec 2023 09:35    TPro  6.2  /  Alb  3.1  /  TBili  0.5  /  DBili  x   /  AST  28  /  ALT  28  /  AlkPhos  66  12-25                CARDIAC MARKERS ( 25 Dec 2023 18:35 )  x     / 346.6 ng/L / x     / x     / x      CARDIAC MARKERS ( 25 Dec 2023 13:14 )  x     / 405.1 ng/L / 66 U/L / x     / 3.5 ng/mL  CARDIAC MARKERS ( 25 Dec 2023 09:35 )  x     / 258.9 ng/L / x     / x     / x                            Urinalysis Basic - ( 25 Dec 2023 09:35 )    Color: x / Appearance: x / SG: x / pH: x  Gluc: 78 mg/dL / Ketone: x  / Bili: x / Urobili: x   Blood: x / Protein: x / Nitrite: x   Leuk Esterase: x / RBC: x / WBC x   Sq Epi: x / Non Sq Epi: x / Bacteria: x                RADIOLOGY & ADDITIONAL TESTS: Personally reviewed.     Consultant(s) Notes Reviewed:  [x] YES  [ ] NO - discussed with cardio NP Farin, symptoms possibly increased vagal tone from using the bathroom, hypotension/syncope. Cardiac enzymes trended to peak, no need to further trend. Stable for dc pending tte performed.    Discussed with SW/SOBEIDA, RN

## 2023-12-26 NOTE — PHYSICAL THERAPY INITIAL EVALUATION ADULT - NSPTDISCHREC_GEN_A_CORE
Pt currently at her baseline strength and mobility, navigates environment without assistive device; no skilled PT needs identified at this time; pt to be discharged from inpatient PT program at this time pending change in functional mobility status./No skilled PT needs

## 2023-12-26 NOTE — PHYSICAL THERAPY INITIAL EVALUATION ADULT - MD/RN NOTIFIED
Patient has an order for an MRI.  She cannot do a closed MRI.  She needs an open MRI.  Please call patient and advise whee she can have it done.  She will also need a new authorization.  
[facility] in [place] has open MRI, I am not sure which facility however is IN network  
Spoke with patient per patient this should be going through her [employer], patient states she will call her insurance and call us back with the information.  
The patients   Stefan Araujo of [facility] ,called telling me to change her billing from ezeep to her medical insurance and that he was disappointed in me for even taking the patients car insurance for her medical claim, he also told me that I was going to do a referral for the patient to have an open MRI. I told Stefan that with out authorization from the patient we can not speak to him due to Hippa, he told me I was wrong and did not know hippa and he has never needed authorization. I explained to him that going forward we would not be able to speak to him concerning the patient unless we have authorization from Pat.   Stefan then called me back to tell me that he called the billing department and gave them the patients name, and social and they talked to him about the patient with out authorization.    I then called the patient and informed her that we can not speak to her  with out written authorization from her, she understood she said she did not know why he called here.  I did tell her that when she does call to schedule her Mri to ask the  if they have an open MRI.   
yes

## 2023-12-26 NOTE — DISCHARGE NOTE PROVIDER - NSDCMRMEDTOKEN_GEN_ALL_CORE_FT
acetaminophen 325 mg oral tablet: 2 tab(s) orally every 6 hours As needed Temp greater or equal to 38C (100.4F), Mild Pain (1 - 3)  fluticasone 50 mcg/inh nasal spray: 1 spray(s) in each nostril once a day  omeprazole 40 mg oral delayed release capsule: 1 cap(s) orally once a day  rosuvastatin 5 mg oral tablet: 1 tab(s) orally once a day  Vagifem 10 mcg vaginal tablet: 1 tab(s) intravaginally 2 times a week

## 2023-12-29 ENCOUNTER — NON-APPOINTMENT (OUTPATIENT)
Age: 88
End: 2023-12-29

## 2023-12-31 PROBLEM — Z23 NEED FOR 23-POLYVALENT PNEUMOCOCCAL POLYSACCHARIDE VACCINE: Status: ACTIVE | Noted: 2019-07-17

## 2024-01-01 NOTE — ED ADULT NURSE NOTE - CAS TRG GEN SKIN CONDITION
Remains on HFJV, FiO2 44-56%, up to 70% with x-rays. PIP increased x1, acceptable CBG. BP's soft for a short time, recovered on his own.  Dopamine stopped.  No changes to Fentanyl drip, PRN x1. CHAB done x2.  Glucoses 225-241, Insulin bolus x3.  NPO, OG to LIS- no output. UOP increasing, no stool.    Warm/Dry

## 2024-01-02 ENCOUNTER — TRANSCRIPTION ENCOUNTER (OUTPATIENT)
Age: 89
End: 2024-01-02

## 2024-01-03 ENCOUNTER — APPOINTMENT (OUTPATIENT)
Dept: CARDIOLOGY | Facility: CLINIC | Age: 89
End: 2024-01-03
Payer: MEDICARE

## 2024-01-03 VITALS
OXYGEN SATURATION: 99 % | BODY MASS INDEX: 20.36 KG/M2 | SYSTOLIC BLOOD PRESSURE: 163 MMHG | HEIGHT: 59 IN | DIASTOLIC BLOOD PRESSURE: 84 MMHG | WEIGHT: 101 LBS | HEART RATE: 71 BPM

## 2024-01-03 DIAGNOSIS — R03.0 ELEVATED BLOOD-PRESSURE READING, W/OUT DIAGNOSIS OF HYPERTENSION: ICD-10-CM

## 2024-01-03 DIAGNOSIS — I49.1 ATRIAL PREMATURE DEPOLARIZATION: ICD-10-CM

## 2024-01-03 PROCEDURE — 99215 OFFICE O/P EST HI 40 MIN: CPT

## 2024-01-03 PROCEDURE — 93000 ELECTROCARDIOGRAM COMPLETE: CPT

## 2024-01-03 NOTE — DISCUSSION/SUMMARY
[FreeTextEntry1] : Mrs. Pacheco experienced a syncopal episode while standing at the kitchen sink on 12/25/2023, shortly having arisen from bed that morning.  This episode was preceded by a vaguely-describes sensation in her head, quickly preceding to syncope.  She was brought by ambulance to [facility], where a CT scan of the head and neck as well as a carotid artery Doppler study performed that day were unrevealing.  An echocardiogram performed the following day revealed normal left ventricular systolic function with mild aortic insufficiency.  The patient was discharged from the hospital on 12/26/2023 and has not experienced recurrence of syncope since coming home from the hospital, nor has she experienced any episodes of presyncope.  Her cardiac examination today is remarkable for a faint systolic murmur heard throughout the precordium, unchanged from her previous visit with me.  Her blood pressure reading today is mildly elevated.  Her electrocardiogram today reveals sinus rhythm with 3 supraventricular premature contractions, a nonspecific RSR' pattern in leads V1-V2 and nonspecific repolarization abnormalities, essentially unchanged from her previous office tracing, allowing for lead placement variation.  The etiology of the syncopal episode that the patient experienced on 12/25/2023 is uncertain.  Although it is possible that this episode was vagally-mediated and/or orthostatic in nature, the possibility of of an arrhythmic etiology needs to be considered in this patient's age group.  For further evaluation, I am referring the patient for a 2-week extended Holter monitor study.  She will make arrangements to have this study performed through our office, and I will telephone her to discuss the findings, once the study has been completed.  If this study is unrevealing, consideration will be given toward insertion of an implantable loop recording device, especially if the patient should experience recurrence of syncope or any episodes of presyncope.  I again reassured the patient today that the supraventricular ectopy she has exhibited is most likely benign in nature.  I suspect that the patient's previous episodes of palpitations are benign in nature as well.  If these palpitations should become more frequent, more intense, or of longer duration, consideration will be given toward Zio patch event monitoring or implantation of an implantable loop recorder device for further evaluation. It is possible that the episodes of palpitations that have awakened her from sleep may be triggered by obstructive sleep apnea, however, she states that she experiences similar symptoms while awake, especially when feeling stressed or when she is exposed to particularly hot and humid conditions.  I have reviewed the findings of the nuclear stress study of 10/19/2021, the inpatient echocardiogram of 12/26/2023, and the inpatient carotid artery Doppler study of 12/25/2023 in detail with the patient today.  I have reviewed the findings of the blood test report of 8/16/2023 in detail with the patient today, and I have instructed her to continue Crestor 5 mg daily for the time being.  I have asked the patient to call me if she should have any questions or problems pertaining to these matters, and especially if she should experience recurrence of syncope or any concerning symptoms.  I have otherwise asked her to return to the office for follow-up cardiac evaluation in 3 months, provided she remains clinically stable in the interim and the findings on the upcoming extended Holter monitor study do not warrant sooner evaluation and/or treatment. [EKG obtained to assist in diagnosis and management of assessed problem(s)] : EKG obtained to assist in diagnosis and management of assessed problem(s)

## 2024-01-03 NOTE — HISTORY OF PRESENT ILLNESS
[FreeTextEntry1] : Mrs. Love Pacheco presented to the office today for follow-up cardiac evaluation.  I initially evaluated the patient in the office on 9/1/2021 in cardiology consultation.  She was referred here at that time by Dr. Wang for further evaluation of electrocardiographic abnormalities, palpitations, and dyspnea.  I have also taken care of her .  I last evaluated the patient in the office on 12/5/2023.  The patient is an 88-year-old female with a history of supraventricular ectopy, palpitations, dyspnea, mild aortic insufficiency, mild carotid atherosclerosis, a dyslipidemia, celiac disease, a hepatic cyst, a right kidney cyst, a cystocele, left thyroid nodules, osteoporosis, urinary stress incontinence (pessary), a uterine polyp, tonsillectomy, and a left femur stress hairline fracture (thought to be related to therapy with Fosamax or Prolia - status post ORIF 7/18/2018).  The patient awakened at approximately 6:00 AM on 12/25/2023, promptly got out of bed and went into the kitchen.  While standing at the sink shortly after having come into the kitchen, the patient experienced a vague sensation in her head, quickly progressing to a syncopal episode.  She fell to the floor, with most of her body landing on a carpeted section, however, she states that her head hit the ceramic floor.  She quickly regained consciousness, however, she felt too weak to stand up on her own.  Her  called the fire department and assisted the patient up.  She was brought to the emergency department at [facility].  A CT scan of the head and neck performed on 12/25/2023 in the hospital did not reveal any acute findings.  Carotid Doppler study performed in the hospital on 12/25/2023 did not reveal any significant stenoses.  The patient was monitored in the hospital overnight, without any significant arrhythmias detected.  Echocardiography performed in the hospital on 12/26/2023 revealed normal cardiac chamber sizes with normal left ventricular wall thickness and wall motion (ejection fraction 57%).  Mild aortic insufficiency was identified.  The patient was discharged home on 12/26/2023.  Since coming home from the hospital, the patient has not experienced recurrence of syncope, nor has she experienced any episodes of presyncope.  She has noted dyspnea on exertion over the past several years in association with climbing stairs, without any recent change in the degree or pattern of the symptom.  Note that she exercises 3 times weekly, without experiencing significant dyspnea.  She has not experienced chest discomfort in association with her activities.  She has not noted orthopnea, paroxysmal nocturnal dyspnea, or lower extremity edema.  She has not experienced any episodes of palpitations.  The patient had her  drive her to the emergency department in [facility] early in the morning on 5/12/2023 for further evaluation of palpitations that she was experiencing that night, described as a "pounding" sensation in her chest.  She was noted to be exhibiting sinus rhythm with supraventricular ectopy in the emergency department.  Serum troponin levels were negative for evidence of an acute coronary syndrome.  The patient's symptoms were attributed to stress and anxiety, noting that she reported being under considerable stress related to her son having bipolar disorder and having lost his job.  She was released from the emergency department after being monitored for several hours.  She has not experienced recurrence of palpitations since that date.  As far as risk factors for coronary artery disease are concerned, the patient has a dyslipidemia.  She does not have a history of diabetes or hypertension.  She denies a history of cigarette smoking.  She describes a family history of coronary disease, stating that her father suffered "a heart attack" in his 60's.  Laboratory studies performed on 8/16/2023 (on Crestor 5 mg daily) revealed cholesterol 171, triglycerides 56, HDL 68, and calculated LDL 92.  The liver chemistries were normal.  The BUN and creatinine were 10 and 0.69, respectively.  The potassium level was 4.3.  The glucose level was 80 and the hemoglobin A1c level was 5.4%.  The TSH level was 2.93.  The vitamin D level was 63.6.  Exercise stress testing performed on 9/28/2021 was positive for electrocardiographic evidence of myocardial ischemia without the inducement of cardiac symptoms.  The patient was referred for nuclear stress testing for further evaluation.  Nuclear stress testing performed on 10/19/2021 was negative for the inducement of cardiac symptoms, however, up to 1 mm of horizontal ST segment depression developed in the lateral leads.  The cardiac imaging portion of the study revealed normal left ventricular myocardial perfusion and systolic function.  Echocardiography performed as an inpatient at [facility] (admitted for syncope) on 12/26/2023 revealed normal cardiac chamber sizes with normal left ventricular wall thickness and wall motion.  Left ventricular systolic function was normal, with a calculated ejection fraction of 57%.  Mild aortic insufficiency was demonstrated.  Carotid artery Doppler testing performed on 9/13/2021 revealed mild plaque formation involving the right bulbar region and the proximal portion of the right internal carotid artery.  Carotid artery Doppler testing most recently performed as an inpatient at [facility] (admitted for syncope) on 12/25/2023 did not reveal any significant stenoses.

## 2024-01-03 NOTE — CARDIOLOGY SUMMARY
[de-identified] : 1/3/24 -sinus rhythm at a rate of 70 bpm.  3 supraventricular premature contractions.  Nonspecific RSR' pattern in lead V2.  Nonspecific repolarization changes.

## 2024-01-03 NOTE — PHYSICAL EXAM
[Well Developed] : well developed [No Acute Distress] : no acute distress [Normal Conjunctiva] : normal conjunctiva [No Carotid Bruit] : no carotid bruit [Normal S1, S2] : normal S1, S2 [No Rub] : no rub [No Gallop] : no gallop [Clear Lung Fields] : clear lung fields [No Respiratory Distress] : no respiratory distress  [Soft] : abdomen soft [Non Tender] : non-tender [Normal Gait] : normal gait [No Edema] : no edema [No Rash] : no rash [Moves all extremities] : moves all extremities [Alert and Oriented] : alert and oriented [de-identified] : No JVD is appreciated at a 45 degree angle [de-identified] : I/VI systolic murmur throughout precordium

## 2024-01-04 ENCOUNTER — APPOINTMENT (OUTPATIENT)
Dept: INTERNAL MEDICINE | Facility: CLINIC | Age: 89
End: 2024-01-04
Payer: MEDICARE

## 2024-01-04 VITALS
BODY MASS INDEX: 19.76 KG/M2 | DIASTOLIC BLOOD PRESSURE: 74 MMHG | OXYGEN SATURATION: 97 % | RESPIRATION RATE: 16 BRPM | HEIGHT: 59 IN | SYSTOLIC BLOOD PRESSURE: 158 MMHG | WEIGHT: 98 LBS | TEMPERATURE: 98.1 F | HEART RATE: 78 BPM

## 2024-01-04 VITALS — DIASTOLIC BLOOD PRESSURE: 60 MMHG | SYSTOLIC BLOOD PRESSURE: 140 MMHG

## 2024-01-04 DIAGNOSIS — R55 SYNCOPE AND COLLAPSE: ICD-10-CM

## 2024-01-04 DIAGNOSIS — R13.10 DYSPHAGIA, UNSPECIFIED: ICD-10-CM

## 2024-01-04 PROCEDURE — 99495 TRANSJ CARE MGMT MOD F2F 14D: CPT

## 2024-01-04 NOTE — HISTORY OF PRESENT ILLNESS
[Post-hospitalization from ___ Hospital] : Post-hospitalization from [unfilled] Hospital [Admitted on: ___] : The patient was admitted on [unfilled] [Discharged on ___] : discharged on [unfilled] [FreeTextEntry2] : On 12/25/23, pt was in the kitchen around 6 am. She passed out and hit her head on the ceramic floor. Her  heard and tried to help her up. She was weak so he called fire dept and she was taken to [facility]. She was there until 12/26/23. She has not had any recurrence. She saw her cardiologist yesterday. She has gerd and restricts her diet. Doesn't eat foods that are acidic which she enjoyed in the past. Continues to lose weight. Saw GI in May. Had UGI series done.

## 2024-01-04 NOTE — ASSESSMENT
[FreeTextEntry1] : syncope was rundown from holidays and had not eaten stay hydrated has been fine since episode  GERD and dysphagia continue pepcid and pantoprazole decreased appetite from stress too she will follow up with GI  hyperlipidemia continue rosuvastatin

## 2024-01-22 ENCOUNTER — APPOINTMENT (OUTPATIENT)
Dept: UROGYNECOLOGY | Facility: CLINIC | Age: 89
End: 2024-01-22
Payer: MEDICARE

## 2024-01-22 DIAGNOSIS — N36.8 OTHER SPECIFIED DISORDERS OF URETHRA: ICD-10-CM

## 2024-01-22 DIAGNOSIS — N95.2 POSTMENOPAUSAL ATROPHIC VAGINITIS: ICD-10-CM

## 2024-01-22 PROCEDURE — 99213 OFFICE O/P EST LOW 20 MIN: CPT

## 2024-01-22 NOTE — PHYSICAL EXAM
[No Acute Distress] : in no acute distress [Well developed] : well developed [Well Nourished] : ~L well nourished [Good Hygeine] : demonstrates good hygeine [Oriented x3] : oriented to person, place, and time [Normal Memory] : ~T memory was ~L unimpaired [Anxiety] : patient is not anxious [Normal Mood/Affect] : mood and affect are normal [Tenderness] : ~T no ~M abdominal tenderness observed [Distended] : not distended [LLQ] : in the left lower quadrant [No Mass] : no masses were palpated [Warm and Dry] : was warm and dry to touch [Inguinal LAD] : no adenopathy was noted in the inguinal lymph nodes [Normal Gait] : gait was normal [Vulvar Atrophy] : vulvar atrophy [Labia Majora] : were normal [Labia Minora] : were normal [Normal] : was normal [Atrophy] : atrophy [No Bleeding] : there was no active vaginal bleeding

## 2024-01-22 NOTE — PROCEDURE
[Good Fit] : fits well [Refit] : refit is not needed [Erosion] : no evidence of erosion [Discharge] : no vaginal discharge [Erythema] : no erythema [Infection] : no evidence of infection [Pessary Inserted] : inserted [Pessary Washed] : washed [Medication Review] : Medicaiton Review: Patient verbalizes understanding of risks and benefits [None] : no bleeding [FreeTextEntry1] : Incontinence dish #4 [FreeTextEntry3] : Replens, Yuvafem [FreeTextEntry8] : Reinforced daily pericare.  Continue pessary self-care.

## 2024-01-22 NOTE — HISTORY OF PRESENT ILLNESS
[FreeTextEntry1] : Love, 87 y/o female presents for follow up of pelvic prolapse and HANY. Prolapse is supported with Incontinence dish #4. She leaves it in for 4 days and removes it for 3 days. She is able to do self-care. She applies Replens and is also using Yuvafem 1/2 gram twice a week. Denies any bleeding, prolapse past the pessary, issues with urination or moving her bowels.

## 2024-01-29 ENCOUNTER — RX RENEWAL (OUTPATIENT)
Age: 89
End: 2024-01-29

## 2024-02-14 RX ORDER — METOPROLOL SUCCINATE 25 MG/1
25 TABLET, EXTENDED RELEASE ORAL DAILY
Qty: 90 | Refills: 3 | Status: ACTIVE | COMMUNITY
Start: 2024-02-14 | End: 1900-01-01

## 2024-03-08 ENCOUNTER — APPOINTMENT (OUTPATIENT)
Dept: GASTROENTEROLOGY | Facility: CLINIC | Age: 89
End: 2024-03-08

## 2024-03-15 PROBLEM — K90.0 CELIAC DISEASE: Status: ACTIVE | Noted: 2018-05-18

## 2024-03-15 NOTE — ASSESSMENT
[FreeTextEntry1] : IMPRESSION: #  Globus sensation/dysphagia, heartburn - seen GI Dr. Jacques in 2023, on PPI and pepcid -  Patient has seen ENT and had a normal examination.  -  Xray esophagram on 5/2023:  Nml transit of barium pill through the esophagus.  Mild tertiary esophageal contractions. minimal reflux.   #  Celiac disease -   Patient has a history of celiac disease which was documented by endoscopy many years ago.   #  Comorbidities:   History of mild aortic valve insufficiency, HTN, HLD, osteoporosis, cystocele

## 2024-03-15 NOTE — HISTORY OF PRESENT ILLNESS
[FreeTextEntry1] : 89 y/o woman with hx of aortic valve insufficiency, HTN, HLD, osteoporosis, cystocele, celiac disease,

## 2024-03-20 ENCOUNTER — INPATIENT (INPATIENT)
Facility: HOSPITAL | Age: 89
LOS: 3 days | Discharge: HOME | DRG: 65 | End: 2024-03-24
Attending: INTERNAL MEDICINE | Admitting: INTERNAL MEDICINE
Payer: MEDICARE

## 2024-03-20 VITALS
RESPIRATION RATE: 18 BRPM | OXYGEN SATURATION: 97 % | HEART RATE: 64 BPM | WEIGHT: 104.06 LBS | SYSTOLIC BLOOD PRESSURE: 190 MMHG | DIASTOLIC BLOOD PRESSURE: 76 MMHG | HEIGHT: 59 IN | TEMPERATURE: 98 F

## 2024-03-20 DIAGNOSIS — E78.5 HYPERLIPIDEMIA, UNSPECIFIED: ICD-10-CM

## 2024-03-20 DIAGNOSIS — K21.9 GASTRO-ESOPHAGEAL REFLUX DISEASE WITHOUT ESOPHAGITIS: ICD-10-CM

## 2024-03-20 DIAGNOSIS — I63.9 CEREBRAL INFARCTION, UNSPECIFIED: ICD-10-CM

## 2024-03-20 DIAGNOSIS — Z29.9 ENCOUNTER FOR PROPHYLACTIC MEASURES, UNSPECIFIED: ICD-10-CM

## 2024-03-20 DIAGNOSIS — Z98.890 OTHER SPECIFIED POSTPROCEDURAL STATES: Chronic | ICD-10-CM

## 2024-03-20 DIAGNOSIS — R79.89 OTHER SPECIFIED ABNORMAL FINDINGS OF BLOOD CHEMISTRY: ICD-10-CM

## 2024-03-20 DIAGNOSIS — Z41.9 ENCOUNTER FOR PROCEDURE FOR PURPOSES OTHER THAN REMEDYING HEALTH STATE, UNSPECIFIED: Chronic | ICD-10-CM

## 2024-03-20 DIAGNOSIS — Z87.19 PERSONAL HISTORY OF OTHER DISEASES OF THE DIGESTIVE SYSTEM: ICD-10-CM

## 2024-03-20 DIAGNOSIS — E87.6 HYPOKALEMIA: ICD-10-CM

## 2024-03-20 LAB
ALBUMIN SERPL ELPH-MCNC: 3.6 G/DL — SIGNIFICANT CHANGE UP (ref 3.3–5)
ALP SERPL-CCNC: 72 U/L — SIGNIFICANT CHANGE UP (ref 40–120)
ALT FLD-CCNC: 30 U/L — SIGNIFICANT CHANGE UP (ref 12–78)
ANION GAP SERPL CALC-SCNC: 6 MMOL/L — SIGNIFICANT CHANGE UP (ref 5–17)
APPEARANCE UR: CLEAR — SIGNIFICANT CHANGE UP
APTT BLD: 28.3 SEC — SIGNIFICANT CHANGE UP (ref 24.5–35.6)
AST SERPL-CCNC: 31 U/L — SIGNIFICANT CHANGE UP (ref 15–37)
BASOPHILS # BLD AUTO: 0.05 K/UL — SIGNIFICANT CHANGE UP (ref 0–0.2)
BASOPHILS NFR BLD AUTO: 0.8 % — SIGNIFICANT CHANGE UP (ref 0–2)
BILIRUB SERPL-MCNC: 0.5 MG/DL — SIGNIFICANT CHANGE UP (ref 0.2–1.2)
BILIRUB UR-MCNC: NEGATIVE — SIGNIFICANT CHANGE UP
BUN SERPL-MCNC: 12 MG/DL — SIGNIFICANT CHANGE UP (ref 7–23)
CALCIUM SERPL-MCNC: 9.4 MG/DL — SIGNIFICANT CHANGE UP (ref 8.5–10.1)
CHLORIDE SERPL-SCNC: 106 MMOL/L — SIGNIFICANT CHANGE UP (ref 96–108)
CO2 SERPL-SCNC: 32 MMOL/L — HIGH (ref 22–31)
COLOR SPEC: YELLOW — SIGNIFICANT CHANGE UP
CREAT SERPL-MCNC: 0.76 MG/DL — SIGNIFICANT CHANGE UP (ref 0.5–1.3)
DIFF PNL FLD: NEGATIVE — SIGNIFICANT CHANGE UP
EGFR: 76 ML/MIN/1.73M2 — SIGNIFICANT CHANGE UP
EOSINOPHIL # BLD AUTO: 0.1 K/UL — SIGNIFICANT CHANGE UP (ref 0–0.5)
EOSINOPHIL NFR BLD AUTO: 1.5 % — SIGNIFICANT CHANGE UP (ref 0–6)
GLUCOSE SERPL-MCNC: 117 MG/DL — HIGH (ref 70–99)
GLUCOSE UR QL: NEGATIVE MG/DL — SIGNIFICANT CHANGE UP
HCT VFR BLD CALC: 43.5 % — SIGNIFICANT CHANGE UP (ref 34.5–45)
HGB BLD-MCNC: 14.2 G/DL — SIGNIFICANT CHANGE UP (ref 11.5–15.5)
IMM GRANULOCYTES NFR BLD AUTO: 0.2 % — SIGNIFICANT CHANGE UP (ref 0–0.9)
INR BLD: 0.95 RATIO — SIGNIFICANT CHANGE UP (ref 0.85–1.18)
KETONES UR-MCNC: NEGATIVE MG/DL — SIGNIFICANT CHANGE UP
LEUKOCYTE ESTERASE UR-ACNC: NEGATIVE — SIGNIFICANT CHANGE UP
LYMPHOCYTES # BLD AUTO: 1.26 K/UL — SIGNIFICANT CHANGE UP (ref 1–3.3)
LYMPHOCYTES # BLD AUTO: 19 % — SIGNIFICANT CHANGE UP (ref 13–44)
MCHC RBC-ENTMCNC: 30.8 PG — SIGNIFICANT CHANGE UP (ref 27–34)
MCHC RBC-ENTMCNC: 32.6 GM/DL — SIGNIFICANT CHANGE UP (ref 32–36)
MCV RBC AUTO: 94.4 FL — SIGNIFICANT CHANGE UP (ref 80–100)
MONOCYTES # BLD AUTO: 0.76 K/UL — SIGNIFICANT CHANGE UP (ref 0–0.9)
MONOCYTES NFR BLD AUTO: 11.4 % — SIGNIFICANT CHANGE UP (ref 2–14)
NEUTROPHILS # BLD AUTO: 4.46 K/UL — SIGNIFICANT CHANGE UP (ref 1.8–7.4)
NEUTROPHILS NFR BLD AUTO: 67.1 % — SIGNIFICANT CHANGE UP (ref 43–77)
NITRITE UR-MCNC: NEGATIVE — SIGNIFICANT CHANGE UP
NRBC # BLD: 0 /100 WBCS — SIGNIFICANT CHANGE UP (ref 0–0)
PH UR: 7.5 — SIGNIFICANT CHANGE UP (ref 5–8)
PLATELET # BLD AUTO: 323 K/UL — SIGNIFICANT CHANGE UP (ref 150–400)
POTASSIUM SERPL-MCNC: 3.3 MMOL/L — LOW (ref 3.5–5.3)
POTASSIUM SERPL-SCNC: 3.3 MMOL/L — LOW (ref 3.5–5.3)
PROT SERPL-MCNC: 7 G/DL — SIGNIFICANT CHANGE UP (ref 6–8.3)
PROT UR-MCNC: NEGATIVE MG/DL — SIGNIFICANT CHANGE UP
PROTHROM AB SERPL-ACNC: 11.1 SEC — SIGNIFICANT CHANGE UP (ref 9.5–13)
RBC # BLD: 4.61 M/UL — SIGNIFICANT CHANGE UP (ref 3.8–5.2)
RBC # FLD: 13.3 % — SIGNIFICANT CHANGE UP (ref 10.3–14.5)
SODIUM SERPL-SCNC: 144 MMOL/L — SIGNIFICANT CHANGE UP (ref 135–145)
SP GR SPEC: 1.03 — HIGH (ref 1–1.03)
TROPONIN I, HIGH SENSITIVITY RESULT: 402 NG/L — HIGH
UROBILINOGEN FLD QL: 0.2 MG/DL — SIGNIFICANT CHANGE UP (ref 0.2–1)
WBC # BLD: 6.64 K/UL — SIGNIFICANT CHANGE UP (ref 3.8–10.5)
WBC # FLD AUTO: 6.64 K/UL — SIGNIFICANT CHANGE UP (ref 3.8–10.5)

## 2024-03-20 PROCEDURE — 99285 EMERGENCY DEPT VISIT HI MDM: CPT

## 2024-03-20 PROCEDURE — 70498 CT ANGIOGRAPHY NECK: CPT | Mod: 26,MC

## 2024-03-20 PROCEDURE — 99291 CRITICAL CARE FIRST HOUR: CPT

## 2024-03-20 PROCEDURE — 70496 CT ANGIOGRAPHY HEAD: CPT | Mod: 26,MC

## 2024-03-20 PROCEDURE — 71045 X-RAY EXAM CHEST 1 VIEW: CPT | Mod: 26

## 2024-03-20 PROCEDURE — 99223 1ST HOSP IP/OBS HIGH 75: CPT | Mod: GC,AI

## 2024-03-20 PROCEDURE — 70450 CT HEAD/BRAIN W/O DYE: CPT | Mod: 26,MC,59

## 2024-03-20 PROCEDURE — 0042T: CPT | Mod: MC

## 2024-03-20 PROCEDURE — 93010 ELECTROCARDIOGRAM REPORT: CPT

## 2024-03-20 RX ORDER — ACETAMINOPHEN 500 MG
675 TABLET ORAL ONCE
Refills: 0 | Status: COMPLETED | OUTPATIENT
Start: 2024-03-20 | End: 2024-03-20

## 2024-03-20 RX ORDER — SODIUM CHLORIDE 9 MG/ML
10 INJECTION INTRAMUSCULAR; INTRAVENOUS; SUBCUTANEOUS ONCE
Refills: 0 | Status: COMPLETED | OUTPATIENT
Start: 2024-03-20 | End: 2024-03-20

## 2024-03-20 RX ORDER — LABETALOL HCL 100 MG
10 TABLET ORAL ONCE
Refills: 0 | Status: COMPLETED | OUTPATIENT
Start: 2024-03-20 | End: 2024-03-20

## 2024-03-20 RX ORDER — TENECTEPLASE 50 MG
12 KIT INTRAVENOUS ONCE
Refills: 0 | Status: COMPLETED | OUTPATIENT
Start: 2024-03-20 | End: 2024-03-20

## 2024-03-20 RX ORDER — OMEPRAZOLE 10 MG/1
1 CAPSULE, DELAYED RELEASE ORAL
Qty: 0 | Refills: 0 | DISCHARGE

## 2024-03-20 RX ORDER — FLUTICASONE PROPIONATE 50 MCG
1 SPRAY, SUSPENSION NASAL
Refills: 0 | DISCHARGE

## 2024-03-20 RX ADMIN — SODIUM CHLORIDE 10 MILLILITER(S): 9 INJECTION INTRAMUSCULAR; INTRAVENOUS; SUBCUTANEOUS at 17:00

## 2024-03-20 RX ADMIN — Medication 270 MILLIGRAM(S): at 20:08

## 2024-03-20 RX ADMIN — Medication 675 MILLIGRAM(S): at 20:30

## 2024-03-20 RX ADMIN — Medication 10 MILLIGRAM(S): at 16:50

## 2024-03-20 RX ADMIN — TENECTEPLASE 1728 MILLIGRAM(S): KIT at 17:00

## 2024-03-20 NOTE — ED ADULT TRIAGE NOTE - TEMPERATURE IN FAHRENHEIT (DEGREES F)
Prescription refilled per RN Medication Refill Policy..................Lashay Saravia RN 10/26/2021 8:35 AM     98

## 2024-03-20 NOTE — CONSULT NOTE ADULT - SUBJECTIVE AND OBJECTIVE BOX
ALPA BHATIA  [id number]  Patient is a 88y old  Female who presents with a chief complaint of R sided weakness and slurred speech   HPI:      Events in last 24 hours:     REVIEW OF SYSTEMS:    Gen: No fever  EYES/ENT: No visual changes;  No vertigo or throat pain   NECK: No pain   RES:  No shortness of breath or Cough  CARD: No chest pain   GI: No abdominal pain  : No dysuria  NEURO: + RT weakness and slurred speech   SKIN: No itching, rashes     Physical Exam:  Vital Signs Last 24 Hrs  T(C): 36.7 (20 Mar 2024 15:38), Max: 36.7 (20 Mar 2024 15:38)  T(F): 98 (20 Mar 2024 15:38), Max: 98 (20 Mar 2024 15:38)  HR: 64 (20 Mar 2024 15:38) (64 - 64)  BP: 194/84 (20 Mar 2024 16:31) (190/76 - 194/84)  BP(mean): --  RR: 18 (20 Mar 2024 16:31) (18 - 18)  SpO2: 96% (20 Mar 2024 16:31) (96% - 97%)    Parameters below as of 20 Mar 2024 16:31  Patient On (Oxygen Delivery Method): room air      Gen:  Awake, alert   CNS: non focal 	  Neck: no JVD  RES : clear , no wheezing                      CVS: Regular  rhythm. Normal S1/S2  Abd: Soft, non-distended. Bowel sounds present.  Skin: No rash.  Ext:  no edema    ============================I/O===========================   I&O's Detail    ============================ LABS =========================                        14.2   6.64  )-----------( 323      ( 20 Mar 2024 16:05 )             43.5     03-20    144  |  106  |  12  ----------------------------<  117<H>  3.3<L>   |  32<H>  |  0.76    Ca    9.4      20 Mar 2024 16:05    TPro  7.0  /  Alb  3.6  /  TBili  0.5  /  DBili  x   /  AST  31  /  ALT  30  /  AlkPhos  72  03-20    LIVER FUNCTIONS - ( 20 Mar 2024 16:05 )  Alb: 3.6 g/dL / Pro: 7.0 g/dL / ALK PHOS: 72 U/L / ALT: 30 U/L / AST: 31 U/L / GGT: x           PT/INR - ( 20 Mar 2024 16:05 )   PT: 11.1 sec;   INR: 0.95 ratio         PTT - ( 20 Mar 2024 16:05 )  PTT:28.3 sec    Urinalysis Basic - ( 20 Mar 2024 16:05 )    Color: x / Appearance: x / SG: x / pH: x  Gluc: 117 mg/dL / Ketone: x  / Bili: x / Urobili: x   Blood: x / Protein: x / Nitrite: x   Leuk Esterase: x / RBC: x / WBC x   Sq Epi: x / Non Sq Epi: x / Bacteria: x      ======================Micro/Rad/Cardio=================    CT scan: < from: CT Angio Brain Stroke Protocol  w/ IV Cont (03.20.24 @ 16:21) >  IMPRESSION:    CT perfusion:  Core infarct and ischemic penumbra in the left MCA territory:  CBF<30% volume: 3 ml. Left MCA territory, suggesting core infarct.  Tmax>6.0s volume: 33 ml  Mismatch volume: 30 ml, left MCA territory, suggesting ischemic penumbra.  Mismatch ratio: 11    CT angiography neck: No hemodynamically significant stenosis of the   bilateral cervical ICAs using NASCET criteria.  Patent vertebral   arteries.  No evidence of vascular dissection.    CT angiography brain:  1.  Short segment occlusion of a left proximal M2 branch (304:304,   607:53, 604:159) with distal reconstitution.  2.  No evidence of aneurysm.    These findings were discussed with Dr. CASIMIRO COLON at 3/20/2024 4:43 PM   by Dr. Jose A Deleon with read back confirmation.    --- End of Report ---        < end of copied text >    Echo: Pending   ======================================================  PAST MEDICAL & SURGICAL HISTORY:  Arthritis      Osteoporosis      Prolapsed uterus      Celiac sprue      Elective surgery  pessary placed  as outpatient  2016      H/O major orthopedic surgery  kell placed due to fx        **This patient requires a high level of care due to the complexity and severity of their condition which increases the amount and complexity of data to be reviewed and analyzed in addition to the risk of complications, morbidity and mortality of patient management      ====================ASSESSMENT ==============  1. 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 S/P TNK in ER at  -admit to MICU for Q1H Neurochecks  -post tNK order set initiated  -Goal -180  -Repeat head CT in am or if neuro exam worsens  -neurology consult  -Eventually will need MR Head   -hold A/C for 24 hrs, then start A/C and ASA if repeat head CT with no bleed  -SCD's b/l LE as condition allows  -Lipid profile, HbA1c  -Pending TTE with bubble study and EKG   -NPO, speech/swallow, OT, PT when cleared - usually 12 hours post TNK         PLAN DISCUSSED IN DETAIL WITH ICU ATTENDING DR. MERCADO WHO IS AGREEABLE TO THE ABOVE PLAN     DATE OF ENTRY OF THIS NOTE IS EQUAL TO THE DATE OF SERVICES RENDERED ****        Time spent on this patient encounter, which includes documenting this note in the electronic medical record, was 75 minutes including assessing the presenting problems with associated risks, reviewing medical records to prepare for the encounter, and meeting face to face with the patient to obtain additional history. I have also performed an appropriate physical exam, made interventions listed and ordered and interpreted appropriate diagnostic studies as documented. To improve communication and patient safety, I have coordinated care with the multidisciplinary team including the bedside nurse, appropriate attending of record and consultants as needed.   ALPA BHATIA  MRN-[id number]  Patient is a 88y old  Female who presents with a chief complaint of R sided weakness and slurred speech   HPI:  88 year-old F with PMHx  of supraventricular ectopy, palpitations, dyspnea, mild aortic insufficiency, mild carotid atherosclerosis, HLD, celiac disease, a hepatic cyst, a R kidney cyst, a cystocele, left thyroid nodules, osteoporosis, urinary stress incontinence (pessary) presents with slurred speech since 1330 today. As per , pt has been losing weight over the past months, GI appointment set up.     Pt presented to the ER and was noted to have aphasia, RUE slightly weaker, code stroke initiated in the ER     CTA Brain showed core infarct and ischemic penumbra in the left MCA territory: CBF<30% volume: 3 ml. Left MCA territory, suggesting core infarct. Tmax>6.0s volume: 33 ml. Mismatch volume: 30 ml, left MCA territory, suggesting ischemic penumbra. Mismatch ratio: 11. CT angiography neck: No hemodynamically significant stenosis of the bilateral cervical ICAs using NASCET criteria.  Patent vertebral arteries.  No evidence of vascular dissection.     CT angiography brain: Short segment occlusion of a left proximal M2 branch (304:304, 607:53, 604:159) with distal reconstitution. No evidence of aneurysm.     Telestroke was contacted and pt was a candidate for TNK which was initiated in the ER     On my exam pt had expressive aphasia, and some RUE weakness - but weakness had improved to about 4/5   Pt also noted to have R facial droop on my exam     Plan for monitoring in the ICU with q1h neuro checks         REVIEW OF SYSTEMS:    ROS not able to be obtained 2/2 to aphasia       Physical Exam:  Vital Signs Last 24 Hrs  T(C): 36.7 (20 Mar 2024 15:38), Max: 36.7 (20 Mar 2024 15:38)  T(F): 98 (20 Mar 2024 15:38), Max: 98 (20 Mar 2024 15:38)  HR: 64 (20 Mar 2024 15:38) (64 - 64)  BP: 194/84 (20 Mar 2024 16:31) (190/76 - 194/84)  BP(mean): --  RR: 18 (20 Mar 2024 16:31) (18 - 18)  SpO2: 96% (20 Mar 2024 16:31) (96% - 97%)    Parameters below as of 20 Mar 2024 16:31  Patient On (Oxygen Delivery Method): room air      Gen:  Awake, alert   CNS: BL LE 5/5 motor strength, RUE 4/5 motor strength   LUE 5/5 motor strength. + expressive aphasia + R sided facial droop   Neck: no JVD  RES : clear , no wheezing                      CVS: Regular  rhythm. Normal S1/S2  Abd: Soft, non-distended. Bowel sounds present.  Skin: No rash.  Ext:  no edema    ============================I/O===========================   I&O's Detail    ============================ LABS =========================                        14.2   6.64  )-----------( 323      ( 20 Mar 2024 16:05 )             43.5     03-20    144  |  106  |  12  ----------------------------<  117<H>  3.3<L>   |  32<H>  |  0.76    Ca    9.4      20 Mar 2024 16:05    TPro  7.0  /  Alb  3.6  /  TBili  0.5  /  DBili  x   /  AST  31  /  ALT  30  /  AlkPhos  72  03-20    LIVER FUNCTIONS - ( 20 Mar 2024 16:05 )  Alb: 3.6 g/dL / Pro: 7.0 g/dL / ALK PHOS: 72 U/L / ALT: 30 U/L / AST: 31 U/L / GGT: x           PT/INR - ( 20 Mar 2024 16:05 )   PT: 11.1 sec;   INR: 0.95 ratio         PTT - ( 20 Mar 2024 16:05 )  PTT:28.3 sec    Urinalysis Basic - ( 20 Mar 2024 16:05 )    Color: x / Appearance: x / SG: x / pH: x  Gluc: 117 mg/dL / Ketone: x  / Bili: x / Urobili: x   Blood: x / Protein: x / Nitrite: x   Leuk Esterase: x / RBC: x / WBC x   Sq Epi: x / Non Sq Epi: x / Bacteria: x      ======================Micro/Rad/Cardio=================    CT scan: < from: CT Angio Brain Stroke Protocol  w/ IV Cont (03.20.24 @ 16:21) >  IMPRESSION:    CT perfusion:  Core infarct and ischemic penumbra in the left MCA territory:  CBF<30% volume: 3 ml. Left MCA territory, suggesting core infarct.  Tmax>6.0s volume: 33 ml  Mismatch volume: 30 ml, left MCA territory, suggesting ischemic penumbra.  Mismatch ratio: 11    CT angiography neck: No hemodynamically significant stenosis of the   bilateral cervical ICAs using NASCET criteria.  Patent vertebral   arteries.  No evidence of vascular dissection.    CT angiography brain:  1.  Short segment occlusion of a left proximal M2 branch (304:304,   607:53, 604:159) with distal reconstitution.  2.  No evidence of aneurysm.    These findings were discussed with Dr. CASIMIRO COLON at 3/20/2024 4:43 PM   by Dr. Jose A Deleon with read back confirmation.    --- End of Report ---        < end of copied text >    Echo: Pending   ======================================================  PAST MEDICAL & SURGICAL HISTORY:  Arthritis      Osteoporosis      Prolapsed uterus      Celiac sprue      Elective surgery  pessary placed  as outpatient  2016      H/O major orthopedic surgery  kell placed due to fx        **This patient requires a high level of care due to the complexity and severity of their condition which increases the amount and complexity of data to be reviewed and analyzed in addition to the risk of complications, morbidity and mortality of patient management      ====================ASSESSMENT ==============  1. 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 S/P TNK in ER at  -admit to MICU for Q1H Neurochecks  -post tNK order set initiated  -Goal -180  -Repeat head CT in am or if neuro exam worsens  -neurology consult  -Eventually will need MR Head   -hold A/C for 24 hrs, then start A/C and ASA if repeat head CT with no bleed  -SCD's b/l LE as condition allows  -Lipid profile, HbA1c  -Pending TTE with bubble study and EKG   -NPO, speech/swallow, OT, PT when cleared - usually 12 hours post TNK         PLAN DISCUSSED IN DETAIL WITH ICU ATTENDING DR. MERCADO WHO IS AGREEABLE TO THE ABOVE PLAN     DATE OF ENTRY OF THIS NOTE IS EQUAL TO THE DATE OF SERVICES RENDERED ****        Time spent on this patient encounter, which includes documenting this note in the electronic medical record, was 75 minutes including assessing the presenting problems with associated risks, reviewing medical records to prepare for the encounter, and meeting face to face with the patient to obtain additional history. I have also performed an appropriate physical exam, made interventions listed and ordered and interpreted appropriate diagnostic studies as documented. To improve communication and patient safety, I have coordinated care with the multidisciplinary team including the bedside nurse, appropriate attending of record and consultants as needed.

## 2024-03-20 NOTE — ED ADULT NURSE NOTE - TEMPLATE LIST FOR HEAD TO TOE ASSESSMENT
In order to meet Medicare requirements, the clinical documentation must support the information cited in the admission order.  Please be sure to provide detailed and clear documentation about the following in the admitting note/history and physical:
Neuro

## 2024-03-20 NOTE — ED PROVIDER NOTE - CLINICAL SUMMARY MEDICAL DECISION MAKING FREE TEXT BOX
As per , pt has been losing weight over the past months, GI appointment set up   Under a lot of stress   no recent falls, infections, GI bleed,   denies fever, CP, SOB, abdominal pain,   no recent surgeries   does not think pt is on anticoagulations but pharmacy will confirm with pharmacy    NIH scale  Telestroke  Ddx includes but not limited to CVA, Intracranial bleed, hypoglycemia,   stroke set ordered 87 y/o female with hx of HTN, Celiac disease brought in by  for altered mental status, pt with slurred speech unable to perform tasks with her hands started around 1pm, as per triage nurse pt's symptoms improving,   As I walked into patient's room, pt aphasic, RUE slightly weaker, code stroke initiated, pt hypertensive  As per , pt has been losing weight over the past months, GI appointment set up   Under a lot of stress   no recent falls, infections, GI bleed,   denies fever, CP, SOB, abdominal pain,   no recent surgeries   does not think pt is on anticoagulations but pharmacy will confirm with pharmacy     does not think pt is on anticoagulations but pharmacy will confirm with pharmacy    NIH scale  Telestroke  Ddx includes but not limited to CVA, Intracranial bleed, hypoglycemia,   stroke set ordered    CXR no i/e, no widened mediastinum 87 y/o female with hx of HTN, Celiac disease brought in by  for altered mental status, pt with slurred speech unable to perform tasks with her hands started around 1pm, as per triage nurse pt's symptoms improving,   As I walked into patient's room, pt aphasic, RUE slightly weaker, code stroke initiated, pt hypertensive  As per , pt has been losing weight over the past months, GI appointment set up   Under a lot of stress   no recent falls, infections, GI bleed,   denies fever, CP, SOB, abdominal pain,   no recent surgeries   does not think pt is on anticoagulations but pharmacy will confirm with pharmacy    NIH scale  Telestroke  Ddx includes but not limited to CVA, Intracranial bleed, hypoglycemia,   stroke set ordered    On arrival back from CT pt reevaluated and pt's aphasia and RUE strength improving, video tele neurologist set up and Radiology results called in and pt's symptoms worsened with return of aphasia, R facial droop and decrease in  of RUE  Assessment reviewed with telestroke attending, awaiting BP lowering, and obtaining consent from  for TNK after confirmation of no anticoagulation from pharmacy before TNK administration.      CXR no i/e, no widened mediastinum  EKG NSR at 64bpm with nonspecific ST-T changes  Troponin elevated    Case discussed with Dr. Bustillo, A  Dr. Lisandro Ward  Cardiology

## 2024-03-20 NOTE — H&P ADULT - ASSESSMENT
89 y/o F with PMHx HLD, celiac disease and GERD presenting with slurred speech since around 1:30pm today. Admitted for CVA s/p TNK.

## 2024-03-20 NOTE — CONSULT NOTE ADULT - CRITICAL CARE ATTENDING COMMENT
Upon my evaluation, this patient is at high risk for imminent or life threatening deterioration due to CVA TNK  and other active medical issues which require my direct attention, intervention, and personal management.  I have personally spent >30 minutes  of critical care time exclusive of time spent on separate billing procedures. This includes review of laboratory data, radiology results, discussion with primary team\patient, and monitoring for potential decompensation. Interventions were performed as documented above.

## 2024-03-20 NOTE — CONSULT NOTE ADULT - NS ATTEND AMEND GEN_ALL_CORE FT
88 year-old F with PMH  of supraventricular ectopy, palpitations, dyspnea, mild aortic insufficiency, mild carotid atherosclerosis, HLD, celiac disease, a hepatic cyst, a R kidney cyst, a cystocele, left thyroid nodules, osteoporosis, urinary stress incontinence (pessary) presents with slurred speech, now s/p TNK      - CTA Brain showed core infarct and ischemic penumbra in the left MCA territory: CBF<30% volume: 3 ml. Left MCA territory, suggesting core infarct. Tmax>6.0s volume: 33 ml. Mismatch volume: 30 ml, left MCA territory, suggesting ischemic penumbra. Mismatch ratio: 11.   - CT angiography neck: No hemodynamically significant stenosis of the bilateral cervical ICAs using NASCET criteria.  Patent vertebral arteries.  No evidence of vascular dissection.   - CT angiography brain: Short segment occlusion of a left proximal M2 branch (304:304, 607:53, 604:159) with distal reconstitution. No evidence of aneurysm.   - S/P TNK in ER.   - Monitor in ICU  - Neurology following. For repeat CT in am  - Monitor in tele for occult AF   - ECHO 12/2023 with normal LV & RV size and function EF 57%, Trace MR, mild AR, PASP 27  - Please repeat echo with bubble study  - Permissive HTN per neurology and then gradual normotension.  - Give hydral 10mg IV if SBP>180 given got TNK    - EKG showed SR @ 64, LVH  - Troponin: <-402.0  - likely related to demand from CVA> cont to trend.     - Further cardiac workup will depend on clinical course.   ICU monitoring

## 2024-03-20 NOTE — ED PROVIDER NOTE - OBJECTIVE STATEMENT
87 y/o female with hx of HTN, Celiac disease brought in by  for altered mental status, pt with slurred speech unable to perform tasks with her hands started around 1pm, as per triage nurse pt's symptoms improving,   As I walked into patient's room, pt aphasic, RUE slightly weaker, code stroke initiated, pt hypertensive  As per , pt has been losing weight over the past months, GI appointment set up   Under a lot of stress   no recent falls, infections, GI bleed,   denies fever, CP, SOB, abdominal pain,   no recent surgeries   does not think pt is on anticoagulations but pharmacy will confirm with pharmacy 89 y/o female with hx of HTN, Celiac disease brought in by  for altered mental status, pt with slurred speech unable to perform tasks with her hands started around 1pm, as per triage nurse pt's symptoms improving,   As I walked into patient's room, pt aphasic, RUE slightly weaker, code stroke initiated, pt hypertensive  As per , pt has been losing weight over the past months, GI appointment set up   Under a lot of stress   no recent falls, infections, GI bleed,   denies fever, CP, SOB, abdominal pain,   no recent surgeries   does not think pt is on anticoagulations but pharmacist will confirm with pharmacy

## 2024-03-20 NOTE — CONSULT NOTE ADULT - ASSESSMENT
88 year-old F with PMH  of supraventricular ectopy, palpitations, dyspnea, mild aortic insufficiency, mild carotid atherosclerosis, HLD, celiac disease, a hepatic cyst, a R kidney cyst, a cystocele, left thyroid nodules, osteoporosis, urinary stress incontinence (pessary) presents with slurred speech, now s/p TNK    Stroke, HLD   - CTA Brain showed core infarct and ischemic penumbra in the left MCA territory: CBF<30% volume: 3 ml. Left MCA territory, suggesting core infarct. Tmax>6.0s volume: 33 ml. Mismatch volume: 30 ml, left MCA territory, suggesting ischemic penumbra. Mismatch ratio: 11.   - CT angiography neck: No hemodynamically significant stenosis of the bilateral cervical ICAs using NASCET criteria.  Patent vertebral arteries.  No evidence of vascular dissection.   - CT angiography brain: Short segment occlusion of a left proximal M2 branch (304:304, 607:53, 604:159) with distal reconstitution. No evidence of aneurysm.   - S/P TNK in ER.   - Monitor in ICU  - Neurology following. For repeat CT in am  - Monitor in tele  - Please obtain transthoracic echocardiogram to assess ventricular function, wall motion and valvular abnormalities.     - -190s  - S/p labetalol 10 mg IV x 1     - EKG showed   - Troponin: <-402.0  - Continue to trend, likely in the setting of CVA    - ECHO 12/2023 with normal LV & RV size and function EF 57%, Trace MR, mild AR, PASP 27  - No evidence of any meaningful volume overload     - Monitor and replete lytes, keep K>4, Mg>2.  - Will continue to follow.    Lynette Hussein, MS FNP, AGACNP  Nurse Practitioner- Cardiology   Please call on TEAMS   88 year-old F with PMH  of supraventricular ectopy, palpitations, dyspnea, mild aortic insufficiency, mild carotid atherosclerosis, HLD, celiac disease, a hepatic cyst, a R kidney cyst, a cystocele, left thyroid nodules, osteoporosis, urinary stress incontinence (pessary) presents with slurred speech, now s/p TNK    Stroke, HLD   - CTA Brain showed core infarct and ischemic penumbra in the left MCA territory: CBF<30% volume: 3 ml. Left MCA territory, suggesting core infarct. Tmax>6.0s volume: 33 ml. Mismatch volume: 30 ml, left MCA territory, suggesting ischemic penumbra. Mismatch ratio: 11.   - CT angiography neck: No hemodynamically significant stenosis of the bilateral cervical ICAs using NASCET criteria.  Patent vertebral arteries.  No evidence of vascular dissection.   - CT angiography brain: Short segment occlusion of a left proximal M2 branch (304:304, 607:53, 604:159) with distal reconstitution. No evidence of aneurysm.   - S/P TNK in ER.   - Monitor in ICU  - Neurology following. For repeat CT in am  - Monitor in tele  - Please obtain transthoracic echocardiogram to assess ventricular function, wall motion and valvular abnormalities.     - -190s  - S/p labetalol 10 mg IV x 1   - Permissive HTN per neurology and then gradual normotension.    - EKG showed SR @ 64, LVH  - Troponin: <-402.0  - Continue to trend, likely in the setting of CVA    - ECHO 12/2023 with normal LV & RV size and function EF 57%, Trace MR, mild AR, PASP 27  - No evidence of any meaningful volume overload     - Monitor and replete lytes, keep K>4, Mg>2.  - Will continue to follow.    Lynette Hussein, MS FNP, AGACNP  Nurse Practitioner- Cardiology   Please call on TEAMS   88 year-old F with PMH  of supraventricular ectopy, palpitations, dyspnea, mild aortic insufficiency, mild carotid atherosclerosis, HLD, celiac disease, a hepatic cyst, a R kidney cyst, a cystocele, left thyroid nodules, osteoporosis, urinary stress incontinence (pessary) presents with slurred speech, now s/p TNK    Stroke, HLD   - CTA Brain showed core infarct and ischemic penumbra in the left MCA territory: CBF<30% volume: 3 ml. Left MCA territory, suggesting core infarct. Tmax>6.0s volume: 33 ml. Mismatch volume: 30 ml, left MCA territory, suggesting ischemic penumbra. Mismatch ratio: 11.   - CT angiography neck: No hemodynamically significant stenosis of the bilateral cervical ICAs using NASCET criteria.  Patent vertebral arteries.  No evidence of vascular dissection.   - CT angiography brain: Short segment occlusion of a left proximal M2 branch (304:304, 607:53, 604:159) with distal reconstitution. No evidence of aneurysm.   - S/P TNK in ER.   - Monitor in ICU  - Neurology following. For repeat CT in am  - Monitor in tele for occult AF   - Please obtain transthoracic echocardiogram to assess ventricular function, wall motion and valvular abnormalities.     - -190s  - S/p labetalol 10 mg IV x 1   - Permissive HTN per neurology and then gradual normotension.  - Give hydral 10mg IV if SBP>180    - EKG showed SR @ 64, LVH  - Troponin: <-402.0  - Continue to trend, likely elevated in the setting of CVA    - ECHO 12/2023 with normal LV & RV size and function EF 57%, Trace MR, mild AR, PASP 27  - No evidence of any meaningful volume overload     - Monitor and replete lytes, keep K>4, Mg>2.  - Will continue to follow.    Lynette Hussein, MS FNP, AGACNP  Nurse Practitioner- Cardiology   Please call on TEAMS

## 2024-03-20 NOTE — H&P ADULT - NSHPPHYSICALEXAM_GEN_ALL_CORE
T(C): 36.6 (03-20-24 @ 18:30), Max: 36.7 (03-20-24 @ 15:38)  HR: 61 (03-20-24 @ 18:30) (61 - 67)  BP: 175/82 (03-20-24 @ 18:30) (136/63 - 194/84)  RR: 18 (03-20-24 @ 18:30) (18 - 18)  SpO2: 97% (03-20-24 @ 18:30) (96% - 98%)    gen: appears stated age, NAD  heent: nc/at, eomi, perrla, mmm  neck: supple  resp: cta b/l, no wheezes, rales or rhonchi  cardiac: +s1, s2, RRR, no murmurs appreciated  abd: soft, nt, nd, no rebound/guarding  mskt: no clubbing, cyanosis or edema of LE extremities  vasc: 2+ radial pulses  skin: warm and dry  neuro: a&ox3, aphasic  psych: normal mood, affect. normal behavior T(C): 36.6 (03-20-24 @ 18:30), Max: 36.7 (03-20-24 @ 15:38)  HR: 61 (03-20-24 @ 18:30) (61 - 67)  BP: 175/82 (03-20-24 @ 18:30) (136/63 - 194/84)  RR: 18 (03-20-24 @ 18:30) (18 - 18)  SpO2: 97% (03-20-24 @ 18:30) (96% - 98%)    gen: appears stated age, NAD  heent: nc/at, eomi, perrla, mmm  neck: supple  resp: cta b/l, no wheezes, rales or rhonchi  cardiac: +s1, s2, RRR, no murmurs appreciated  abd: soft, nt, nd, no rebound/guarding  mskt: no clubbing, cyanosis or edema of LE extremities  vasc: 2+ radial pulses  skin: warm and dry  neuro: a&ox3, expressive aphasia, +right sided facial droop, +slightly RUE weakness, sensation intact  psych: normal mood, affect. normal behavior

## 2024-03-20 NOTE — CONSULT NOTE ADULT - ASSESSMENT
Patient presented within tenecteplase window. LNW 1330, NIHSS of 5. Tenecteplase given.   No LVO on CTA, therefore no indications for thrombectomy.     To be admitted to ICU for post TNK management.     Wayne Jimenez MD  Stroke Fellow

## 2024-03-20 NOTE — CONSULT NOTE ADULT - SUBJECTIVE AND OBJECTIVE BOX
88F with PMH of HLD, celiac disease who presented with the slurred speech and mild right sided weakness. LKW 1330 3/20/2024. MRS of 1-2.  NIHSS of 5.      CTH with no acute changes.  CTP with left inferior MCA territory CBF<30% volume: 3 ml; and Tmax>6.0s volume: 33 ml, suggestive of acute ischemia.   CTA with no LVO; there is focal short severe stenosis of the left inferior m2 branch, however with good distal reconstitution.

## 2024-03-20 NOTE — ED ADULT NURSE NOTE - NSFALLUNIVINTERV_ED_ALL_ED
Bed/Stretcher in lowest position, wheels locked, appropriate side rails in place/Call bell, personal items and telephone in reach/Instruct patient to call for assistance before getting out of bed/chair/stretcher/Non-slip footwear applied when patient is off stretcher/[place] to call system/Physically safe environment - no spills, clutter or unnecessary equipment/Purposeful proactive rounding/Room/bathroom lighting operational, light cord in reach

## 2024-03-20 NOTE — CONSULT NOTE ADULT - SUBJECTIVE AND OBJECTIVE BOX
[facility] - Cadence, Tori, Marisol, Manohar, Carlos, Shannon, Michele; Office Number: [phone]    Initial Consult Note  CHIEF COMPLAINT: Patient is a 88y old  Female who presents with a chief complaint of stroke.     HPI: 88 year-old F with PMHx  of supraventricular ectopy, palpitations, dyspnea, mild aortic insufficiency, mild carotid atherosclerosis, HLD, celiac disease, a hepatic cyst, a R kidney cyst, a cystocele, left thyroid nodules, osteoporosis, urinary stress incontinence (pessary) presents with slurred speech since 1330 today. As per , pt has been losing weight over the past months, GI appointment set up. She is under a lot of stressPt aphasic, RUE slightly weaker, code stroke initiated. CTA Brain showed core infarct and ischemic penumbra in the left MCA territory: CBF<30% volume: 3 ml. Left MCA territory, suggesting core infarct. Tmax>6.0s volume: 33 ml. Mismatch volume: 30 ml, left MCA territory, suggesting ischemic penumbra. Mismatch ratio: 11. CT angiography neck: No hemodynamically significant stenosis of the   bilateral cervical ICAs using NASCET criteria.  Patent vertebral arteries.  No evidence of vascular dissection. CT angiography brain: Short segment occlusion of a left proximal M2 branch (304:304, 607:53, 604:159) with distal reconstitution. No evidence of aneurysm. S/P TNK in ER.     Allergies    No Known Drug Allergies  Gluten (Flatulence; Other; Diarrhea)    Intolerances      PAST MEDICAL & SURGICAL HISTORY:  Arthritis      Osteoporosis      Prolapsed uterus      Celiac sprue      Elective surgery  pessary placed  as outpatient  2016      H/O major orthopedic surgery  kell placed due to fx        MEDICATIONS  (STANDING):  sodium chloride 0.9% lock flush 10 milliLiter(s) IV Push once  sodium chloride 0.9% lock flush 10 milliLiter(s) IV Push once  tenecteplase 50 milliGRAM(s) Injectable (Rx Quick Charge). 38 milliGRAM(s) Waste   tenecteplase Injectable. 12 milliGRAM(s) IV Push. Once    MEDICATIONS  (PRN):    FAMILY HISTORY:     No family history of acute MI or sudden cardiac death.    SOCIAL HISTORY: No active tobacco, ethanol, or drug abuse.    REVIEW OF SYSTEMS   All other review of systems is negative unless indicated above.    VITAL SIGNS:   Vital Signs Last 24 Hrs  T(C): 36.7 (20 Mar 2024 17:15), Max: 36.7 (20 Mar 2024 15:38)  T(F): 98.1 (20 Mar 2024 17:15), Max: 98.1 (20 Mar 2024 17:15)  HR: 64 (20 Mar 2024 17:15) (61 - 64)  BP: 159/70 (20 Mar 2024 17:15) (136/63 - 194/84)  BP(mean): --  RR: 18 (20 Mar 2024 17:15) (18 - 18)  SpO2: 98% (20 Mar 2024 17:15) (96% - 98%)    Parameters below as of 20 Mar 2024 17:15  Patient On (Oxygen Delivery Method): room air        Physical Exam:  Constitutional: NAD, awake and alert  HEENT: Moist Mucous Membranes, Anicteric  Pulmonary: Non-labored, breath sounds are clear bilaterally, No wheezing, rales or rhonchi  Cardiovascular: Regular, S1 and S2, No murmurs, No rubs, gallops or clicks  Gastrointestinal: Bowel Sounds present, soft, nontender.   Lymph: No peripheral edema. No lymphadenopathy.  Skin: No visible rashes or ulcers.  Psych:  Mood & affect appropriate    I&O's Summary      LABS: All Labs Reviewed:                        14.2   6.64  )-----------( 323      ( 20 Mar 2024 16:05 )             43.5     20 Mar 2024 16:05    144    |  106    |  12     ----------------------------<  117    3.3     |  32     |  0.76     Ca    9.4        20 Mar 2024 16:05    TPro  7.0    /  Alb  3.6    /  TBili  0.5    /  DBili  x      /  AST  31     /  ALT  30     /  AlkPhos  72     20 Mar 2024 16:05    PT/INR - ( 20 Mar 2024 16:05 )   PT: 11.1 sec;   INR: 0.95 ratio         PTT - ( 20 Mar 2024 16:05 )  PTT:28.3 secTroponin I, High Sensitivity Result: 402.0 ng/L (03-20-24 @ 16:05)          TRANSTHORACIC ECHOCARDIOGRAM REPORT  ________________________________________________________________________________                                      _______       Pt. Name:       ALPA BHATIA Study Date:    12/26/2023  MRN:            [id number]         YOB: 1935  Accession #:    [id number]        Age:           88 years  Account#:       [id number]       Gender:        F  Heart Rate:                      Height:        59.06 in (150.00 cm)  Rhythm:Weight:        101.41 lb (46.00 kg)  Blood Pressure: 134/74 mmHg      BSA/BMI:       1.38 m² / 20.44 kg/m²  ________________________________________________________________________________________  Referring Physician:    [id number] Cheko Price  Interpreting Physician: Ever Valle  Primary Sonographer:    Leslye BELLO    CPT:               ECHO TTE WO CON COMP W DOPP - 71308.m  Indication(s):     Syncope and collapse - R55  Procedure:         Transthoracic echocardiogram with 2-D, M-mode and complete                     spectral and color flow Doppler.  Ordering Location: [facility]    _______________________________________________________________________________________     CONCLUSIONS:      1. Left ventricular cavity is small. Left ventricular wall thickness is normal. Left ventricular systolic function is normal with an ejection fraction of 57 % by Bourgeois's method of disks. There are no regional wall motion abnormalities seen.   2. Normal right ventricular cavity size and systolic function.   3. Trace mitral regurgitation.   4. Trileaflet aortic valve with normal systolic excursion.   5. Mild aortic regurgitation.   6. Estimated pulmonary artery systolic pressure is 27 mmHg.   7. The inferior vena cava is normal in size (normal<2.1cm) with normal inspiratory collapse (normal >50%) consistent with normal right atrial pressure (~3, range 0-5mmHg).    ________________________________________________________________________________________  FINDINGS:     Left Ventricle:  The left ventricular cavity is small. Left ventricular wall thickness is normal. Left ventricular systolic function is normal with a calculated ejection fraction of 57 % by the Bourgeois's biplane method of disks. There are no regional wall motion abnormalities seen. There is normal left ventricular diastolic function, with normal filling pressure.     Right Ventricle:  The right ventricular cavity is normal in size and normal systolic function.     Left Atrium:  The left atrium is normal.     Right Atrium:  The right atrium is normal in size.     Aortic Valve:  The aortic valve appears trileaflet with normal systolic excursion. There is mild aortic regurgitation.     Mitral Valve:  There is mitral valve thickening of the anterior and posterior leaflets. There is trace mitral regurgitation.     Tricuspid Valve:  Structurally normal tricuspid valve with normal leaflet excursion. There is trace tricuspid regurgitation. Estimated pulmonary artery systolic pressure is 27 mmHg, consistent with normal pulmonary artery pressure.     Pulmonic Valve:  The pulmonic valve was not well visualized.     Pericardium:  No pericardial effusion seen.     Systemic Veins:  The inferior vena cava is normal in size (normal <2.1cm) with normal inspiratory collapse (normal >50%) consistent with normal right atrial pressure (~3, range 0-5mmHg).  ____________________________________________________________________  QUANTITATIVE DATA:  Left Ventricle Measurements: (Indexed to BSA)     IVSd (2D):   0.8 cm  LVPWd (2D):  1.0 cm  LVIDd (2D):  3.2 cm  LVIDs (2D):  2.3 cm  LV Mass:     74 g   53.8 g/m²  BiPlane LV EF%: 57 %     MV E Vmax:    0.76 m/s  MV A Vmax:    0.85 m/s  MV E/A:       0.89  e' lateral:   7.40 cm/s  e' medial:    6.31 cm/s  E/e' lateral: 10.20  E/e' medial:  11.97  E/e' Average: 11.01  MV DT:        272 msec    Aorta Measurements: (normal range) (Indexed to BSA)     Sinuses of Valsalva: 2.60 cm (2.7 - 3.3 cm)       Left Atrium Measurements: (Indexed to BSA)  LA Diam 2D:        2.80 cm  LA Vol s, MOD A4C: 27.20 ml.    Right Ventricle Measurements:     RV Base (RVID1): 4.1 cm       LVOT / RVOT/ Qp/Qs Data: (Indexed to BSA)  LVOT Diameter: 1.70 cm    Aortic Valve Measurements:  AR Vmax  3.67 m/s  AR PHT   350 msec  AR [place] 3.08 m/s²    Mitral Valve Measurements:     MV E Vmax: 0.8 m/s  MV A Vmax: 0.8 m/s  MV E/A:    0.9       Tricuspid Valve Measurements:     TR Vmax:          2.4 m/s  TR Peak Gradient: 24.0 mmHg  RA Pressure:      3 mmHg  PASP:             27 mmHg    ________________________________________________________________________________________  Electronically signed on 12/26/2023 at 6:00:24 PM by Ever Valle         *** Final ***   [facility] - Tori Vila, Marisol, Manohar, Carlos, Shannon, Michele; Office Number: [phone]    Initial Consult Note  CHIEF COMPLAINT: Patient is a 88y old  Female who presents with a chief complaint of stroke.     HPI: 88 year-old F with PMHx  of supraventricular ectopy, palpitations, dyspnea, mild aortic insufficiency, mild carotid atherosclerosis, HLD, celiac disease, a hepatic cyst, a R kidney cyst, a cystocele, left thyroid nodules, osteoporosis, urinary stress incontinence (pessary) presents with slurred speech since 1330 today. As per , pt has been losing weight over the past months, GI appointment set up. She is under a lot of stressPt aphasic, RUE slightly weaker, code stroke initiated. CTA Brain showed core infarct and ischemic penumbra in the left MCA territory: CBF<30% volume: 3 ml. Left MCA territory, suggesting core infarct. Tmax>6.0s volume: 33 ml. Mismatch volume: 30 ml, left MCA territory, suggesting ischemic penumbra. Mismatch ratio: 11. CT angiography neck: No hemodynamically significant stenosis of the   bilateral cervical ICAs using NASCET criteria.  Patent vertebral arteries.  No evidence of vascular dissection. CT angiography brain: Short segment occlusion of a left proximal M2 branch (304:304, 607:53, 604:159) with distal reconstitution. No evidence of aneurysm. S/P TNK in ER. Patient seen and examined. Patient awake, alert, resting in bed.  at bedside. RN reports slight improvement since TNK.     Allergies    No Known Drug Allergies  Gluten (Flatulence; Other; Diarrhea)    Intolerances      PAST MEDICAL & SURGICAL HISTORY:  Arthritis      Osteoporosis      Prolapsed uterus      Celiac sprue      Elective surgery  pessary placed  as outpatient  2016      H/O major orthopedic surgery  kell placed due to fx        MEDICATIONS  (STANDING):  sodium chloride 0.9% lock flush 10 milliLiter(s) IV Push once  sodium chloride 0.9% lock flush 10 milliLiter(s) IV Push once  tenecteplase 50 milliGRAM(s) Injectable (Rx Quick Charge). 38 milliGRAM(s) Waste   tenecteplase Injectable. 12 milliGRAM(s) IV Push. Once    MEDICATIONS  (PRN):    FAMILY HISTORY:     No family history of acute MI or sudden cardiac death.    SOCIAL HISTORY: No active tobacco, ethanol, or drug abuse.    REVIEW OF SYSTEMS   All other review of systems is negative unless indicated above.    VITAL SIGNS:   Vital Signs Last 24 Hrs  T(C): 36.7 (20 Mar 2024 17:15), Max: 36.7 (20 Mar 2024 15:38)  T(F): 98.1 (20 Mar 2024 17:15), Max: 98.1 (20 Mar 2024 17:15)  HR: 64 (20 Mar 2024 17:15) (61 - 64)  BP: 159/70 (20 Mar 2024 17:15) (136/63 - 194/84)  BP(mean): --  RR: 18 (20 Mar 2024 17:15) (18 - 18)  SpO2: 98% (20 Mar 2024 17:15) (96% - 98%)    Parameters below as of 20 Mar 2024 17:15  Patient On (Oxygen Delivery Method): room air        Physical Exam:  Constitutional: NAD, awake and alert  HEENT: Moist Mucous Membranes, Anicteric  Pulmonary: Non-labored, breath sounds are clear bilaterally, No wheezing, rales or rhonchi  Cardiovascular: Regular, S1 and S2, No murmurs, No rubs, gallops or clicks  Gastrointestinal: Bowel Sounds present, soft, nontender.   Lymph: No peripheral edema. No lymphadenopathy.  Skin: No visible rashes or ulcers.  Psych:  Mood & affect appropriate    I&O's Summary      LABS: All Labs Reviewed:                        14.2   6.64  )-----------( 323      ( 20 Mar 2024 16:05 )             43.5     20 Mar 2024 16:05    144    |  106    |  12     ----------------------------<  117    3.3     |  32     |  0.76     Ca    9.4        20 Mar 2024 16:05    TPro  7.0    /  Alb  3.6    /  TBili  0.5    /  DBili  x      /  AST  31     /  ALT  30     /  AlkPhos  72     20 Mar 2024 16:05    PT/INR - ( 20 Mar 2024 16:05 )   PT: 11.1 sec;   INR: 0.95 ratio         PTT - ( 20 Mar 2024 16:05 )  PTT:28.3 secTroponin I, High Sensitivity Result: 402.0 ng/L (03-20-24 @ 16:05)          TRANSTHORACIC ECHOCARDIOGRAM REPORT  ________________________________________________________________________________                                      _______       Pt. Name:       ALPA BHATIA Study Date:    12/26/2023  MRN:            [id number]         YOB: 1935  Accession #:    [id number]        Age:           88 years  Account#:       [id number]       Gender:        F  Heart Rate:                      Height:        59.06 in (150.00 cm)  Rhythm:Weight:        101.41 lb (46.00 kg)  Blood Pressure: 134/74 mmHg      BSA/BMI:       1.38 m² / 20.44 kg/m²  ________________________________________________________________________________________  Referring Physician:    [id number] Cheko Price  Interpreting Physician: Ever Valle  Primary Sonographer:    Leslye BELLO    CPT:               ECHO TTE WO CON COMP W DOPP - 76435.m  Indication(s):     Syncope and collapse - R55  Procedure:         Transthoracic echocardiogram with 2-D, M-mode and complete                     spectral and color flow Doppler.  Ordering Location: [facility]    _______________________________________________________________________________________     CONCLUSIONS:      1. Left ventricular cavity is small. Left ventricular wall thickness is normal. Left ventricular systolic function is normal with an ejection fraction of 57 % by Bourgeois's method of disks. There are no regional wall motion abnormalities seen.   2. Normal right ventricular cavity size and systolic function.   3. Trace mitral regurgitation.   4. Trileaflet aortic valve with normal systolic excursion.   5. Mild aortic regurgitation.   6. Estimated pulmonary artery systolic pressure is 27 mmHg.   7. The inferior vena cava is normal in size (normal<2.1cm) with normal inspiratory collapse (normal >50%) consistent with normal right atrial pressure (~3, range 0-5mmHg).    ________________________________________________________________________________________  FINDINGS:     Left Ventricle:  The left ventricular cavity is small. Left ventricular wall thickness is normal. Left ventricular systolic function is normal with a calculated ejection fraction of 57 % by the Bourgeois's biplane method of disks. There are no regional wall motion abnormalities seen. There is normal left ventricular diastolic function, with normal filling pressure.     Right Ventricle:  The right ventricular cavity is normal in size and normal systolic function.     Left Atrium:  The left atrium is normal.     Right Atrium:  The right atrium is normal in size.     Aortic Valve:  The aortic valve appears trileaflet with normal systolic excursion. There is mild aortic regurgitation.     Mitral Valve:  There is mitral valve thickening of the anterior and posterior leaflets. There is trace mitral regurgitation.     Tricuspid Valve:  Structurally normal tricuspid valve with normal leaflet excursion. There is trace tricuspid regurgitation. Estimated pulmonary artery systolic pressure is 27 mmHg, consistent with normal pulmonary artery pressure.     Pulmonic Valve:  The pulmonic valve was not well visualized.     Pericardium:  No pericardial effusion seen.     Systemic Veins:  The inferior vena cava is normal in size (normal <2.1cm) with normal inspiratory collapse (normal >50%) consistent with normal right atrial pressure (~3, range 0-5mmHg).  ____________________________________________________________________  QUANTITATIVE DATA:  Left Ventricle Measurements: (Indexed to BSA)     IVSd (2D):   0.8 cm  LVPWd (2D):  1.0 cm  LVIDd (2D):  3.2 cm  LVIDs (2D):  2.3 cm  LV Mass:     74 g   53.8 g/m²  BiPlane LV EF%: 57 %     MV E Vmax:    0.76 m/s  MV A Vmax:    0.85 m/s  MV E/A:       0.89  e' lateral:   7.40 cm/s  e' medial:    6.31 cm/s  E/e' lateral: 10.20  E/e' medial:  11.97  E/e' Average: 11.01  MV DT:        272 msec    Aorta Measurements: (normal range) (Indexed to BSA)     Sinuses of Valsalva: 2.60 cm (2.7 - 3.3 cm)       Left Atrium Measurements: (Indexed to BSA)  LA Diam 2D:        2.80 cm  LA Vol s, MOD A4C: 27.20 ml.    Right Ventricle Measurements:     RV Base (RVID1): 4.1 cm       LVOT / RVOT/ Qp/Qs Data: (Indexed to BSA)  LVOT Diameter: 1.70 cm    Aortic Valve Measurements:  AR Vmax  3.67 m/s  AR PHT   350 msec  AR [place] 3.08 m/s²    Mitral Valve Measurements:     MV E Vmax: 0.8 m/s  MV A Vmax: 0.8 m/s  MV E/A:    0.9       Tricuspid Valve Measurements:     TR Vmax:          2.4 m/s  TR Peak Gradient: 24.0 mmHg  RA Pressure:      3 mmHg  PASP:             27 mmHg    ________________________________________________________________________________________  Electronically signed on 12/26/2023 at 6:00:24 PM by Ever Valle         *** Final ***

## 2024-03-20 NOTE — ED PROVIDER NOTE - PHYSICAL EXAMINATION
GENERAL: pt unable to speak in mild distress  HEAD:  atraumatic  HEENT: normal conjunctiva, oral mucosa moist, PERRLA, unab lr   CARDIAC: regular rate and rhythm, no appreciable murmurs, 2+ pulses in UE/LE b/l  PULM:  normal breath sounds, clear to ascultation bilaterally, no rales, rhonchi, wheezing  GI: abdomen nondistended, soft, nontender, no guarding, rebound tenderness  NEURO: aphasic, RUE weakness, R lateral visual defect  MSK: + 1  nonpitting peripheral edema, no calf tenderness b/l  SKIN: well-perfused, extremities warm, no visible rashes  PSYCH: appropriate mood and affect

## 2024-03-20 NOTE — PATIENT PROFILE ADULT - FALL HARM RISK - HARM RISK INTERVENTIONS

## 2024-03-20 NOTE — H&P ADULT - PROBLEM SELECTOR PLAN 1
Admit to ICU s/p TNK @ 1700  - CT head:  - Bedside dysphagia screen - will advance diet and request formal speech /swallow eval for tomorrow  - Allow permissive HTN for first 48 hours, will not treat unless SBP>180 or DBP>105  -repeat CT 24 hrs after TNK or sooner if neurological status changes  - F/u MRI brain, TTE, Carotid dopplers  - F/u A1c, lipid panel, TSH, B12 , folate  - Aspiration precautions  - NIHSS  - Neuro checks Q1H  - High dose statin. Consider starting AC and high dose asa pending neuro recs   - PT/OT eval  - Neuro consulted Dr. Davidson, f/u recs  - Cardio consulted [facility], f/u recs  - Plan as per ICU Admit to ICU s/p TNK @ 1700  - CT head: Short segment occlusion of a left proximal M2 branch   - Bedside dysphagia screen failed will keep NPO for now and request formal speech /swallow eval for tomorrow  - s/p Labetalol 10mg IV x1 in ED in order to meet BP threshold for TNK administration  - Allow permissive HTN for first 48 hours, will not treat unless SBP>180 or DBP>105  - Repeat CT 24 hrs after TNK or sooner if neurological status changes  - F/u MRI brain and TTE w/ bubble study  - F/u A1c, lipid panel, TSH, B12 , folate  - Aspiration precautions  - Neuro checks Q1H  - High dose statin once no longer NPO. Consider starting AC and high dose asa pending neuro recs   - PT/OT eval  -Teleneuro (Dr. Jimenez) consulted by the ED -- TNK recommended as per teleneuro -- no need for neuro intervention procedure  - Neuro consulted f/u recs  - Cardio consulted [facility], f/u recs  - Plan as per ICU Admit to ICU s/p TNK @ 1700  - CT head: Short segment occlusion of a left proximal M2 branch   - Bedside dysphagia screen failed will keep NPO for now and request formal speech /swallow eval for tomorrow  - s/p Labetalol 10mg IV x1 in ED in order to meet BP threshold for TNK administration  - Allow permissive HTN for first 48 hours, will not treat unless SBP>180 or DBP>105  - Repeat CT 24 hrs after TNK or sooner if neurological status changes  - F/u MRI brain and TTE w/ bubble study  - F/u A1c, lipid panel, TSH, B12 , folate  - Aspiration precautions  - Neuro checks Q1H  - High dose statin once no longer NPO. Consider starting AC and high dose asa pending neuro recs   - PT/OT eval  -Teleneuro (Dr. Jimenez) consulted by the ED -- TNK recommended as per teleneuro -- no need for neuro intervention procedure  - Neuro consulted Dr. Davidson, f/u recs  - Cardio consulted [facility], f/u recs  - Plan as per ICU

## 2024-03-20 NOTE — H&P ADULT - ATTENDING COMMENTS
89 y/o F with PMHx HLD, celiac disease and GERD presenting with slurred speech since around 1:30pm today. Admitted for CVA s/p TNK.    HPI as above.     T(C): 36.6 (03-20-24 @ 18:30), Max: 36.7 (03-20-24 @ 15:38)  HR: 61 (03-20-24 @ 18:30) (61 - 67)  BP: 175/82 (03-20-24 @ 18:30) (136/63 - 194/84)  RR: 18 (03-20-24 @ 18:30) (18 - 18)  SpO2: 97% (03-20-24 @ 18:30) (96% - 98%)  Wt(kg): --    Physical Exam:   GENERAL: well-groomed, well-developed, NAD  HEENT: head NC/AT; EOM intact, PERRLA, conjunctiva & sclera clear; hearing grossly intact, moist mucous membranes  NECK: supple, no JVD  RESPIRATORY: CTA B/L, no wheezing, rales, rhonchi or rubs  CARDIOVASCULAR: S1&S2, RRR, no murmurs or gallops  ABDOMEN: soft, non-tender, non-distended, + Bowel sounds x4 quadrants, no guarding, rebound or rigidity  MUSCULOSKELETAL:  no clubbing, cyanosis or edema of all 4 extremities  LYMPH: no cervical lymphadenopathy  VASCULAR: Radial pulses 2+ bilaterally, no varicose veins   SKIN: warm and dry, color normal  NEUROLOGIC: awake and alert, expressive aphasia, right sided facial droop, otherwise CN 2-12 intact, decreased right finger  strength, decreased muscle strength RUE, LUE/RLE/LLE 5/5 muscle strength    Plan:  CVA s/p TNK  -not large vessel occlusion, M2 occlusion noted- discussed with teleneuro Dr Jimenez, no neurointerventionan needed  -BP goals as stated.   -CT head shows Core infarct and ischemic penumbra in the left MCA territory:  -continue statin when can take PO-failed dysphagia screen  -start ASA when able      Elevated Trop: likely due to demand ischemia  -check TTE, monitor on tele  -repeat cardiac enzymes    Hypokalemia: replete  DVT ppx: SCD's

## 2024-03-20 NOTE — CONSULT NOTE ADULT - NS PANP COMMENT GEN_ALL_CORE FT
89 yo woman with Hx SVT, HLD, celiac disease presenting with slurred speech and R sided weakness worrisome for CVA.  Received TNK in ED.  Now with improvement in speech and R sided weakness seems resolved.  post-TNK monitoring per protocol  no antiplatlets for 24h post TNK  statin when PO access  permissive htn  echo  check lipids, A1c  PT, OT, speech

## 2024-03-20 NOTE — H&P ADULT - HISTORY OF PRESENT ILLNESS
89 y/o F with PMHx HLD, celiac disease and GERD presenting with slurred speech since around 1:30pm today. Pt was sitting at the kitchen table with her  when he noticed she was staring off into space and unable to respond appropriately to questioning with slurred speech. Pt has no prior hx of stroke. Pt reports headache at this time. Denies cp, sob.  at bedside providing collateral history.    IN THE ED:  Temp  98 F , HR 64 ,  /76  ,RR 18 , SpO2 97% RA  S/P Labetalol 10mg IV x1, TNK @ 1700  EKG: NSR @ 64bpm  Labs significant for: K 3.3, Trop 402  Imaging:  CT Brain Stroke Protocol -   CT perfusion:Core infarct and ischemic penumbra in the left MCA territory:  CBF<30% volume: 3 ml. Left MCA territory, suggesting core infarct.  Tmax>6.0s volume: 33 ml  Mismatch volume: 30 ml, left MCA territory, suggesting ischemic penumbra.  Mismatch ratio: 11    CT angiography neck: No hemodynamically significant stenosis of the   bilateral cervical ICAs using NASCET criteria.  Patent vertebral   arteries.  No evidence of vascular dissection.    CT angiography brain:  1.  Short segment occlusion of a left proximal M2 branch (304:304,   607:53, 604:159) with distal reconstitution.  2.  No evidence of aneurysm.   89 y/o F with PMHx HLD, celiac disease and GERD presenting with slurred speech since around 1:30pm today. Pt was sitting at the kitchen table with her  when he noticed she was staring off into space and unable to respond appropriately to questioning with slurred speech. Pt has no prior hx of stroke. Pt reports headache at this time. Denies cp, sob.  at bedside providing collateral history.    IN THE ED:  Temp  98 F , HR 64 ,  /76  ,RR 18 , SpO2 97% RA  S/P Labetalol 10mg IV x1, TNK @ 1700  EKG: NSR @ 64bpm  Labs significant for: K 3.3, Trop 402  Imaging:  CT Brain Stroke Protocol -   CT perfusion:Core infarct and ischemic penumbra in the left MCA territory:  CBF<30% volume: 3 ml. Left MCA territory, suggesting core infarct.  Tmax>6.0s volume: 33 ml  Mismatch volume: 30 ml, left MCA territory, suggesting ischemic penumbra.  Mismatch ratio: 11    CT angiography neck: No hemodynamically significant stenosis of the   bilateral cervical ICAs using NASCET criteria.  Patent vertebral   arteries.  No evidence of vascular dissection.    CT angiography brain:  1.  Short segment occlusion of a left proximal M2 branch (304:304,   607:53, 604:159) with distal reconstitution.  2.  No evidence of aneurysm.

## 2024-03-20 NOTE — H&P ADULT - NSHPREVIEWOFSYSTEMS_GEN_ALL_CORE
REVIEW OF SYSTEMS:  Constitutional: denies fever, chills, diaphoresis  HEENT: denies sore throat, runny nose, blurry vision, double vision    Respiratory: denies SOB, FRAGA, cough, wheezing, hemoptysis  Cardiovascular: denies CP, palpitations, LE edema  Gastrointestinal:  denies nausea, vomiting, diarrhea, constipation, abdominal pain, melena, hematochezia   Genitourinary: denies dysuria, hematuria  Skin/Breast: denies rash, hives, itching   Musculoskeletal: denies myalgias, arthritis, joint swelling, muscle weakness  Neurologic: +RUE slightly weaker, +headache, denies syncope, LOC, dizziness

## 2024-03-20 NOTE — ED ADULT NURSE NOTE - GLASGOW COMA SCALE: BEST MOTOR RESPONSE
Per pharamacy request, **Patient requests 90 days supply**     Disp Refills Start End YUNIOR   fluticasone (FLONASE) 50 MCG/ACT nasal spray 18.2 mL 11 12/5/2022  No   Sig - Route: Spray 2 sprays into both nostrils daily - Both Nostrils   Sent to pharmacy as: Fluticasone Propionate 50 MCG/ACT Nasal Suspension (FLONASE)   Class: E-Prescribe   Order: [id number]   E-Prescribing Status: Receipt confirmed by pharmacy (12/5/2022  9:27 AM CST)       [id number] - [place], [place] - [address]       
(M6) obeys commands

## 2024-03-20 NOTE — H&P ADULT - NSHPSOCIALHISTORY_GEN_ALL_CORE
Lives: with   ADLs: independent, still driving  Diet: gluten free  Alcohol Use: no  Tobacco Use: never  Recreational Drug Use: no

## 2024-03-20 NOTE — PATIENT PROFILE ADULT - NSTRANSFERBELONGINGSRESP_GEN_A_NUR
Date of Service: 08/21/2017    SUBJECTIVE:  A 62-year-old here for 4-month followup of his diabetes and other health problems.  He really has been putting a lot effort into his health and lifestyle.  He takes Metformin 1000 mg b.i.d. and Lantus.  He really cannot tell me much about his blood sugars.  He has not had any hypoglycemia.  He has tried taking some fat burning product for the last month, but has not had any improvement.  He does have quite a bit of diarrhea and he thinks it might be due to that product, although he also drinks a lot of soda.  He has a salad every day.  Denies any chest pains or shortness of breath.  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    PHYSICAL EXAMINATION:  VITAL SIGNS:  His weight is stable.  Blood pressure is 128/82.  LUNGS:  Clear.  HEART:  Regular rate and rhythm.  EXTREMITIES:  About 1+ edema.  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      DATA:   A1c before this visit was 8.7, up from 8.4 and up from 7.1 about a year ago.    ASSESSMENT AND PLAN:  Diabetes, poor control.  The patient is not motivated to do lifestyle things that he needs to do.  We talked about adding more medication, but ultimately decided to leave things as they were.  Will see him back in 4 months with previsit labs.  He clearly knows the importance of diabetic control given that he has had several complications already.      Dictated By: Devan Sotelo MD  Signing Provider: MD UCHE Chino/casper ([id number])  DD: 08/21/2017 13:38:28 TD: 08/22/2017 07:01:04    Copy Sent To:   
yes

## 2024-03-21 ENCOUNTER — RESULT REVIEW (OUTPATIENT)
Age: 89
End: 2024-03-21

## 2024-03-21 ENCOUNTER — APPOINTMENT (OUTPATIENT)
Dept: GASTROENTEROLOGY | Facility: CLINIC | Age: 89
End: 2024-03-21

## 2024-03-21 DIAGNOSIS — K90.0 CELIAC DISEASE: ICD-10-CM

## 2024-03-21 LAB
A1C WITH ESTIMATED AVERAGE GLUCOSE RESULT: 5.5 % — SIGNIFICANT CHANGE UP (ref 4–5.6)
ALBUMIN SERPL ELPH-MCNC: 3.2 G/DL — LOW (ref 3.3–5)
ALP SERPL-CCNC: 67 U/L — SIGNIFICANT CHANGE UP (ref 40–120)
ALT FLD-CCNC: 39 U/L — SIGNIFICANT CHANGE UP (ref 12–78)
ANION GAP SERPL CALC-SCNC: 6 MMOL/L — SIGNIFICANT CHANGE UP (ref 5–17)
AST SERPL-CCNC: 34 U/L — SIGNIFICANT CHANGE UP (ref 15–37)
BASOPHILS # BLD AUTO: 0.06 K/UL — SIGNIFICANT CHANGE UP (ref 0–0.2)
BASOPHILS NFR BLD AUTO: 1 % — SIGNIFICANT CHANGE UP (ref 0–2)
BILIRUB SERPL-MCNC: 0.6 MG/DL — SIGNIFICANT CHANGE UP (ref 0.2–1.2)
BUN SERPL-MCNC: 8 MG/DL — SIGNIFICANT CHANGE UP (ref 7–23)
CALCIUM SERPL-MCNC: 9.2 MG/DL — SIGNIFICANT CHANGE UP (ref 8.5–10.1)
CHLORIDE SERPL-SCNC: 108 MMOL/L — SIGNIFICANT CHANGE UP (ref 96–108)
CHOLEST SERPL-MCNC: 148 MG/DL — SIGNIFICANT CHANGE UP
CO2 SERPL-SCNC: 34 MMOL/L — HIGH (ref 22–31)
CREAT SERPL-MCNC: 0.61 MG/DL — SIGNIFICANT CHANGE UP (ref 0.5–1.3)
CULTURE RESULTS: SIGNIFICANT CHANGE UP
EGFR: 86 ML/MIN/1.73M2 — SIGNIFICANT CHANGE UP
EOSINOPHIL # BLD AUTO: 0.16 K/UL — SIGNIFICANT CHANGE UP (ref 0–0.5)
EOSINOPHIL NFR BLD AUTO: 2.8 % — SIGNIFICANT CHANGE UP (ref 0–6)
ESTIMATED AVERAGE GLUCOSE: 111 MG/DL — SIGNIFICANT CHANGE UP (ref 68–114)
GLUCOSE SERPL-MCNC: 87 MG/DL — SIGNIFICANT CHANGE UP (ref 70–99)
HCT VFR BLD CALC: 41.4 % — SIGNIFICANT CHANGE UP (ref 34.5–45)
HDLC SERPL-MCNC: 62 MG/DL — SIGNIFICANT CHANGE UP
HGB BLD-MCNC: 13.7 G/DL — SIGNIFICANT CHANGE UP (ref 11.5–15.5)
IMM GRANULOCYTES NFR BLD AUTO: 0.2 % — SIGNIFICANT CHANGE UP (ref 0–0.9)
LIPID PNL WITH DIRECT LDL SERPL: 75 MG/DL — SIGNIFICANT CHANGE UP
LYMPHOCYTES # BLD AUTO: 1.36 K/UL — SIGNIFICANT CHANGE UP (ref 1–3.3)
LYMPHOCYTES # BLD AUTO: 23.7 % — SIGNIFICANT CHANGE UP (ref 13–44)
MAGNESIUM SERPL-MCNC: 2 MG/DL — SIGNIFICANT CHANGE UP (ref 1.6–2.6)
MCHC RBC-ENTMCNC: 31.3 PG — SIGNIFICANT CHANGE UP (ref 27–34)
MCHC RBC-ENTMCNC: 33.1 GM/DL — SIGNIFICANT CHANGE UP (ref 32–36)
MCV RBC AUTO: 94.5 FL — SIGNIFICANT CHANGE UP (ref 80–100)
MONOCYTES # BLD AUTO: 0.87 K/UL — SIGNIFICANT CHANGE UP (ref 0–0.9)
MONOCYTES NFR BLD AUTO: 15.2 % — HIGH (ref 2–14)
MRSA PCR RESULT.: SIGNIFICANT CHANGE UP
NEUTROPHILS # BLD AUTO: 3.27 K/UL — SIGNIFICANT CHANGE UP (ref 1.8–7.4)
NEUTROPHILS NFR BLD AUTO: 57.1 % — SIGNIFICANT CHANGE UP (ref 43–77)
NON HDL CHOLESTEROL: 86 MG/DL — SIGNIFICANT CHANGE UP
NRBC # BLD: 0 /100 WBCS — SIGNIFICANT CHANGE UP (ref 0–0)
PHOSPHATE SERPL-MCNC: 3.5 MG/DL — SIGNIFICANT CHANGE UP (ref 2.5–4.5)
PLATELET # BLD AUTO: 308 K/UL — SIGNIFICANT CHANGE UP (ref 150–400)
POTASSIUM SERPL-MCNC: 3.2 MMOL/L — LOW (ref 3.5–5.3)
POTASSIUM SERPL-SCNC: 3.2 MMOL/L — LOW (ref 3.5–5.3)
PROT SERPL-MCNC: 6.5 G/DL — SIGNIFICANT CHANGE UP (ref 6–8.3)
RBC # BLD: 4.38 M/UL — SIGNIFICANT CHANGE UP (ref 3.8–5.2)
RBC # FLD: 13.3 % — SIGNIFICANT CHANGE UP (ref 10.3–14.5)
S AUREUS DNA NOSE QL NAA+PROBE: SIGNIFICANT CHANGE UP
SODIUM SERPL-SCNC: 148 MMOL/L — HIGH (ref 135–145)
SPECIMEN SOURCE: SIGNIFICANT CHANGE UP
TRIGL SERPL-MCNC: 53 MG/DL — SIGNIFICANT CHANGE UP
WBC # BLD: 5.73 K/UL — SIGNIFICANT CHANGE UP (ref 3.8–10.5)
WBC # FLD AUTO: 5.73 K/UL — SIGNIFICANT CHANGE UP (ref 3.8–10.5)

## 2024-03-21 PROCEDURE — 99233 SBSQ HOSP IP/OBS HIGH 50: CPT | Mod: GC

## 2024-03-21 PROCEDURE — 70544 MR ANGIOGRAPHY HEAD W/O DYE: CPT | Mod: 26,XU

## 2024-03-21 PROCEDURE — 70450 CT HEAD/BRAIN W/O DYE: CPT | Mod: 26

## 2024-03-21 PROCEDURE — 99233 SBSQ HOSP IP/OBS HIGH 50: CPT

## 2024-03-21 PROCEDURE — 93306 TTE W/DOPPLER COMPLETE: CPT | Mod: 26

## 2024-03-21 PROCEDURE — 70551 MRI BRAIN STEM W/O DYE: CPT | Mod: 26

## 2024-03-21 RX ORDER — ACETAMINOPHEN 500 MG
650 TABLET ORAL EVERY 6 HOURS
Refills: 0 | Status: DISCONTINUED | OUTPATIENT
Start: 2024-03-21 | End: 2024-03-21

## 2024-03-21 RX ORDER — ATORVASTATIN CALCIUM 80 MG/1
80 TABLET, FILM COATED ORAL AT BEDTIME
Refills: 0 | Status: DISCONTINUED | OUTPATIENT
Start: 2024-03-21 | End: 2024-03-24

## 2024-03-21 RX ORDER — POTASSIUM CHLORIDE 20 MEQ
10 PACKET (EA) ORAL
Refills: 0 | Status: COMPLETED | OUTPATIENT
Start: 2024-03-21 | End: 2024-03-21

## 2024-03-21 RX ORDER — ASPIRIN/CALCIUM CARB/MAGNESIUM 324 MG
325 TABLET ORAL ONCE
Refills: 0 | Status: COMPLETED | OUTPATIENT
Start: 2024-03-21 | End: 2024-03-21

## 2024-03-21 RX ORDER — POTASSIUM CHLORIDE 20 MEQ
40 PACKET (EA) ORAL ONCE
Refills: 0 | Status: COMPLETED | OUTPATIENT
Start: 2024-03-21 | End: 2024-03-21

## 2024-03-21 RX ORDER — PANTOPRAZOLE SODIUM 20 MG/1
40 TABLET, DELAYED RELEASE ORAL
Refills: 0 | Status: DISCONTINUED | OUTPATIENT
Start: 2024-03-21 | End: 2024-03-24

## 2024-03-21 RX ORDER — ACETAMINOPHEN 500 MG
650 TABLET ORAL EVERY 6 HOURS
Refills: 0 | Status: DISCONTINUED | OUTPATIENT
Start: 2024-03-21 | End: 2024-03-24

## 2024-03-21 RX ORDER — ENOXAPARIN SODIUM 100 MG/ML
40 INJECTION SUBCUTANEOUS EVERY 24 HOURS
Refills: 0 | Status: DISCONTINUED | OUTPATIENT
Start: 2024-03-21 | End: 2024-03-24

## 2024-03-21 RX ORDER — ASPIRIN/CALCIUM CARB/MAGNESIUM 324 MG
325 TABLET ORAL DAILY
Refills: 0 | Status: DISCONTINUED | OUTPATIENT
Start: 2024-03-22 | End: 2024-03-22

## 2024-03-21 RX ORDER — HYDRALAZINE HCL 50 MG
10 TABLET ORAL ONCE
Refills: 0 | Status: COMPLETED | OUTPATIENT
Start: 2024-03-21 | End: 2024-03-21

## 2024-03-21 RX ORDER — ONDANSETRON 8 MG/1
4 TABLET, FILM COATED ORAL ONCE
Refills: 0 | Status: COMPLETED | OUTPATIENT
Start: 2024-03-21 | End: 2024-03-21

## 2024-03-21 RX ORDER — HYDRALAZINE HCL 50 MG
10 TABLET ORAL EVERY 6 HOURS
Refills: 0 | Status: DISCONTINUED | OUTPATIENT
Start: 2024-03-21 | End: 2024-03-24

## 2024-03-21 RX ADMIN — Medication 100 MILLIEQUIVALENT(S): at 10:11

## 2024-03-21 RX ADMIN — Medication 10 MILLIGRAM(S): at 12:31

## 2024-03-21 RX ADMIN — Medication 40 MILLIEQUIVALENT(S): at 15:48

## 2024-03-21 RX ADMIN — PANTOPRAZOLE SODIUM 40 MILLIGRAM(S): 20 TABLET, DELAYED RELEASE ORAL at 15:46

## 2024-03-21 RX ADMIN — ATORVASTATIN CALCIUM 80 MILLIGRAM(S): 80 TABLET, FILM COATED ORAL at 22:10

## 2024-03-21 RX ADMIN — ENOXAPARIN SODIUM 40 MILLIGRAM(S): 100 INJECTION SUBCUTANEOUS at 23:32

## 2024-03-21 RX ADMIN — ONDANSETRON 4 MILLIGRAM(S): 8 TABLET, FILM COATED ORAL at 13:26

## 2024-03-21 RX ADMIN — Medication 100 MILLIEQUIVALENT(S): at 12:02

## 2024-03-21 RX ADMIN — Medication 325 MILLIGRAM(S): at 18:48

## 2024-03-21 NOTE — DIETITIAN NUTRITION RISK NOTIFICATION - TREATMENT: THE FOLLOWING DIET HAS BEEN RECOMMENDED
Diet, Regular:   Gastroesophageal Reflux Disease  Gluten-Gliadin Restricted  Supplement Feeding Modality:  Oral  Ensure Plus High Protein Cans or Servings Per Day:  1       Frequency:  Daily (03-21-24 @ 12:08) [Pending Verification By Attending]  Diet, NPO (03-20-24 @ 16:07) [Active]

## 2024-03-21 NOTE — PROGRESS NOTE ADULT - PROBLEM SELECTOR PLAN 3
K 3.3 on admission  -replete as needed K 3.3 on admission  -replete as needed k wnl. K 3.3 on admission  -replete as needed -repeat k wnl.

## 2024-03-21 NOTE — PROGRESS NOTE ADULT - SUBJECTIVE AND OBJECTIVE BOX
Patient is a 88y old  Female who presents with a chief complaint of Cerebral infarction     (21 Mar 2024 12:08)      INTERVAL HPI/OVERNIGHT EVENTS:     T(C): 36.6 (03-21-24 @ 11:49), Max: 36.8 (03-20-24 @ 18:54)  HR: 57 (03-21-24 @ 12:00) (54 - 71)  BP: 184/77 (03-21-24 @ 12:00) (130/60 - 194/84)  RR: 23 (03-21-24 @ 12:00) (8 - 36)  SpO2: 99% (03-21-24 @ 12:00) (90% - 100%)  Wt(kg): --  I&O's Summary    20 Mar 2024 07:01  -  21 Mar 2024 07:00  --------------------------------------------------------  IN: 67.5 mL / OUT: 1850 mL / NET: -1782.5 mL        REVIEW OF SYSTEMS:  CONSTITUTIONAL: No fever, weight loss, or fatigue  EYES: No eye pain, visual disturbances, or discharge  ENMT:  No difficulty hearing, tinnitus, vertigo; No sinus or throat pain  NECK: No pain or stiffness  BREASTS: No pain, no masses  RESPIRATORY: No cough, wheezing, chills or hemoptysis; No shortness of breath  CARDIOVASCULAR: No chest pain, palpitations, dizziness, or leg swelling  GASTROINTESTINAL: No abdominal or epigastric pain. No nausea, vomiting, or hematemesis; No diarrhea or constipation. No melena or hematochezia.  GENITOURINARY: No dysuria, frequency, hematuria, or incontinence  NEUROLOGICAL: No headaches, memory loss, loss of strength, numbness, or tremors  SKIN: No itching, burning, rashes, or lesions   MUSCULOSKELETAL: No joint pain or swelling; No muscle, back, or extremity pain    PHYSICAL EXAM:  GENERAL: NAD, well-groomed, well-developed  HEAD:  Atraumatic, Normocephalic  EYES: EOMI, PERRLA, conjunctiva and sclera clear  ENMT: No tonsillar erythema, exudates, or enlargement; Moist mucous membranes  NECK: Supple, No JVD  NERVOUS SYSTEM:  Alert & Oriented X3; Motor Strength 5/5 B/L upper and lower extremities; DTRs 2+ intact and symmetric  CHEST/LUNG: Clear to percussion bilaterally; No rales, rhonchi, wheezing, or rubs  HEART: Regular rate and rhythm; No murmurs, rubs, or gallops  ABDOMEN: Soft, Nontender, Nondistended; Bowel sounds present  EXTREMITIES:  2+ Peripheral Pulses, No clubbing, cyanosis, or edema  SKIN: No rashes or lesions    MEDICATIONS  (STANDING):  atorvastatin 80 milliGRAM(s) Oral at bedtime  pantoprazole    Tablet 40 milliGRAM(s) Oral before breakfast  potassium chloride  10 mEq/100 mL IVPB 10 milliEquivalent(s) IV Intermittent every 1 hour    MEDICATIONS  (PRN):  acetaminophen     Tablet .. 650 milliGRAM(s) Oral every 6 hours PRN Mild Pain (1 - 3)  hydrALAZINE Injectable 10 milliGRAM(s) IV Push every 6 hours PRN BP control      LABS:                        13.7   5.73  )-----------( 308      ( 21 Mar 2024 05:28 )             41.4     03-21    148<H>  |  108  |  8   ----------------------------<  87  3.2<L>   |  34<H>  |  0.61    Ca    9.2      21 Mar 2024 05:28  Phos  3.5     03-21  Mg     2.0     03-21    TPro  6.5  /  Alb  3.2<L>  /  TBili  0.6  /  DBili  x   /  AST  34  /  ALT  39  /  AlkPhos  67  03-21    PT/INR - ( 20 Mar 2024 16:05 )   PT: 11.1 sec;   INR: 0.95 ratio         PTT - ( 20 Mar 2024 16:05 )  PTT:28.3 sec  Urinalysis Basic - ( 21 Mar 2024 05:28 )    Color: x / Appearance: x / SG: x / pH: x  Gluc: 87 mg/dL / Ketone: x  / Bili: x / Urobili: x   Blood: x / Protein: x / Nitrite: x   Leuk Esterase: x / RBC: x / WBC x   Sq Epi: x / Non Sq Epi: x / Bacteria: x      CAPILLARY BLOOD GLUCOSE      POCT Blood Glucose.: 111 mg/dL (20 Mar 2024 15:53)              RADIOLOGY & ADDITIONAL TESTS:    Imaging Personally Reviewed:       Advance Directives:      Palliative Care:  Appropriate     Patient is a 88y old  Female who presents with a chief complaint of Cerebral infarction     (21 Mar 2024 12:08)  pt seen and examine today awake , npo , symptom improved.     INTERVAL HPI/OVERNIGHT EVENTS:     T(C): 36.6 (03-21-24 @ 11:49), Max: 36.8 (03-20-24 @ 18:54)  HR: 57 (03-21-24 @ 12:00) (54 - 71)  BP: 184/77 (03-21-24 @ 12:00) (130/60 - 194/84)  RR: 23 (03-21-24 @ 12:00) (8 - 36)  SpO2: 99% (03-21-24 @ 12:00) (90% - 100%)  Wt(kg): --  I&O's Summary    20 Mar 2024 07:01  -  21 Mar 2024 07:00  --------------------------------------------------------  IN: 67.5 mL / OUT: 1850 mL / NET: -1782.5 mL        REVIEW OF SYSTEMS:  CONSTITUTIONAL: No fever, weight loss, or fatigue  EYES: No eye pain, visual disturbances, or discharge  ENMT:  ; No sinus or throat pain  NECK: No pain or stiffness  BREASTS: No pain, no masses  RESPIRATORY: No cough, wheezing, chills   No shortness of breath  CARDIOVASCULAR: No chest pain, palpitations, dizziness, or leg swelling  GASTROINTESTINAL: No abdominal or epigastric pain. No nausea, vomiting, No diarrhea or constipation.   GENITOURINARY: No dysuria, frequency, hematuria, or incontinence  NEUROLOGICAL: No headaches, memory loss, loss of strength, numbness, or tremors  SKIN: No itching, burning, rashes, or lesions   MUSCULOSKELETAL: No joint pain or swelling; No muscle, back, or extremity pain    PHYSICAL EXAM:  GENERAL: NAD,   HEAD:  Atraumatic, Normocephalic  EYES: EOMI, PERRLA, conjunctiva and sclera clear  ENMT: Moist mucous membranes  NECK: Supple, No JVD  NERVOUS SYSTEM:  Alert & Oriented X3; Motor Strength 5/5 B/L upper and lower extremities; DTRs 2+ intact and symmetric  CHEST/LUNG:   percussion bilaterally; No rales, rhonchi, wheezing   HEART: Regular rate and rhythm; No murmurs,  no tachy  ABDOMEN: Soft, Nontender, Nondistended; Bowel sounds present  EXTREMITIES:  2+ Peripheral Pulses, No clubbing, cyanosis, or edema  SKIN: No rashes or lesions    MEDICATIONS  (STANDING):  atorvastatin 80 milliGRAM(s) Oral at bedtime  pantoprazole    Tablet 40 milliGRAM(s) Oral before breakfast  potassium chloride  10 mEq/100 mL IVPB 10 milliEquivalent(s) IV Intermittent every 1 hour    MEDICATIONS  (PRN):  acetaminophen     Tablet .. 650 milliGRAM(s) Oral every 6 hours PRN Mild Pain (1 - 3)  hydrALAZINE Injectable 10 milliGRAM(s) IV Push every 6 hours PRN BP control      LABS:                        13.7   5.73  )-----------( 308      ( 21 Mar 2024 05:28 )             41.4     03-21    148<H>  |  108  |  8   ----------------------------<  87  3.2<L>   |  34<H>  |  0.61    Ca    9.2      21 Mar 2024 05:28  Phos  3.5     03-21  Mg     2.0     03-21    TPro  6.5  /  Alb  3.2<L>  /  TBili  0.6  /  DBili  x   /  AST  34  /  ALT  39  /  AlkPhos  67  03-21    PT/INR - ( 20 Mar 2024 16:05 )   PT: 11.1 sec;   INR: 0.95 ratio         PTT - ( 20 Mar 2024 16:05 )  PTT:28.3 sec  Urinalysis Basic - ( 21 Mar 2024 05:28 )    Color: x / Appearance: x / SG: x / pH: x  Gluc: 87 mg/dL / Ketone: x  / Bili: x / Urobili: x   Blood: x / Protein: x / Nitrite: x   Leuk Esterase: x / RBC: x / WBC x   Sq Epi: x / Non Sq Epi: x / Bacteria: x          POCT Blood Glucose.: 111 mg/dL (20 Mar 2024 15:53)              RADIOLOGY & ADDITIONAL TESTS:    Imaging Personally Reviewed:   mri /mra   Advance Directives:  full code    Palliative Care:  Appropriate

## 2024-03-21 NOTE — PROGRESS NOTE ADULT - ASSESSMENT
87 y/o F with PMHx HLD, celiac disease and GERD presenting with slurred speech since around 1:30pm today. Admitted for CVA s/p TNK.

## 2024-03-21 NOTE — PROGRESS NOTE ADULT - PROBLEM SELECTOR PLAN 1
Admit to ICU s/p TNK @ 1700  - CT head: Short segment occlusion of a left proximal M2 branch   - Bedside dysphagia screen failed will keep NPO for now and request formal speech /swallow eval for tomorrow  - s/p Labetalol 10mg IV x1 in ED in order to meet BP threshold for TNK administration  - Allow permissive HTN for first 48 hours, will not treat unless SBP>180 or DBP>105  - Repeat CT 24 hrs after TNK or sooner if neurological status changes  - F/u MRI brain and TTE w/ bubble study  - F/u A1c, lipid panel, TSH, B12 , folate  - Aspiration precautions  - Neuro checks Q1H  - High dose statin once no longer NPO. Consider starting AC and high dose asa pending neuro recs   - PT/OT eval  -Teleneuro (Dr. Jimenez) consulted by the ED -- TNK recommended as per teleneuro -- no need for neuro intervention procedure  - Neuro consulted Dr. Davidson, f/u recs  - Cardio consulted [facility], f/u recs  - Plan as per ICU Admit to ICU s/p TNK @ 1700  - CT head: Short segment occlusion of a left proximal M2 branch   - Bedside dysphagia screen failed will keep NPO for now and request formal speech /swallow eval for tomorrow  - s/p Labetalol 10mg IV x1 in ED in order to meet BP threshold for TNK administration  - Allow permissive HTN for first 48 hours, will not treat unless SBP>180 or DBP>105  - Repeat CT 24 hrs after TNK or sooner if neurological status changes  - F/u MRI brain and TTE w/ bubble study  - F/u A1c 5.5, lipid panel ldl 75 , TSH, B12 , folate  - Aspiration precautions  - Neuro checks Q1H  - High dose statin once no longer NPO. Consider starting AC and high dose asa pending neuro recs   - PT/OT eval  -Teleneuro (Dr. Jimenez) consulted by the ED -- TNK recommended as per teleneuro -- no need for neuro intervention procedure  - Neuro consulted Dr. Davidson, f/u recs  - Cardio consulted [facility], f/u recs  - Plan as per ICU ICU s/p TNK @ 1700  - CT head: Short segment occlusion of a left proximal M2 branch   - Bedside dysphagia screen failed will keep NPO for now and request formal speech /swallow eval for tomorrow  - s/p Labetalol 10mg IV x1 in ED in order to meet BP threshold for TNK administration  - Allow permissive HTN for first 48 hours, will not treat unless SBP>180 or DBP>105  - Repeat CT 24 hrs after TNK or sooner if neurological status changes  - F/u MRI brain and TTE w/ bubble study  -  A1c 5.5, lipid panel ldl 75 ,   - Aspiration precautions  - Neuro checks Q1H  - High dose statin once no longer NPO. Consider starting AC and high dose asa pending neuro recs   - PT/OT eval  -Teleneuro (Dr. Jimenez) consulted by the ED -- TNK recommended as per teleneuro -- no need for neuro intervention procedure  - Neuro consulted Dr. Davidson,   - Cardio consulted [facility],   - Plan as per ICU ICU s/p TNK @ 1700  - CT head: Short segment occlusion of a left proximal M2 branch   - Bedside dysphagia screen failed will keep NPO for now and request formal speech /swallow eval for tomorrow  - s/p Labetalol 10mg IV x1 in ED in order to meet BP threshold for TNK administration  - Allow permissive HTN for first 48 hours, will not treat unless SBP>180 or DBP>105  - Repeat CT 24 hrs after TNK or sooner if neurological status changes  - F/u MRI brain and TTE w/ bubble study  -  A1c 5.5, lipid panel ldl 75 ,   - Aspiration precautions  - Neuro checks Q1H  - High dose statin once no longer NPO. Consider starting AC and high dose asa pending neuro recs   -Teleneuro (Dr. Jimenez) consulted by the ED -- TNK recommended as per teleneuro -- no need for neuro intervention procedure  - Neuro consulted Dr. Davidson,   - Cardio consulted [facility],   - PT/OT eval  - Plan as per ICU

## 2024-03-21 NOTE — DIETITIAN INITIAL EVALUATION ADULT - ADD RECOMMEND
1) Recommend regular, gluten free, GERD diet when medically feasible; assistance/encouragement at meal times as needed  2) Recommend ensure plus HP daily (350kcal, 20gm protein per 8 fl oz serving); vanilla flavor)  3) Recommend MVI daily  4) Electrolyte replacement prn  5) Monitor po intake, diet tolerance, weight trends, labs, GI function, skin integrity

## 2024-03-21 NOTE — DIETITIAN INITIAL EVALUATION ADULT - OTHER INFO
Pt is a "88 year-old F with PMHx  of supraventricular ectopy, palpitations, dyspnea, mild aortic insufficiency, mild carotid atherosclerosis, HLD, celiac disease, a hepatic cyst, a R kidney cyst, a cystocele, left thyroid nodules, osteoporosis, urinary stress incontinence (pessary) presents with slurred speech since 1330 today. As per , pt has been losing weight over the past months, GI appointment set up."    Visited pt at bedside this am. Pt reports fair appetite/intake PTA. Remains NPO at this time. S/p swallow evaluation this am; SLP recommended regular solids with thin liquids. Hx celiac disease; follows a gluten-free diet at home. Denies N/V. No BMs thus far. CBW on admission 99#. Pt reports wt loss over the past few months; reports previous wt of 114# in September. Weight went down to 98# in January; indicating 16#/14% wt loss x ~4 months. No edema noted. Skin: stage I to sacrum. Reviewed labs; hypokalemia noted, K 3.2 (3/21). Supplementation ordered. Pt remains NPO at this time. Recommend advancing diet to regular, gluten restricted, GERD diet with thin liquids when medically feasible. Pt agreeable to receive ensure plus HP daily for additional kcal/protein (vanilla flavor). Food preferences obtained to optimize po intake/tolerance. RD remains available and will continue to follow-up.

## 2024-03-21 NOTE — SWALLOW BEDSIDE ASSESSMENT ADULT - SWALLOW EVAL: DIAGNOSIS
Patient presents with functional oral stage swallow and no signs of pharyngeal dysphagia or aspiration/penetration

## 2024-03-21 NOTE — DIETITIAN INITIAL EVALUATION ADULT - PROBLEM SELECTOR PLAN 1
Admit to ICU s/p TNK @ 1700  - CT head: Short segment occlusion of a left proximal M2 branch   - Bedside dysphagia screen failed will keep NPO for now and request formal speech /swallow eval for tomorrow  - s/p Labetalol 10mg IV x1 in ED in order to meet BP threshold for TNK administration  - Allow permissive HTN for first 48 hours, will not treat unless SBP>180 or DBP>105  - Repeat CT 24 hrs after TNK or sooner if neurological status changes  - F/u MRI brain and TTE w/ bubble study  - F/u A1c, lipid panel, TSH, B12 , folate  - Aspiration precautions  - Neuro checks Q1H  - High dose statin once no longer NPO. Consider starting AC and high dose asa pending neuro recs   - PT/OT eval  -Teleneuro (Dr. Jimenez) consulted by the ED -- TNK recommended as per teleneuro -- no need for neuro intervention procedure  - Neuro consulted Dr. Davidson, f/u recs  - Cardio consulted [facility], f/u recs  - Plan as per ICU

## 2024-03-21 NOTE — CARE COORDINATION ASSESSMENT. - NSCAREPROVIDERS_GEN_ALL_CORE_FT
CARE PROVIDERS:  Accepting Physician: Lisandro Ward  Access Services: Marcela Romero  Access Services: Dvean Beavers  Administration: Donaldo Hawkins  Administration: Seun Covington  Admitting: Lisandro Ward  Attending: Lisandro Ward  Cardiology Technician: Marcela Patino  Case Management: Jose A Hampton  Consultant: Jose A Villela  Consultant: Penny Mathis  Consultant: Jose Davidson  Consultant: William Villela  Consultant: Theo Ortega  Consultant: Lynette Hussein  Covering Nurse: Mary Cadena  Covering Team: Rica Michel  ED Attending: Jackie Villela  ED Nurse: Ramila Garner  Nurse: Nina Michel  Nurse: Mey Stuart  Nurse: Suma Osei  Nurse: Lashell Sampson  Occupational Therapy: Imani Oneill  Ordered: Aaron Lee  Outpatient Provider: Jose Davidson  Override: Laura Garrison  PCA/Nursing Assistant: Jena Ahmadi  Physical Therapy: Oksana Whitney  Physical Therapy: Jann Skinner  Primary Team: Prabhjot Castro  Primary Team: Socorro Garcia  Primary Team: Lisandro Ward  Primary Team: Jena Benito  Primary Team: Marshal Colbert  Primary Team: Maxi Mueller  Primary Team: Wayne Jimenez  Quality Review: Suma Nunes  Registered Dietitian: Natalie Christensen  : Avril Chavez  Speech Pathology: Madi West  Speech Pathology: Leidy Cormier  Student: Debra Toribio  Team: [facility], Team

## 2024-03-21 NOTE — PROGRESS NOTE ADULT - SUBJECTIVE AND OBJECTIVE BOX
[facility] - Tori Vila, Manohar Damon Savella, Cohen  Office Number:  [phone]    Patient resting comfortably in bed in NAD.  Laying flat with no respiratory distress.  No complaints of chest pain, dyspnea, palpitations, PND, or orthopnea.  speech better  on ra    ROS: negative unless otherwise mentioned.    Telemetry:  sr    MEDICATIONS  (STANDING):  potassium chloride  10 mEq/100 mL IVPB 10 milliEquivalent(s) IV Intermittent every 1 hour    MEDICATIONS  (PRN):      Allergies    No Known Drug Allergies  Gluten (Flatulence; Other; Diarrhea)    Intolerances        Vital Signs Last 24 Hrs  T(C): 36.6 (21 Mar 2024 07:53), Max: 36.8 (20 Mar 2024 18:54)  T(F): 97.9 (21 Mar 2024 07:53), Max: 98.3 (20 Mar 2024 20:32)  HR: 63 (21 Mar 2024 11:00) (54 - 71)  BP: 177/79 (21 Mar 2024 11:00) (130/60 - 194/84)  BP(mean): 114 (21 Mar 2024 11:00) (86 - 142)  RR: 14 (21 Mar 2024 11:00) (8 - 36)  SpO2: 100% (21 Mar 2024 11:00) (90% - 100%)    Parameters below as of 21 Mar 2024 08:00  Patient On (Oxygen Delivery Method): room air        I&O's Summary    20 Mar 2024 07:01  -  21 Mar 2024 07:00  --------------------------------------------------------  IN: 67.5 mL / OUT: 1850 mL / NET: -1782.5 mL        ON EXAM:    Constitutional: NAD, awake and alert  HEENT: Moist Mucous Membranes, Anicteric  Pulmonary: Non-labored, breath sounds are clear bilaterally, No wheezing, rales or rhonchi  Cardiovascular: Regular, S1 and S2, No murmurs, No rubs, gallops or clicks  Gastrointestinal: Bowel Sounds present, soft, nontender.   Lymph: No peripheral edema. No lymphadenopathy.  Skin: No visible rashes or ulcers.  Psych:  Mood & affect appropriate    LABS: All Labs Reviewed:                        13.7   5.73  )-----------( 308      ( 21 Mar 2024 05:28 )             41.4                         14.2   6.64  )-----------( 323      ( 20 Mar 2024 16:05 )             43.5     21 Mar 2024 05:28    148    |  108    |  8      ----------------------------<  87     3.2     |  34     |  0.61   20 Mar 2024 16:05    144    |  106    |  12     ----------------------------<  117    3.3     |  32     |  0.76     Ca    9.2        21 Mar 2024 05:28  Ca    9.4        20 Mar 2024 16:05  Phos  3.5       21 Mar 2024 05:28  Mg     2.0       21 Mar 2024 05:28    TPro  6.5    /  Alb  3.2    /  TBili  0.6    /  DBili  x      /  AST  34     /  ALT  39     /  AlkPhos  67     21 Mar 2024 05:28  TPro  7.0    /  Alb  3.6    /  TBili  0.5    /  DBili  x      /  AST  31     /  ALT  30     /  AlkPhos  72     20 Mar 2024 16:05    PT/INR - ( 20 Mar 2024 16:05 )   PT: 11.1 sec;   INR: 0.95 ratio         PTT - ( 20 Mar 2024 16:05 )  PTT:28.3 sec      Blood Culture:

## 2024-03-21 NOTE — PROGRESS NOTE ADULT - ASSESSMENT
CHARTING IN PROGRESS Assessment  87 yo F with PMHx HLD, Celiac, GERD, Supraventricular Ectopy presents to \[facility]\"" ED with slurred speech and R arm weakness, admitted for CVA s/p TNK.    Plan     #Neuro: Acute CVA  - CT Head Non Con initially negative; CT Brain Stroke protocol showed core infarct and ischemic penumbra in L MCA; results c/w M2 segment ischemia  - S/p TNK yesterday at 17:00  - Patient passed S/S eval - will order PO diet  - High dose statin initiated for tonight  - A1C wnl; Lipid panel wnl; TTE pending  - Repeat CT Head and MRI 24h post-TNK  - F/u Neuro Dr. Dempsey recs re: anticoagulation after 24h post-TNK window passed    #CV  - ?Hx CAD on chart review  - Hx Supraventricular ectopy on outpatient EP workup on chart review, pending placement of holter monitor  - Pt with SBP > 200 during exam  - Permissive HTN, home meds held, however goal SBP < 180 now s/p TNK  - PRN Hydralazine for SBP > 180  - Will consider introducing home antihypertensives tomorrow  - ECHO 12/2023 with normal LV & RV size and function EF 57%, Trace MR, mild AR, PASP   - F/u TTE    #PULMONARY   - No active issues, satting well on RA    #INFECTIOUS DISEASE   - No active issues    #GI  - Resume home Pantoprazole for hx GERD    #RENAL and acid base   - No active issues  - Monitor and replete lytes PRN; goal K > 4, Mg > 2, Phos > 3    #Endocrine   - No active issues    #Hematology   - No active issues, Hgb stable  - F/u Neuro recs re: AC after 24h post-TNK window    #Code Status: Full code

## 2024-03-21 NOTE — PROGRESS NOTE ADULT - ASSESSMENT
88 year-old F with PMH  of supraventricular ectopy, palpitations, dyspnea, mild aortic insufficiency, mild carotid atherosclerosis, HLD, celiac disease, a hepatic cyst, a R kidney cyst, a cystocele, left thyroid nodules, osteoporosis, urinary stress incontinence (pessary) presents with slurred speech, now s/p TNK    Stroke, HLD   - CTA Brain showed core infarct and ischemic penumbra in the left MCA territory: CBF<30% volume: 3 ml. Left MCA territory, suggesting core infarct. Tmax>6.0s volume: 33 ml. Mismatch volume: 30 ml, left MCA territory, suggesting ischemic penumbra. Mismatch ratio: 11.   - CT angiography neck: No hemodynamically significant stenosis of the bilateral cervical ICAs using NASCET criteria.  Patent vertebral arteries.  No evidence of vascular dissection.   - CT angiography brain: Short segment occlusion of a left proximal M2 branch (304:304, 607:53, 604:159) with distal reconstitution. No evidence of aneurysm.   - S/P TNK in ER.   - Monitor in ICU  - Neurology following  - Monitor in tele for occult AF   - Please obtain transthoracic echocardiogram to assess ventricular function, wall motion and valvular abnormalities (EF normal in late 2023)    - -190s  - S/p labetalol 10 mg IV x 1   - Permissive HTN per neurology and then gradual normotension.  - Give hydral 10mg IV if SBP>180    - EKG showed SR @ 64, LVH  - mild troponin leak noted, though no suggestion of acs  - Continue to trend, likely elevated in the setting of CVA    - ECHO 12/2023 with normal LV & RV size and function EF 57%, Trace MR, mild AR, PASP 27  - No evidence of any meaningful volume overload     - Monitor and replete lytes, keep K>4, Mg>2.  - Will continue to follow.

## 2024-03-21 NOTE — SWALLOW BEDSIDE ASSESSMENT ADULT - COMMENTS
per charting - Patient is a 88y old  Female who presents with a chief complaint of CVA (21 Mar 2024 09:12)    24 hour events: admitted to ICU overnight CVA, s/p TNK @ 1700 in the ED, s/p Labetalol 10mg IVP x1. Patient's presenting symptoms yesterday included lightheadedness, confusion, inability to "understand what was going on, what was being said to [her], and inability to get words out" per patient. On chart review,  endorsed staring off into space in the home.    Patient seen and examined at bedside in ICU. Reports she feels significantly improved from previous, confusion and lightheadedness have resolved. Endorses ability to understand verbal communication is completely resolved and at baseline,   CT HEAD - Core infarct and ischemic penumbra in the left MCA territory:  CXR Impression: The heart is unremarkable. The lungs are clear    Pt received in bed, [id number], RN reporting patient much improved compared to yesterday. Confirmed that MD wants swallow eval as opposed to speech/language eval. Pt reports speech is back to baseline (she states intermittent difficulty remembering names prior to this event). She is AAOX3, following commands, intelligible in structured conversation and does not demonstrate word finding difficulty during this assessment. She reports no previous difficulty swallowing but does have GERD.

## 2024-03-21 NOTE — SWALLOW BEDSIDE ASSESSMENT ADULT - SWALLOW EVAL: THERAPY FREQUENCY
SLP to f/u 1x to determine diet tolerance/further appropriate management if any given CVA dx, will d/c at that time if no issues noted/as schedule permits

## 2024-03-21 NOTE — DIETITIAN INITIAL EVALUATION ADULT - PERTINENT LABORATORY DATA
03-21    148<H>  |  108  |  8   ----------------------------<  87  3.2<L>   |  34<H>  |  0.61    Ca    9.2      21 Mar 2024 05:28  Phos  3.5     03-21  Mg     2.0     03-21    TPro  6.5  /  Alb  3.2<L>  /  TBili  0.6  /  DBili  x   /  AST  34  /  ALT  39  /  AlkPhos  67  03-21  POCT Blood Glucose.: 111 mg/dL (03-20-24 @ 15:53)  A1C with Estimated Average Glucose Result: 5.5 % (03-21-24 @ 05:28)  A1C with Estimated Average Glucose Result: 5.4 % (12-26-23 @ 06:53)

## 2024-03-21 NOTE — CARE COORDINATION ASSESSMENT. - REASON FOR CONSULT
SW met with patient at bedside in order to conduct assessment and to discuss SW roles with good understanding./coordination of care/discharge planning

## 2024-03-21 NOTE — PROGRESS NOTE ADULT - SUBJECTIVE AND OBJECTIVE BOX
Patient is a 88y old  Female who presents with a chief complaint of CVA (21 Mar 2024 09:12)    24 hour events: admitted to ICU overnight CVA, s/p TNK @ 1700 in the ED, s/p Labetalol 10mg IVP x1. Patient's presenting symptoms yesterday included dizziness, confusion, inability to "understand what was going on, what was being said to [her], and inability to get words out" per patient. On chart review,  endorsed staring off into space in the home.     REVIEW OF SYSTEMS  Constitutional: No fever, chills, fatigue  Neuro: No headache, numbness, weakness  Resp: No cough, wheezing, shortness of breath  CVS: No chest pain, palpitations, leg swelling  GI: No abdominal pain, nausea, vomiting, diarrhea   : No dysuria, frequency, incontinence  Skin: No itching, burning, rashes, or lesions   Msk: No joint pain or swelling  Psych: No depression, anxiety, mood swings  Heme: No bleeding    T(F): 97.9 (03-21-24 @ 07:53), Max: 98.3 (03-20-24 @ 20:32)  HR: 54 (03-21-24 @ 10:00) (54 - 71)  BP: 164/68 (03-21-24 @ 10:00) (130/60 - 194/84)  RR: 14 (03-21-24 @ 10:00) (8 - 36)  SpO2: 97% (03-21-24 @ 10:00) (90% - 100%)  Wt(kg): --          I&O's Detail    20 Mar 2024 07:01  -  21 Mar 2024 07:00  --------------------------------------------------------  IN:    IV PiggyBack: 67.5 mL  Total IN: 67.5 mL    OUT:    Voided (mL): 1850 mL  Total OUT: 1850 mL    Total NET: -1782.5 mL          PHYSICAL EXAM  General:   CNS:   HEENT:   Resp:   CVS:   Abd:   Ext:   Skin:     MEDICATIONS                    potassium chloride  10 mEq/100 mL IVPB IV Intermittent                                13.7   5.73  )-----------( 308      ( 21 Mar 2024 05:28 )             41.4       03-21    148<H>  |  108  |  8   ----------------------------<  87  3.2<L>   |  34<H>  |  0.61    Ca    9.2      21 Mar 2024 05:28  Phos  3.5     03-21  Mg     2.0     03-21    TPro  6.5  /  Alb  3.2<L>  /  TBili  0.6  /  DBili  x   /  AST  34  /  ALT  39  /  AlkPhos  67  03-21          PT/INR - ( 20 Mar 2024 16:05 )   PT: 11.1 sec;   INR: 0.95 ratio         PTT - ( 20 Mar 2024 16:05 )  PTT:28.3 sec  Urinalysis Basic - ( 21 Mar 2024 05:28 )    Color: x / Appearance: x / SG: x / pH: x  Gluc: 87 mg/dL / Ketone: x  / Bili: x / Urobili: x   Blood: x / Protein: x / Nitrite: x   Leuk Esterase: x / RBC: x / WBC x   Sq Epi: x / Non Sq Epi: x / Bacteria: x            Radiology: ***  Bedside lung ultrasound: ***  Bedside ECHO: ***    CENTRAL LINE: Y/N          DATE INSERTED:              REMOVE: Y/N  VELAZQUEZ: Y/N                        DATE INSERTED:              REMOVE: Y/N  A-LINE: Y/N                       DATE INSERTED:              REMOVE: Y/N    GLOBAL ISSUE/BEST PRACTICE  Analgesia:   Sedation:   CAM-ICU:   HOB elevation: yes  Stress ulcer prophylaxis:   VTE prophylaxis:   Glycemic control:   Nutrition:     CODE STATUS: ***  [facility] discussion: Y       Patient is a 88y old  Female who presents with a chief complaint of CVA (21 Mar 2024 09:12)    24 hour events: admitted to ICU overnight CVA, s/p TNK @ 1700 in the ED, s/p Labetalol 10mg IVP x1. Patient's presenting symptoms yesterday included lightheadedness, confusion, inability to "understand what was going on, what was being said to [her], and inability to get words out" per patient. On chart review,  endorsed staring off into space in the home.    Patient seen and examined at bedside in ICU. Reports she feels significantly improved from previous, confusion and lightheadedness have resolved. Endorses ability to understand verbal communication is completely resolved and at baseline, however states she has some mild word-finding difficulty presently. Denies numbness, denies any focal weakness. Denies fevers/chills, HA, dizziness, LH, cp, palp, cough, sob, n/v/d/c, dysuria.    REVIEW OF SYSTEMS  Constitutional: No fever, chills, fatigue  Neuro: No headache, numbness, weakness  Resp: No cough, wheezing, shortness of breath  CVS: No chest pain, palpitations, leg swelling  GI: No abdominal pain, nausea, vomiting, diarrhea   : No dysuria, frequency, incontinence  Skin: No itching, burning, rashes, or lesions   Msk: No joint pain or swelling    T(F): 97.9 (03-21-24 @ 07:53), Max: 98.3 (03-20-24 @ 20:32)  HR: 54 (03-21-24 @ 10:00) (54 - 71)  BP: 164/68 (03-21-24 @ 10:00) (130/60 - 194/84)  RR: 14 (03-21-24 @ 10:00) (8 - 36)  SpO2: 97% (03-21-24 @ 10:00) (90% - 100%)  Wt(kg): --          I&O's Detail    20 Mar 2024 07:01  -  21 Mar 2024 07:00  --------------------------------------------------------  IN:    IV PiggyBack: 67.5 mL  Total IN: 67.5 mL    OUT:    Voided (mL): 1850 mL  Total OUT: 1850 mL    Total NET: -1782.5 mL          PHYSICAL EXAM  General: well-appearing, NAD  CNS: AAO x3. No facial droop, no ptosis, no dysarthria. Slight delay in initiation of speech. No focally diminished sensation to fine touch on face, B/L upper/lower extremities. Motor strength ***  HEENT: NCAT, EOMI, PERRL, sclerae/conjunctivae clear  Resp: CTA BL  CVS: S1S2, RRR, no murmurs appreciated  Abd: NDNT, BS+ x4 quadrants  Ext: No calf tenderness BL. No C/C/E x4 extremities. DP Pulses 1+ BL.    MEDICATIONS                    potassium chloride  10 mEq/100 mL IVPB IV Intermittent                                13.7   5.73  )-----------( 308      ( 21 Mar 2024 05:28 )             41.4       03-21    148<H>  |  108  |  8   ----------------------------<  87  3.2<L>   |  34<H>  |  0.61    Ca    9.2      21 Mar 2024 05:28  Phos  3.5     03-21  Mg     2.0     03-21    TPro  6.5  /  Alb  3.2<L>  /  TBili  0.6  /  DBili  x   /  AST  34  /  ALT  39  /  AlkPhos  67  03-21          PT/INR - ( 20 Mar 2024 16:05 )   PT: 11.1 sec;   INR: 0.95 ratio         PTT - ( 20 Mar 2024 16:05 )  PTT:28.3 sec  Urinalysis Basic - ( 21 Mar 2024 05:28 )    Color: x / Appearance: x / SG: x / pH: x  Gluc: 87 mg/dL / Ketone: x  / Bili: x / Urobili: x   Blood: x / Protein: x / Nitrite: x   Leuk Esterase: x / RBC: x / WBC x   Sq Epi: x / Non Sq Epi: x / Bacteria: x            Radiology: ***  Bedside lung ultrasound: ***  Bedside ECHO: ***    CENTRAL LINE: Y/N          DATE INSERTED:              REMOVE: Y/N  VELAZQUEZ: Y/N                        DATE INSERTED:              REMOVE: Y/N  A-LINE: Y/N                       DATE INSERTED:              REMOVE: Y/N    GLOBAL ISSUE/BEST PRACTICE  Analgesia:   Sedation:   CAM-ICU:   HOB elevation: yes  Stress ulcer prophylaxis:   VTE prophylaxis:   Glycemic control:   Nutrition:     CODE STATUS: ***  [facility] discussion: Y       Patient is a 88y old  Female who presents with a chief complaint of CVA (21 Mar 2024 09:12)    24 hour events: admitted to ICU overnight CVA, s/p TNK @ 1700 in the ED, s/p Labetalol 10mg IVP x1. Patient's presenting symptoms yesterday included lightheadedness, confusion, inability to "understand what was going on, what was being said to [her], and inability to get words out" per patient. On chart review,  endorsed staring off into space in the home, as well as R arm weakness.     Patient seen and examined at bedside in ICU. Reports she feels significantly improved from previous, confusion and lightheadedness have resolved. Endorses ability to understand verbal communication is completely resolved and at baseline, however states she has some mild word-finding difficulty presently. Per  at bedside, speech is per baseline. Denies numbness, denies any focal weakness. Denies fevers/chills, HA, dizziness, LH, cp, palp, cough, sob, n/v/d/c, dysuria.    REVIEW OF SYSTEMS  Constitutional: No fever, chills, fatigue  Neuro: No headache, numbness, weakness  Resp: No cough, wheezing, shortness of breath  CVS: No chest pain, palpitations, leg swelling  GI: No abdominal pain, nausea, vomiting, diarrhea   : No dysuria, frequency, incontinence  Skin: No itching, burning, rashes, or lesions   Msk: No joint pain or swelling    T(F): 97.9 (03-21-24 @ 07:53), Max: 98.3 (03-20-24 @ 20:32)  HR: 54 (03-21-24 @ 10:00) (54 - 71)  BP: 164/68 (03-21-24 @ 10:00) (130/60 - 194/84)  RR: 14 (03-21-24 @ 10:00) (8 - 36)  SpO2: 97% (03-21-24 @ 10:00) (90% - 100%)  Wt(kg): --          I&O's Detail    20 Mar 2024 07:01  -  21 Mar 2024 07:00  --------------------------------------------------------  IN:    IV PiggyBack: 67.5 mL  Total IN: 67.5 mL    OUT:    Voided (mL): 1850 mL  Total OUT: 1850 mL    Total NET: -1782.5 mL          PHYSICAL EXAM  General: well-appearing, NAD  CNS: AAO x3. No facial droop, no ptosis, no dysarthria. Slight delay in initiation of speech. No focally diminished sensation to fine touch on face, B/L upper/lower extremities. Motor strength 5/5 through B/L Arm ABD, Elbow Flx/Ext, Wrist Flx/Ext, Finger Flx/ABD, HF, KE, DF, PF, Hallux Ext.  HEENT: NCAT, EOMI, PERRL, sclerae/conjunctivae clear  Resp: CTA BL  CVS: S1S2, RRR, no murmurs appreciated  Abd: NDNT, BS+ x4 quadrants  Ext: No calf tenderness BL. No C/C/E x4 extremities. DP Pulses 1+ BL.    MEDICATIONS                    potassium chloride  10 mEq/100 mL IVPB IV Intermittent                                13.7   5.73  )-----------( 308      ( 21 Mar 2024 05:28 )             41.4       03-21    148<H>  |  108  |  8   ----------------------------<  87  3.2<L>   |  34<H>  |  0.61    Ca    9.2      21 Mar 2024 05:28  Phos  3.5     03-21  Mg     2.0     03-21    TPro  6.5  /  Alb  3.2<L>  /  TBili  0.6  /  DBili  x   /  AST  34  /  ALT  39  /  AlkPhos  67  03-21          PT/INR - ( 20 Mar 2024 16:05 )   PT: 11.1 sec;   INR: 0.95 ratio         PTT - ( 20 Mar 2024 16:05 )  PTT:28.3 sec  Urinalysis Basic - ( 21 Mar 2024 05:28 )    Color: x / Appearance: x / SG: x / pH: x  Gluc: 87 mg/dL / Ketone: x  / Bili: x / Urobili: x   Blood: x / Protein: x / Nitrite: x   Leuk Esterase: x / RBC: x / WBC x   Sq Epi: x / Non Sq Epi: x / Bacteria: x            GLOBAL ISSUE/BEST PRACTICE  Analgesia: N  Sedation: N  CAM-ICU: Delirium absent  HOB elevation: yes  Stress ulcer prophylaxis: N  VTE prophylaxis: N (S/p TNK)  Glycemic control: N  Nutrition: NPO pending speech/swallow eval    CODE STATUS: full Patient is a 88y old  Female who presents with a chief complaint of CVA (21 Mar 2024 09:12)    24 hour events: admitted to ICU overnight CVA, s/p TNK @ 1700 in the ED, s/p Labetalol 10mg IVP x1. Patient's presenting symptoms yesterday included lightheadedness, confusion, inability to "understand what was going on, what was being said to [her], and inability to get words out" per patient. On chart review,  endorsed staring off into space in the home, as well as R arm weakness.     Patient seen and examined at bedside in ICU. Reports she feels significantly improved from previous, confusion and lightheadedness have resolved. Endorses ability to understand verbal communication is completely resolved and at baseline, however states she has some mild word-finding difficulty presently. Per  at bedside, speech is per baseline. Denies numbness, denies any focal weakness. Denies fevers/chills, HA, dizziness, LH, cp, palp, cough, sob, n/v/d/c, dysuria.        T(F): 97.9 (03-21-24 @ 07:53), Max: 98.3 (03-20-24 @ 20:32)  HR: 54 (03-21-24 @ 10:00) (54 - 71)  BP: 164/68 (03-21-24 @ 10:00) (130/60 - 194/84)  RR: 14 (03-21-24 @ 10:00) (8 - 36)  SpO2: 97% (03-21-24 @ 10:00) (90% - 100%)  Wt(kg): --          I&O's Detail    20 Mar 2024 07:01  -  21 Mar 2024 07:00  --------------------------------------------------------  IN:    IV PiggyBack: 67.5 mL  Total IN: 67.5 mL    OUT:    Voided (mL): 1850 mL  Total OUT: 1850 mL    Total NET: -1782.5 mL          PHYSICAL EXAM  General: well-appearing, NAD  CNS: AAO x3. No facial droop, no ptosis, no dysarthria. Slight delay in initiation of speech. No focally diminished sensation to fine touch on face, B/L upper/lower extremities. Motor strength 5/5 through B/L Arm ABD, Elbow Flx/Ext, Wrist Flx/Ext, Finger Flx/ABD, HF, KE, DF, PF, Hallux Ext.  HEENT: NCAT, EOMI, PERRL, sclerae/conjunctivae clear  Resp: CTA BL  CVS: S1S2, RRR, no murmurs appreciated  Abd: NDNT, BS+ x4 quadrants  Ext: No calf tenderness BL. No C/C/E x4 extremities. DP Pulses 1+ BL.    MEDICATIONS                    potassium chloride  10 mEq/100 mL IVPB IV Intermittent                                13.7   5.73  )-----------( 308      ( 21 Mar 2024 05:28 )             41.4       03-21    148<H>  |  108  |  8   ----------------------------<  87  3.2<L>   |  34<H>  |  0.61    Ca    9.2      21 Mar 2024 05:28  Phos  3.5     03-21  Mg     2.0     03-21    TPro  6.5  /  Alb  3.2<L>  /  TBili  0.6  /  DBili  x   /  AST  34  /  ALT  39  /  AlkPhos  67  03-21          PT/INR - ( 20 Mar 2024 16:05 )   PT: 11.1 sec;   INR: 0.95 ratio         PTT - ( 20 Mar 2024 16:05 )  PTT:28.3 sec  Urinalysis Basic - ( 21 Mar 2024 05:28 )    Color: x / Appearance: x / SG: x / pH: x  Gluc: 87 mg/dL / Ketone: x  / Bili: x / Urobili: x   Blood: x / Protein: x / Nitrite: x   Leuk Esterase: x / RBC: x / WBC x   Sq Epi: x / Non Sq Epi: x / Bacteria: x      < from: MR Angio Head No Cont (03.21.24 @ 18:12) >  INTERPRETATION:  Clinical Indication: CEREBRAL INFARCTION, UNSPECIFIED  Comparison: 3/20/2024    Technique: Multiplanar MR imaging of the brain was obtained without   contrast. Time of flight MRA of the [place] was also obtained.    Findings:    Brain MRI:    There are small areas of acute diffusion in the left MCA territory   consistent with an acute infarct. There is no evidence of acute   intracranial hemorrhage. Ventricles and sulci are normal in size and   configuration for the patient's stated age. No midline shift or other   significant mass effect isnoted. There are patchy and confluent foci of   hyperintense T2 signal within the subcortical and periventricular white   matter which are nonspecific but likely related to chronic microvascular   ischemic disease.    The paranasal sinuses demonstrate no signal abnormality. The mastoids   demonstrate no signal abnormality. Bilateral orbits are within normal   limits.    Brain MRA:    Short segment occlusion of a left proximal M2 branch with distal   reconstitution. Asymmetrically diminished distal left MCA branches. There   is flow-related signal in the bilateral intradural internal carotid,   anterior cerebral and right middle cerebral arteries.    There is flow-related signal in the bilateral posterior cerebral,   basilar, and intradural vertebral arteries.    No evidence of aneurysm. Tiny aneurysms can be beyond the resolution of   MRA technique. No evidence of arteriovenous malformation.      IMPRESSION:    ACUTE LEFT MCA TERRITORY INFARCT. THERE IS NO EVIDENCE OF ACUTE   INTRACRANIALHEMORRHAGE.    SHORT SEGMENT OCCLUSION OF A LEFT PROXIMAL M2 BRANCH WITH DISTAL   RECONSTITUTION. ASYMMETRICALLY DIMINISHED DISTAL LEFT MCA BRANCHES.    --- End of Report ---    < end of copied text >  < from: CT Head No Cont (03.21.24 @ 17:25) >  FINDINGS:  There is no CT evidenceof acute intracranial hemorrhage, mass effect or   midline shift.  Gray matter-white matter differentiation is grossly   preserved.    Slight prominence of ventricles and sulci is compatible with   age-appropriate mild involutional changes.  Mild patchy periventricular   white matter hypoattenuation is compatible with chronic microvascular   ischemic disease.      The visualized paranasal sinuses and the mastoids are grossly clear.    The patient is status post intraocular lens placement bilaterally.    The calvarium and skull base appear within normal limits.    IMPRESSION:  No CT evidence of acute intracranial pathology.    --- End of Report ---      < end of copied text >  < from: Xray Chest 1 View AP/PA (03.20.24 @ 18:27) >  FINDINGS:  CATHETERS AND TUBES: None    PULMONARY: The airway is midline.  There are no airspace consolidations or radiographic evidence of   pulmonary nodules..  No pleural effusion or pneumothorax.    HEART/VASCULAR: The heart size and mediastinum configuration are within   the limits of normal.    BONES: The visualized osseous thorax is intact.    IMPRESSION:    No radiographic evidence of active chest disease..    --- End of Report ---    < end of copied text >        GLOBAL ISSUE/BEST PRACTICE  Analgesia: N  Sedation: N  CAM-ICU: Delirium absent  HOB elevation: yes  Stress ulcer prophylaxis: N  VTE prophylaxis: N (S/p TNK)  Glycemic control: N  Nutrition: NPO pending speech/swallow eval    CODE STATUS: full

## 2024-03-21 NOTE — DIETITIAN INITIAL EVALUATION ADULT - PERTINENT MEDS FT
MEDICATIONS  (STANDING):  potassium chloride  10 mEq/100 mL IVPB 10 milliEquivalent(s) IV Intermittent every 1 hour    MEDICATIONS  (PRN):

## 2024-03-21 NOTE — DIETITIAN INITIAL EVALUATION ADULT - ORAL INTAKE PTA/DIET HISTORY
Pt lives at home with . Reports fair-good appetite/intake PTA. Pt does own meal preparation and grocery shopping. Typically consumes 3 meals/day and few small snacks throughout the day. Follows a gluten-free, GERD diet at home. Regular solid foods at home.

## 2024-03-21 NOTE — PHARMACOTHERAPY INTERVENTION NOTE - COMMENTS
Patient presenting with symptoms of stroke and is found to be a candidate to receive Tenecteplase. Patient's blood pressure per chart >185/110. Assisted ED team to dose and prepare labetalol 10 mg x 1. Patient's blood subsequently within appropriate range to administer fibrinolytic therapy (<185/110).      Worked with ED team to dose and prepare Tenecteplase. Bolus of 2.4 mL (5mg/mL; 12 mg dose) per charted weight of 47.2 kg was administered by Dr. SAGE Villela at 17:00. 
Patient is an 87 yo F presenting as a code stroke. Medication history completed with patient at bedside,  at bedside, and [facility]. Per pharmacy and  at bedside, the patient does not take any OTC aspirin/blood-thinners/anticoagulants. Medication reconciliation completed with best information available. Findings relayed to Dr. Villela.
Patient is a 89 yo F admitted to the ICU. Discussed with Dr. Benito that patient has a PMHx of GERD and is on pantoprazole 40 mg daily at home. Recommended starting PPI inpatient. Recommendation accepted and order for pantoprazole 40 mg tablet daily entered.

## 2024-03-21 NOTE — CONSULT NOTE ADULT - SUBJECTIVE AND OBJECTIVE BOX
S/P TNK candidate.      The patient will be administered ICU setting.  We will place the patient on Neuro checks every 15 minutes for the first two hours followed by every 30 minutes for the next six hours, and for every hour for a total of 24 hours.  I will recommend to keep a systolic blood pressure less than 185 and a diastolic blood pressure less than 110  before TNK given. The blood pressure should be stabilized and maintained at or below after TNK administration maintain BP < 180/105; with labetalol and/ or cardene PRN  I will recommend no anticoagulation or antiplatelet therapy for the next 24 hours.    No NG tube placements for the next 24 hours.  We will plan for an MRI and MRA of the brain.  Echocardiogram.  If any changes in clinical examination, I will recommend repeat CT imaging of the brain stat.  Greater than 100 minutes of critical care time was spent with the patient, plan of care, speaking to physicians, and tPA.  After TNK administration maintain BP < 180/105; with labetalol and/ or cardene PRN  called spouse esme went to [facility] [phone]

## 2024-03-22 ENCOUNTER — TRANSCRIPTION ENCOUNTER (OUTPATIENT)
Age: 89
End: 2024-03-22

## 2024-03-22 LAB
ALBUMIN SERPL ELPH-MCNC: 3.2 G/DL — LOW (ref 3.3–5)
ALP SERPL-CCNC: 67 U/L — SIGNIFICANT CHANGE UP (ref 40–120)
ALT FLD-CCNC: 31 U/L — SIGNIFICANT CHANGE UP (ref 12–78)
ANION GAP SERPL CALC-SCNC: 10 MMOL/L — SIGNIFICANT CHANGE UP (ref 5–17)
AST SERPL-CCNC: 31 U/L — SIGNIFICANT CHANGE UP (ref 15–37)
BILIRUB SERPL-MCNC: 0.6 MG/DL — SIGNIFICANT CHANGE UP (ref 0.2–1.2)
BUN SERPL-MCNC: 13 MG/DL — SIGNIFICANT CHANGE UP (ref 7–23)
CALCIUM SERPL-MCNC: 9.3 MG/DL — SIGNIFICANT CHANGE UP (ref 8.5–10.1)
CHLORIDE SERPL-SCNC: 107 MMOL/L — SIGNIFICANT CHANGE UP (ref 96–108)
CO2 SERPL-SCNC: 26 MMOL/L — SIGNIFICANT CHANGE UP (ref 22–31)
CREAT SERPL-MCNC: 0.93 MG/DL — SIGNIFICANT CHANGE UP (ref 0.5–1.3)
EGFR: 59 ML/MIN/1.73M2 — LOW
GLUCOSE SERPL-MCNC: 148 MG/DL — HIGH (ref 70–99)
HCT VFR BLD CALC: 43.2 % — SIGNIFICANT CHANGE UP (ref 34.5–45)
HGB BLD-MCNC: 14.5 G/DL — SIGNIFICANT CHANGE UP (ref 11.5–15.5)
MAGNESIUM SERPL-MCNC: 2.1 MG/DL — SIGNIFICANT CHANGE UP (ref 1.6–2.6)
MCHC RBC-ENTMCNC: 31.4 PG — SIGNIFICANT CHANGE UP (ref 27–34)
MCHC RBC-ENTMCNC: 33.6 GM/DL — SIGNIFICANT CHANGE UP (ref 32–36)
MCV RBC AUTO: 93.5 FL — SIGNIFICANT CHANGE UP (ref 80–100)
NRBC # BLD: 0 /100 WBCS — SIGNIFICANT CHANGE UP (ref 0–0)
PHOSPHATE SERPL-MCNC: 3.1 MG/DL — SIGNIFICANT CHANGE UP (ref 2.5–4.5)
PLATELET # BLD AUTO: 294 K/UL — SIGNIFICANT CHANGE UP (ref 150–400)
POTASSIUM SERPL-MCNC: 3.9 MMOL/L — SIGNIFICANT CHANGE UP (ref 3.5–5.3)
POTASSIUM SERPL-SCNC: 3.9 MMOL/L — SIGNIFICANT CHANGE UP (ref 3.5–5.3)
PROT SERPL-MCNC: 6.2 G/DL — SIGNIFICANT CHANGE UP (ref 6–8.3)
RBC # BLD: 4.62 M/UL — SIGNIFICANT CHANGE UP (ref 3.8–5.2)
RBC # FLD: 13.6 % — SIGNIFICANT CHANGE UP (ref 10.3–14.5)
SODIUM SERPL-SCNC: 143 MMOL/L — SIGNIFICANT CHANGE UP (ref 135–145)
WBC # BLD: 7.08 K/UL — SIGNIFICANT CHANGE UP (ref 3.8–10.5)
WBC # FLD AUTO: 7.08 K/UL — SIGNIFICANT CHANGE UP (ref 3.8–10.5)

## 2024-03-22 PROCEDURE — 99233 SBSQ HOSP IP/OBS HIGH 50: CPT | Mod: GC

## 2024-03-22 PROCEDURE — 99233 SBSQ HOSP IP/OBS HIGH 50: CPT

## 2024-03-22 RX ORDER — ONDANSETRON 8 MG/1
4 TABLET, FILM COATED ORAL ONCE
Refills: 0 | Status: COMPLETED | OUTPATIENT
Start: 2024-03-22 | End: 2024-03-22

## 2024-03-22 RX ORDER — CLOPIDOGREL BISULFATE 75 MG/1
75 TABLET, FILM COATED ORAL DAILY
Refills: 0 | Status: DISCONTINUED | OUTPATIENT
Start: 2024-03-22 | End: 2024-03-24

## 2024-03-22 RX ORDER — CHLORHEXIDINE GLUCONATE 213 G/1000ML
1 SOLUTION TOPICAL
Refills: 0 | Status: DISCONTINUED | OUTPATIENT
Start: 2024-03-22 | End: 2024-03-24

## 2024-03-22 RX ORDER — ASPIRIN/CALCIUM CARB/MAGNESIUM 324 MG
81 TABLET ORAL DAILY
Refills: 0 | Status: DISCONTINUED | OUTPATIENT
Start: 2024-03-22 | End: 2024-03-24

## 2024-03-22 RX ADMIN — CLOPIDOGREL BISULFATE 75 MILLIGRAM(S): 75 TABLET, FILM COATED ORAL at 12:47

## 2024-03-22 RX ADMIN — Medication 650 MILLIGRAM(S): at 08:18

## 2024-03-22 RX ADMIN — Medication 81 MILLIGRAM(S): at 12:47

## 2024-03-22 RX ADMIN — CHLORHEXIDINE GLUCONATE 1 APPLICATION(S): 213 SOLUTION TOPICAL at 17:02

## 2024-03-22 RX ADMIN — Medication 650 MILLIGRAM(S): at 09:15

## 2024-03-22 RX ADMIN — ATORVASTATIN CALCIUM 80 MILLIGRAM(S): 80 TABLET, FILM COATED ORAL at 22:40

## 2024-03-22 RX ADMIN — ENOXAPARIN SODIUM 40 MILLIGRAM(S): 100 INJECTION SUBCUTANEOUS at 22:40

## 2024-03-22 RX ADMIN — ONDANSETRON 4 MILLIGRAM(S): 8 TABLET, FILM COATED ORAL at 04:43

## 2024-03-22 RX ADMIN — PANTOPRAZOLE SODIUM 40 MILLIGRAM(S): 20 TABLET, DELAYED RELEASE ORAL at 08:18

## 2024-03-22 NOTE — PHYSICAL THERAPY INITIAL EVALUATION ADULT - PATIENT PROFILE REVIEW, REHAB EVAL
PT orders received: strict bedrest 24 hours after TNK (Per EMR administered 3/20/24 @ 1309) (. Consult with KEISHA Butler, pt may participate in PT evaluation./yes

## 2024-03-22 NOTE — CASE MANAGEMENT PROGRESS NOTE - NSCMPROGRESSNOTE_GEN_ALL_CORE
Chart reviewed from a CM perspective.  Patient currently remains in ICU s/p CVA, tnk.  Patient was seen by physcial therapy and found to have no PT needs when transition ready.  Potential for transition home with no anticipated D/C needs.  Will remain available.

## 2024-03-22 NOTE — DISCHARGE NOTE PROVIDER - NSDCCPCAREPLAN_GEN_ALL_CORE_FT
PRINCIPAL DISCHARGE DIAGNOSIS  Diagnosis: Stroke  Assessment and Plan of Treatment: ACUTE CVA  - on asa and plavix , statin . follow up out pt dr Stroud  neuro with in 1wk.      SECONDARY DISCHARGE DIAGNOSES  Diagnosis: GERD (gastroesophageal reflux disease)  Assessment and Plan of Treatment: continue home  med.    Diagnosis: HLD (hyperlipidemia)  Assessment and Plan of Treatment: continue high dose statin  / lipitor.    Diagnosis: Pericardial effusion  Assessment and Plan of Treatment: repeat echo  trace   effusion - follow up out pt  your  cardio dr medellin  in 1 to 2wk.

## 2024-03-22 NOTE — PROGRESS NOTE ADULT - ASSESSMENT
88 year-old F with PMH  of supraventricular ectopy, palpitations, dyspnea, mild aortic insufficiency, mild carotid atherosclerosis, HLD, celiac disease, a hepatic cyst, a R kidney cyst, a cystocele, left thyroid nodules, osteoporosis, urinary stress incontinence (pessary) presents with slurred speech, now s/p TNK    Stroke, HLD   - CTA Brain showed core infarct and ischemic penumbra in the left MCA territory: CBF<30% volume: 3 ml. Left MCA territory, suggesting core infarct. Tmax>6.0s volume: 33 ml. Mismatch volume: 30 ml, left MCA territory, suggesting ischemic penumbra. Mismatch ratio: 11.   - CT angiography neck: No hemodynamically significant stenosis of the bilateral cervical ICAs using NASCET criteria.  Patent vertebral arteries.  No evidence of vascular dissection.   - CT angiography brain: Short segment occlusion of a left proximal M2 branch (304:304, 607:53, 604:159) with distal reconstitution. No evidence of aneurysm.   - S/P TNK in ER.   - Monitor in ICU  - Neurology following  - Monitor in tele for occult AF   - Please obtain transthoracic echocardiogram to assess ventricular function, wall motion and valvular abnormalities (EF normal in late 2023)    - -180s  - S/p labetalol 10 mg IV x 1   - Permissive HTN per neurology and then gradual normotension. Most recent BP in the 120s.     - EKG showed SR @ 64, LVH  - mild troponin leak noted, though no suggestion of acs  - No need to trend further    - ECHO 12/2023 with normal LV & RV size and function EF 57%, Trace MR, mild AR, PASP 27  - Repeat TTE 3/21/24 with normal LV function, probably normal RV function, RAP of 3, small to moderate pericardial effusion.   - Will need repeat limited TTE in 24 hrs for re-assessment of pericardial effusion  - No evidence of any meaningful volume overload     - Monitor and replete lytes, keep K>4, Mg>2.  - Will continue to follow.

## 2024-03-22 NOTE — PROGRESS NOTE ADULT - PROBLEM SELECTOR PLAN 1
ICU s/p TNK @ 1700  - CT head: Short segment occlusion of a left proximal M2 branch   - Bedside dysphagia screen failed will keep NPO for now and request formal speech /swallow eval for tomorrow  - s/p Labetalol 10mg IV x1 in ED in order to meet BP threshold for TNK administration  - Allow permissive HTN for first 48 hours, will not treat unless SBP>180 or DBP>105  - Repeat CT 24 hrs after TNK or sooner if neurological status changes  - MRI brain  see above result and TTE w/ bubble study see above result.   -  A1c 5.5, lipid panel ldl 75 ,   - Aspiration precautions  - Neuro checks Q1H  - High dose statin once no longer NPO. Consider starting AC and high dose asa pending neuro recs   -Teleneuro (Dr. Jimenez) consulted by the ED -- TNK recommended as per teleneuro -- no need for neuro intervention procedure  - Neuro consulted Dr. Davidson,   - Cardio consulted [facility],   - PT/OT eval-home pt  - Plan as per ICU

## 2024-03-22 NOTE — OCCUPATIONAL THERAPY INITIAL EVALUATION ADULT - NSOTDISCHREC_GEN_A_CORE
Recommend supervision/assist with functional activities which pt reports  can provide. Pt in agreement with d/c plans. CM aware./No skilled OT needs

## 2024-03-22 NOTE — DISCHARGE NOTE PROVIDER - HOSPITAL COURSE
Daily Assessment HPI:  89 y/o F with PMHx HLD, celiac disease and GERD presenting with slurred speech since around 1:30pm today. Pt was sitting at the kitchen table with her  when he noticed she was staring off into space and unable to respond appropriately to questioning with slurred speech. Pt has no prior hx of stroke. Pt reports headache at this time. Denies cp, sob.  at bedside providing collateral history.    IN THE ED:  Temp  98 F , HR 64 ,  /76  ,RR 18 , SpO2 97% RA  S/P Labetalol 10mg IV x1, TNK @ 1700  EKG: NSR @ 64bpm  Labs significant for: K 3.3, Trop 402  Imaging:  CT Brain Stroke Protocol -   CT perfusion:Core infarct and ischemic penumbra in the left MCA territory:  CBF<30% volume: 3 ml. Left MCA territory, suggesting core infarct.  Tmax>6.0s volume: 33 ml  Mismatch volume: 30 ml, left MCA territory, suggesting ischemic penumbra.  Mismatch ratio: 11    CT angiography neck: No hemodynamically significant stenosis of the   bilateral cervical ICAs using NASCET criteria.  Patent vertebral   arteries.  No evidence of vascular dissection.    CT angiography brain:  1.  Short segment occlusion of a left proximal M2 branch (304:304,   607:53, 604:159) with distal reconstitution.  2.  No evidence of aneurysm. (20 Mar 2024 18:23)      ---  HOSPITAL COURSE:   Patient received TNK on 3/20 in the ED and then admitted for CVA and monitoring s/p TNK. ASA 325mg qd and statin started. Initial speech and swallow failed, however formal speech and swallow... ******* MRI performed and confirmed L MCA CVA. Aspirin changed top 81mg qd and Plavix 75 initiated. TTE performed and was normal. Physical therapy and occupational therapy evaluated patient and determined there were no skilled PT or OT needs.  ---  CONSULTANTS:     ---  TIME SPENT:  I, the attending physician, was physically present for the key portions of the evaluation and management (E/M) service provided. The total amount of time spent reviewing the hospital notes, laboratory values, imaging findings, assessing/counseling the patient, discussing with consultant physicians, social work, nursing staff was -- minutes    ---  Primary care provider was made aware of plan for discharge:      [  ] NO     [  ] YES   HPI:  89 y/o F with PMHx HLD, celiac disease and GERD presenting with slurred speech since around 1:30pm today. Pt was sitting at the kitchen table with her  when he noticed she was staring off into space and unable to respond appropriately to questioning with slurred speech. Pt has no prior hx of stroke. Pt reports headache at this time. Denies cp, sob.  at bedside providing collateral history.    IN THE ED:  Temp  98 F , HR 64 ,  /76  ,RR 18 , SpO2 97% RA  S/P Labetalol 10mg IV x1, TNK @ 1700  EKG: NSR @ 64bpm  Labs significant for: K 3.3, Trop 402  Imaging:  CT Brain Stroke Protocol -   CT perfusion:Core infarct and ischemic penumbra in the left MCA territory:  CBF<30% volume: 3 ml. Left MCA territory, suggesting core infarct.  Tmax>6.0s volume: 33 ml  Mismatch volume: 30 ml, left MCA territory, suggesting ischemic penumbra.  Mismatch ratio: 11    CT angiography neck: No hemodynamically significant stenosis of the   bilateral cervical ICAs using NASCET criteria.  Patent vertebral   arteries.  No evidence of vascular dissection.    CT angiography brain:  1.  Short segment occlusion of a left proximal M2 branch (304:304,   607:53, 604:159) with distal reconstitution.  2.  No evidence of aneurysm. (20 Mar 2024 18:23)      ---  HOSPITAL COURSE:   Patient received TNK on 3/20 in the ED and then admitted for CVA and monitoring s/p TNK. ASA 325mg qd and statin started. Patient initially failed bedside speech and swallow, however passed formal speech and swallow evaluation and tolerated regular solids. MRI performed and confirmed L MCA CVA. Aspirin changed top 81mg qd and Plavix 75 initiated. TTE performed with normal systolic function, however small to moderate pericardial effusion found on imaging. Physical therapy and occupational therapy evaluated patient and determined there were no skilled PT or OT needs. Patient is stable for discharge with close outpatient follow up.    Repeat TTE showed:  ---  CONSULTANTS:   Neuro: Dr. Davidson  Cardio: [facility]  ---  TIME SPENT:  I, the attending physician, was physically present for the key portions of the evaluation and management (E/M) service provided. The total amount of time spent reviewing the hospital notes, laboratory values, imaging findings, assessing/counseling the patient, discussing with consultant physicians, social work, nursing staff was -- minutes    ---  Primary care provider was made aware of plan for discharge:      [  ] NO     [  ] YES   HPI:  89 y/o F with PMHx HLD, celiac disease and GERD presenting with slurred speech since around 1:30pm today. Pt was sitting at the kitchen table with her  when he noticed she was staring off into space and unable to respond appropriately to questioning with slurred speech. Pt has no prior hx of stroke. Pt reports headache at this time. Denies cp, sob.  at bedside providing collateral history.    IN THE ED:  Temp  98 F , HR 64 ,  /76  ,RR 18 , SpO2 97% RA  S/P Labetalol 10mg IV x1, TNK @ 1700  EKG: NSR @ 64bpm  Labs significant for: K 3.3, Trop 402  Imaging:  CT Brain Stroke Protocol -   CT perfusion:Core infarct and ischemic penumbra in the left MCA territory:  CBF<30% volume: 3 ml. Left MCA territory, suggesting core infarct.  Tmax>6.0s volume: 33 ml  Mismatch volume: 30 ml, left MCA territory, suggesting ischemic penumbra.  Mismatch ratio: 11    CT angiography neck: No hemodynamically significant stenosis of the   bilateral cervical ICAs using NASCET criteria.  Patent vertebral   arteries.  No evidence of vascular dissection.    CT angiography brain:  1.  Short segment occlusion of a left proximal M2 branch (304:304,   607:53, 604:159) with distal reconstitution.  2.  No evidence of aneurysm. (20 Mar 2024 18:23)      ---  HOSPITAL COURSE:   Patient received TNK on 3/20 in the ED and then admitted for  slurred speech found to have  acute CVA and monitoring s/p TNK. ASA 325mg qd and statin started. Patient initially failed bedside speech and swallow, however passed formal speech and swallow evaluation and tolerated regular solids. MRI performed and confirmed L MCA CVA. Aspirin changed top 81mg qd and Plavix 75 initiated with high dose statin / Lipitor . cardio saw pt No evidence of Afib on tele so far- need outpatient ILR.  echo done first   - TTE showed normal LV/RVF with no significant valvular disease with small to moderate pericardial effusion.   Repeat showed trace effusion    as per cardio dr april grossman ok dc home follow up out pt    cardio dr medellin office  in 1 to 2wk. Physical therapy and occupational therapy evaluated patient and determined there were no skilled PT or OT needs. Patient is stable for discharge with close outpatient follow up.  physical exam day 3/24/24   Vital Signs Last 24 Hrs  T(C): 36.4 (24 Mar 2024 04:12), Max: 36.9 (23 Mar 2024 20:13)  T(F): 97.5 (24 Mar 2024 04:12), Max: 98.5 (23 Mar 2024 20:13)  HR: 73 (24 Mar 2024 04:12) (73 - 77)  BP: 154/73 (24 Mar 2024 04:12) (108/67 - 154/73)  BP(mean): --  RR: 18 (24 Mar 2024 04:12) (17 - 18)  SpO2: 95% (24 Mar 2024 04:12) (92% - 95%)    Parameters below as of 24 Mar 2024 04:12  Patient On (Oxygen Delivery Method): room air    PHYSICAL EXAM:  GENERAL: NAD,   HEAD:  Atraumatic, Normocephalic  EYES: EOMI, PERRLA, conjunctiva and sclera clear  ENMT: Moist mucous membranes  NECK: Supple, No JVD  NERVOUS SYSTEM:  Alert & Oriented X3; Motor Strength 5/5 B/L upper and lower extremities; DTRs 2+ intact and symmetric  CHEST/LUNG:   percussion bilaterally; No rales, rhonchi, wheezing   HEART: Regular rate and rhythm; No murmurs,  no tachy  ABDOMEN: Soft, Nontender, Nondistended; Bowel sounds present  EXTREMITIES:  2+ Peripheral Pulses, No clubbing, cyanosis, or edema  SKIN: No rashes or lesions    ---  CONSULTANTS:   Neuro: Dr. Davidson  Cardio: [facility]  ---  TIME SPENT:  I, the attending physician, was physically present for the key portions of the evaluation and management (E/M) service provided. The total amount of time spent reviewing the hospital notes, laboratory values, imaging findings, assessing/counseling the patient, discussing with consultant physicians, social work, nursing staff was50 -- minutes

## 2024-03-22 NOTE — PROGRESS NOTE ADULT - SUBJECTIVE AND OBJECTIVE BOX
Patient is a 88y old  Female who presents with a chief complaint of CVA (22 Mar 2024 11:06)    24 hour events: No overnight events. Patient seen and examined at bedside in ICU. Reports she feels much better, feels her speech is back at baseline. She has word finding difficulties however states this has been her baseline and attributes it to her age. Denies fevers/chills, HA, dizziness, LH, cp, palp, cough, sob, n/v/d/c, dysuria.      T(F): 98 (03-22-24 @ 12:00), Max: 98.5 (03-21-24 @ 21:06)  HR: 74 (03-22-24 @ 14:00) (60 - 86)  BP: 106/52 (03-22-24 @ 14:00) (95/51 - 182/80)  RR: 20 (03-22-24 @ 14:00) (14 - 45)  SpO2: 97% (03-22-24 @ 14:00) (95% - 99%)  Wt(kg): --            I&O's Summary    03-21 @ 07:01  -  03-22 @ 07:00  --------------------------------------------------------  IN: 840 mL / OUT: 1700 mL / NET: -860 mL    03-22 @ 07:01  -  03-22 @ 14:06  --------------------------------------------------------  IN: 320 mL / OUT: 0 mL / NET: 320 mL      PHYSICAL EXAM  General: well-appearing, NAD  CNS: AAO x3. No facial droop, no ptosis, no dysarthria. Very slight delay in initiation of speech. No focally diminished sensation to fine touch on face, B/L upper/lower extremities. Motor strength 5/5 in all 4 extremities.   HEENT: NCAT, EOMI, PERRL, sclerae/conjunctivae clear  Resp: CTA BL  CVS: S1S2, RRR, no murmurs appreciated  Abd: NDNT, BS+ x4 quadrants  Ext: No calf tenderness BL. No C/C/E x4 extremities. DP Pulses 1+ BL.    MEDICATIONS    hydrALAZINE Injectable IV Push PRN    atorvastatin Oral      acetaminophen     Tablet .. Oral PRN      aspirin enteric coated Oral  clopidogrel Tablet Oral  enoxaparin Injectable SubCutaneous    pantoprazole    Tablet Oral          chlorhexidine 2% Cloths Topical                            14.5   7.08  )-----------( 294      ( 22 Mar 2024 09:10 )             43.2       03-22    143  |  107  |  13  ----------------------------<  148<H>  3.9   |  26  |  0.93    Ca    9.3      22 Mar 2024 09:10  Phos  3.1     03-22  Mg     2.1     03-22    TPro  6.2  /  Alb  3.2<L>  /  TBili  0.6  /  DBili  x   /  AST  31  /  ALT  31  /  AlkPhos  67  03-22          PT/INR - ( 20 Mar 2024 16:05 )   PT: 11.1 sec;   INR: 0.95 ratio         PTT - ( 20 Mar 2024 16:05 )  PTT:28.3 sec  Urinalysis Basic - ( 22 Mar 2024 09:10 )    Color: x / Appearance: x / SG: x / pH: x  Gluc: 148 mg/dL / Ketone: x  / Bili: x / Urobili: x   Blood: x / Protein: x / Nitrite: x   Leuk Esterase: x / RBC: x / WBC x   Sq Epi: x / Non Sq Epi: x / Bacteria: x      Clean Catch Clean Catch (Midstream)   <10,000 CFU/mL Normal Urogenital Yaima -- 03-20 @ 18:41        Radiology: ***< from: TTE W or WO Ultrasound Enhancing Agent (03.21.24 @ 12:28) >  TRANSTHORACIC ECHOCARDIOGRAM REPORT  ________________________________________________________________________________                                      _______       Pt. Name:       ALPA BHATIA Study Date:    3/21/2024  MRN:            [id number]         YOB: 1935  Accession #:    [id number]        Age:           88 years  Account#:       [id number]       Gender:        F  Heart Rate:                      Height:        58.66 in (149.00 cm)  Rhythm:                          Weight:        103.62 lb (47.00 kg)  Blood Pressure: 185/78 mmHg      BSA/BMI:       1.39 m² / 21.17 kg/m²  ________________________________________________________________________________________  Referring Physician:    Prabhjot Castro  Interpreting Physician: Ever Valle  Primary Sonographer:    Marcela Patino [facility]    CPT:               ECHO TTE WO CON COMP W DOPP - 40557.m  Indication(s):     Cerebral infarction due to unspecified occlusion or stenosis                     of unspecified cerebral artery - I63.50  Procedure:         Transthoracic echocardiogram with 2-D, M-mode and complete                     spectral and color flow Doppler.  Ordering Location: [facility]  Admission Status:  Inpatient  Study Information: Image quality for this study is technically difficult.    _______________________________________________________________________________________     CONCLUSIONS:      1. Technically difficult image quality.   2. Left ventricular cavity is normal in size.   3. Left ventricular endocardium is not well visualized; however, the left ventricular systolic function appears grossly normal.   4. The right ventricle is not well visualized. probably normal systolic function.   5. The left atrium is normal.   6. The right atrium isnormal in size.   7. Aortic valve was not well visualized.   8. There is mild calcification of the aortic valve leaflets.   9. Mild aortic regurgitation.  10. Structurally normal pulmonic valve with normal leaflet excursion.  11. There is mild calcification of the mitral valve annulus.  12. Structurally normal tricuspid valve with normal leaflet excursion. Mild to moderate tricuspid regurgitation.  13. Estimated pulmonary artery systolic pressure is 28 mmHg, consistent with normal pulmonary artery pressure.  14. The inferior vena cava is normal in size (normal <2.1cm) with normal inspiratory collapse (normal >50%) consistent with normal right atrial pressure (~3, range 0-5mmHg).  15. Small to moderate pericardial effusion identified anterior to the RA and RV, without evidence of elevated pericardial pressure.    ________________________________________________________________________________________  FINDINGS:     Left Ventricle:  The left ventricular cavity is normal in size. Left ventricular endocardium is not well visualized; however, the left ventricular systolic function appears grossly normal.     Right Ventricle:  The right ventricle is not well visualized. Probably normal systolic function.     Left Atrium:  The left atrium isnormal.     Right Atrium:  The right atrium is normal in size.     Aortic Valve:  The aortic valve was not well visualized. The aortic valve anatomy cannot be determined. There is mild calcification of the aortic valve leaflets. There is mild aortic regurgitation.     Mitral Valve:  There is mild calcification of the mitral valve annulus.     Tricuspid Valve:  Structurally normal tricuspid valve with normal leaflet excursion. There is mild to moderate tricuspid regurgitation. Estimated pulmonary artery systolic pressure is 28 mmHg, consistent with normal pulmonary artery pressure.     Pulmonic Valve:  Structurally normal pulmonic valve with normal leaflet excursion.     Pericardium:  Small to moderate pericardial effusion identified anterior to the RA and RV, without evidence of elevated pericardial pressure.     Systemic Veins:  The inferior vena cava is normal in size (normal <2.1cm) with normal inspiratory collapse (normal >50%) consistent with normal right atrial pressure (~3, range 0-5mmHg).  ____________________________________________________________________  QUANTITATIVE DATA:  Left Ventricle Measurements: (Indexed to BSA)     IVSd (2D):   0.7 cm  LVPWd (2D):  0.9 cm  LVIDd (2D):  3.5 cm  LVIDs (2D):  2.4 cm  LV Mass:     77 g  55.7 g/m²     MV E Vmax:    0.68 m/s  MV A Vmax:    0.81 m/s  MV E/A:       0.84  e' lateral:   6.64 cm/s  e' medial:    5.55 cm/s  E/e' lateral: 10.18  E/e' medial:  12.18  E/e' Average: 11.09  MV DT:        239 msec    Aorta Measurements: (Normalrange) (Indexed to BSA)     Sinuses of Valsalva: 2.40 cm (2.7 - 3.3 cm)       Left Atrium Measurements: (Indexed to BSA)  LA Diam 2D: 2.40 cm    Mitral Valve Measurements:     MV E Vmax: 0.7 m/s  MV A Vmax: 0.8 m/s  MV E/A:    0.8       Tricuspid Valve Measurements:     TR Vmax:          2.5 m/s  TR Peak Gradient: 24.8 mmHg  RA Pressure:      3 mmHg  PASP:             28 mmHg  TR VTI            0.72 m    ________________________________________________________________________________________  Electronically signed on 3/21/2024 at 5:44:18 PM by Ever Valle         *** Final ***    < end of copied text >      GLOBAL ISSUE/BEST PRACTICE  Analgesia: N  Sedation: N  CAM-ICU: Delirium absent  HOB elevation: yes  Stress ulcer prophylaxis: N  VTE prophylaxis: N (S/p TNK)  Glycemic control: N  Nutrition: regular diet    CODE STATUS: full

## 2024-03-22 NOTE — OCCUPATIONAL THERAPY INITIAL EVALUATION ADULT - BALANCE TRAINING, PT EVAL
Pt will improve dynamic standing balance from Fair to Good by 1-2 sessions in prep for standing ADLs.

## 2024-03-22 NOTE — DISCHARGE NOTE PROVIDER - NSDCMRMEDTOKEN_GEN_ALL_CORE_FT
metoprolol succinate 25 mg oral tablet, extended release: 1 tab(s) orally once a day  pantoprazole 40 mg oral delayed release tablet: 1 tab(s) orally once a day  rosuvastatin 5 mg oral tablet: 1 tab(s) orally once a day  Vagifem 10 mcg vaginal tablet: 1 tab(s) intravaginally 2 times a week   aspirin 81 mg oral delayed release tablet: 1 tab(s) orally once a day  atorvastatin 80 mg oral tablet: 1 tab(s) orally once a day (at bedtime)  clopidogrel 75 mg oral tablet: 1 tab(s) orally once a day  metoprolol succinate 25 mg oral tablet, extended release: 1 tab(s) orally once a day  pantoprazole 40 mg oral delayed release tablet: 1 tab(s) orally once a day  Vagifem 10 mcg vaginal tablet: 1 tab(s) intravaginally 2 times a week

## 2024-03-22 NOTE — OCCUPATIONAL THERAPY INITIAL EVALUATION ADULT - ADL RETRAINING, OT EVAL
Pt will complete LB dressing, with AE as needed, with modified independence +set-up by 1-2 sessions. Pt will complete grooming activity while standing at the sink with modified independence by 1-2 sessions.

## 2024-03-22 NOTE — PHYSICAL THERAPY INITIAL EVALUATION ADULT - GAIT TRAINING, PT EVAL
Patient will ambulate >200 feet independently without AD by 2 weeks to allow for increased independence in the community.

## 2024-03-22 NOTE — PHYSICAL THERAPY INITIAL EVALUATION ADULT - ADDITIONAL COMMENTS
Pt lives with her spouse in a private house, 3 MARC with 1 flight within however pt remains on first level. Patient reports being independent in ADLs and ambulation prior to admission. Pt owns RW & cane however does not use AD. +driving.

## 2024-03-22 NOTE — PROGRESS NOTE ADULT - SUBJECTIVE AND OBJECTIVE BOX
Patient is a 88y old  Female who presents with a chief complaint of CVA (22 Mar 2024 09:32)  pt seen and examine state feeling much better slurred  speech  improved   /  bedside .    INTERVAL HPI/OVERNIGHT EVENTS:     T(C): 36.3 (03-22-24 @ 07:34), Max: 36.9 (03-21-24 @ 21:06)  HR: 76 (03-22-24 @ 10:05) (57 - 86)  BP: 96/54 (03-22-24 @ 10:05) (96/54 - 184/77)  RR: 18 (03-22-24 @ 09:00) (14 - 45)  SpO2: 98% (03-22-24 @ 10:05) (95% - 100%)  Wt(kg): --  I&O's Summary    21 Mar 2024 07:01  -  22 Mar 2024 07:00  --------------------------------------------------------  IN: 840 mL / OUT: 1700 mL / NET: -860 mL    22 Mar 2024 07:01  -  22 Mar 2024 11:07  --------------------------------------------------------  IN: 320 mL / OUT: 0 mL / NET: 320 mL        REVIEW OF SYSTEMS:  CONSTITUTIONAL: No fever, weight loss, or fatigue  EYES: No eye pain, visual disturbances, or discharge  ENMT:  ; No sinus or throat pain  NECK: No pain or stiffness  BREASTS: No pain, no masses  RESPIRATORY: No cough, wheezing, chills   No shortness of breath  CARDIOVASCULAR: No chest pain, palpitations, dizziness, or leg swelling  GASTROINTESTINAL: No abdominal or epigastric pain. No nausea, vomiting, No diarrhea or constipation.   GENITOURINARY: No dysuria, frequency, hematuria, or incontinence  NEUROLOGICAL: No headaches, memory loss, loss of strength, numbness, or tremors  SKIN: No itching, burning, rashes, or lesions   MUSCULOSKELETAL: No joint pain or swelling; No muscle, back, or extremity pain    PHYSICAL EXAM:  GENERAL: NAD,   HEAD:  Atraumatic, Normocephalic  EYES: EOMI, PERRLA, conjunctiva and sclera clear  ENMT: Moist mucous membranes  NECK: Supple, No JVD  NERVOUS SYSTEM:  Alert & Oriented X3; Motor Strength 5/5 B/L upper and lower extremities; DTRs 2+ intact and symmetric  CHEST/LUNG:   percussion bilaterally; No rales, rhonchi, wheezing   HEART: Regular rate and rhythm; No murmurs,  no tachy  ABDOMEN: Soft, Nontender, Nondistended; Bowel sounds present  EXTREMITIES:  2+ Peripheral Pulses, No clubbing, cyanosis, or edema  SKIN: No rashes or lesions        MEDICATIONS  (STANDING):  aspirin enteric coated 81 milliGRAM(s) Oral daily  atorvastatin 80 milliGRAM(s) Oral at bedtime  chlorhexidine 2% Cloths 1 Application(s) Topical <User Schedule>  clopidogrel Tablet 75 milliGRAM(s) Oral daily  enoxaparin Injectable 40 milliGRAM(s) SubCutaneous every 24 hours  pantoprazole    Tablet 40 milliGRAM(s) Oral before breakfast    MEDICATIONS  (PRN):  acetaminophen     Tablet .. 650 milliGRAM(s) Oral every 6 hours PRN Mild Pain (1 - 3)  hydrALAZINE Injectable 10 milliGRAM(s) IV Push every 6 hours PRN BP control      LABS:                        14.5   7.08  )-----------( 294      ( 22 Mar 2024 09:10 )             43.2     03-21    148<H>  |  108  |  8   ----------------------------<  87  3.2<L>   |  34<H>  |  0.61    Ca    9.2      21 Mar 2024 05:28  Phos  3.5     03-21  Mg     2.0     03-21    TPro  6.5  /  Alb  3.2<L>  /  TBili  0.6  /  DBili  x   /  AST  34  /  ALT  39  /  AlkPhos  67  03-21    PT/INR - ( 20 Mar 2024 16:05 )   PT: 11.1 sec;   INR: 0.95 ratio         PTT - ( 20 Mar 2024 16:05 )  PTT:28.3 sec  Urinalysis Basic - ( 21 Mar 2024 05:28 )    Color: x / Appearance: x / SG: x / pH: x  Gluc: 87 mg/dL / Ketone: x  / Bili: x / Urobili: x   Blood: x / Protein: x / Nitrite: x   Leuk Esterase: x / RBC: x / WBC x   Sq Epi: x / Non Sq Epi: x / Bacteria: x            03-20 @ 18:41   <10,000 CFU/mL Normal Urogenital Yaima  --  --          RADIOLOGY & ADDITIONAL TESTS:    Imaging Personally Reviewed:     IMPRESSION:    ACUTE LEFT MCA TERRITORY INFARCT. THERE IS NO EVIDENCE OF ACUTE   INTRACRANIAL HEMORRHAGE.    SHORT SEGMENT OCCLUSION OF A LEFT PROXIMAL M2 BRANCH WITH DISTAL   RECONSTITUTION. ASYMMETRICALLY DIMINISHED DISTAL LEFT MCA BRANCHES.    echo - . Left ventricular cavity is normal in size.   3. Left ventricular endocardium is not well visualized; however, the left ventricular systolic function appears grossly normal.   4. The right ventricle is not well visualized. probably normal systolic function.   5. The left atrium is normal.   6. The right atrium is normal in size.   7. Aortic valve was not well visualized.   8. There is mild calcification of the aortic valve leaflets.   9. Mild aortic regurgitation.  10. Structurally normal pulmonic valve with normal leaflet excursion.  11. There is mild calcification of the mitral valve annulus.  12. Structurally normal tricuspid valve with normal leaflet excursion. Mild to moderate tricuspid regurgitation.  13. Estimated pulmonary artery systolic pressure is 28 mmHg, consistent with normal pulmonary artery pressure.  14. The inferior vena cava is normal in size (normal <2.1cm) with normal inspiratory collapse (normal >50%) consistent with normal right atrial pressure (~3, range 0-5mmHg).  15. Small to moderate pericardial effusion identified anterior to the RA and RV, without evidence of elevated pericardial pressure.    Advance Directives:  full code    Palliative Care:  Appropriate

## 2024-03-22 NOTE — OCCUPATIONAL THERAPY INITIAL EVALUATION ADULT - ADDITIONAL COMMENTS
Pt lives in a private home with her  with 3 MARC + HR and no stairs to negotiate inside the home. Pt reports her laundry is downstairs (full flight) but  can assist. Pt has a tub bath with grab bars. Pt reports she was independent with all ADLs and ambulated without a device. Pt owns a cane and RW.   Pt completed functional mobility +RW with supervision 50ft x2.

## 2024-03-22 NOTE — PROGRESS NOTE ADULT - ATTENDING COMMENTS
89 yo woman with Hx SVT, HLD, celiac disease presenting with slurred speech and R sided weakness worrisome for CVA.  Received TNK 3/20 in ED.  Now near resolution of aphasia and R weakness.    --L MCA stroke confirmed on MRI   continue ASA 81 and plavix  cleared by PT/OT/swallow evals  --mild hypotension, continue to hold lopressor  hold on d/c  echo with sm-mod pericardial effusion, will repeat limited echo in am to reassess  --stable respiratory status  --normal renal function  --tolerating regular diet  --no infectious concerns  --discussed with neuro  --discussed with pt and family at bedside  --stable for tele
89 yo woman with Hx SVT, HLD, celiac disease presenting with slurred speech and R sided weakness worrisome for CVA.  Received TNK 3/20 in ED.  Now near resolution of aphasia and R weakness.    --L MCA stroke confirmed on MRI today  no evidence of bleed  start  and high dose statin  f/u echo  PT/OT/swallow evals  --permissive htn  prn hydralazine for SBP >180  --stable respiratory status  --normal renal function  --cleared for regular diet  --no infectious concerns  --discussed with neuro  --discussed with pt and family at bedside

## 2024-03-22 NOTE — PROGRESS NOTE ADULT - SUBJECTIVE AND OBJECTIVE BOX
[facility] - Tori Vila, Manohar Damon, Michele Ortega  Office Number:  [phone]    Patient resting comfortably in bed in NAD.  Laying flat with no respiratory distress.  No complaints of chest pain, dyspnea, palpitations, PND, or orthopnea.  Na 148 this AM, hypokalemic    ROS: negative unless otherwise mentioned.    Telemetry: SR 60s    MEDICATIONS  (STANDING):  aspirin enteric coated 325 milliGRAM(s) Oral daily  atorvastatin 80 milliGRAM(s) Oral at bedtime  chlorhexidine 2% Cloths 1 Application(s) Topical <User Schedule>  enoxaparin Injectable 40 milliGRAM(s) SubCutaneous every 24 hours  pantoprazole    Tablet 40 milliGRAM(s) Oral before breakfast    MEDICATIONS  (PRN):  acetaminophen     Tablet .. 650 milliGRAM(s) Oral every 6 hours PRN Mild Pain (1 - 3)  hydrALAZINE Injectable 10 milliGRAM(s) IV Push every 6 hours PRN BP control      Allergies    No Known Drug Allergies  Gluten (Flatulence; Other; Diarrhea)    Intolerances        Vital Signs Last 24 Hrs  T(C): 36.3 (22 Mar 2024 07:34), Max: 36.9 (21 Mar 2024 21:06)  T(F): 97.3 (22 Mar 2024 07:34), Max: 98.5 (21 Mar 2024 21:06)  HR: 76 (22 Mar 2024 08:00) (54 - 86)  BP: 124/59 (22 Mar 2024 08:00) (100/50 - 184/77)  BP(mean): 84 (22 Mar 2024 08:00) (72 - 129)  RR: 16 (22 Mar 2024 08:00) (14 - 45)  SpO2: 97% (22 Mar 2024 08:00) (95% - 100%)    Parameters below as of 22 Mar 2024 07:00  Patient On (Oxygen Delivery Method): room air        I&O's Summary    21 Mar 2024 07:01  -  22 Mar 2024 07:00  --------------------------------------------------------  IN: 840 mL / OUT: 1700 mL / NET: -860 mL        ON EXAM:    General: NAD, awake and alert, oriented x 3  HEENT: Mucous membranes are moist, anicteric  Lungs: Non-labored, breath sounds are clear bilaterally, No wheezing, rales or rhonchi  Cardiovascular: Regular, S1 and S2, no murmurs, rubs, or gallops  Gastrointestinal: Bowel Sounds present, soft, nontender.   Lymph: No peripheral edema. No lymphadenopathy.  Skin: No rashes or ulcers  Psych:  Mood & affect appropriate    LABS: All Labs Reviewed:                        13.7   5.73  )-----------( 308      ( 21 Mar 2024 05:28 )             41.4                         14.2   6.64  )-----------( 323      ( 20 Mar 2024 16:05 )             43.5     21 Mar 2024 05:28    148    |  108    |  8      ----------------------------<  87     3.2     |  34     |  0.61   20 Mar 2024 16:05    144    |  106    |  12     ----------------------------<  117    3.3     |  32     |  0.76     Ca    9.2        21 Mar 2024 05:28  Ca    9.4        20 Mar 2024 16:05  Phos  3.5       21 Mar 2024 05:28  Mg     2.0       21 Mar 2024 05:28    TPro  6.5    /  Alb  3.2    /  TBili  0.6    /  DBili  x      /  AST  34     /  ALT  39     /  AlkPhos  67     21 Mar 2024 05:28  TPro  7.0    /  Alb  3.6    /  TBili  0.5    /  DBili  x      /  AST  31     /  ALT  30     /  AlkPhos  72     20 Mar 2024 16:05    PT/INR - ( 20 Mar 2024 16:05 )   PT: 11.1 sec;   INR: 0.95 ratio         PTT - ( 20 Mar 2024 16:05 )  PTT:28.3 sec      Blood Culture: Organism --  Gram Stain Blood -- Gram Stain --  Specimen Source Clean Catch Clean Catch (Midstream)  Culture-Blood --      TTE 3/21/24:  CONCLUSIONS:      1. Technically difficult image quality.   2. Left ventricular cavity is normal in size.   3. Left ventricular endocardium is not well visualized; however, the left ventricular systolic function appears grossly normal.   4. The right ventricle is not well visualized. probably normal systolic function.   5. The left atrium is normal.   6. The right atrium isnormal in size.   7. Aortic valve was not well visualized.   8. There is mild calcification of the aortic valve leaflets.   9. Mild aortic regurgitation.  10. Structurally normal pulmonic valve with normal leaflet excursion.  11. There is mild calcification of the mitral valve annulus.  12. Structurally normal tricuspid valve with normal leaflet excursion. Mild to moderate tricuspid regurgitation.  13. Estimated pulmonary artery systolic pressure is 28 mmHg, consistent with normal pulmonary artery pressure.  14. The inferior vena cava is normal in size (normal <2.1cm) with normal inspiratory collapse (normal >50%) consistent with normal right atrial pressure (~3, range 0-5mmHg).  15. Small to moderate pericardial effusion identified anterior to the RA and RV, without evidence of elevated pericardial pressure.

## 2024-03-22 NOTE — DISCHARGE NOTE PROVIDER - PROVIDER TOKENS
PROVIDER:[TOKEN:[[id number]]] PROVIDER:[TOKEN:[[id number]]],PROVIDER:[TOKEN:[[id number]]]

## 2024-03-22 NOTE — DISCHARGE NOTE PROVIDER - CARE PROVIDER_API CALL
Theo Stroud  Neurology  [address], [address]  [place] [address]  Phone: [phone]  Fax: [phone]  Follow Up Time:    Theo Stroud  Neurology  [address], [address]  [place] [address]  Phone: [phone]  Fax: [phone]  Follow Up Time:     Bear Vila  Cardiovascular Disease  [address] [place] [address]  Phone: [phone]  Fax: [phone]  Follow Up Time:

## 2024-03-22 NOTE — PHYSICAL THERAPY INITIAL EVALUATION ADULT - PERTINENT HX OF CURRENT PROBLEM, REHAB EVAL
Per H&P, pt is an 89 y/o F with PMHx HLD, celiac disease and GERD presenting with slurred speech since around 1:30pm today. Pt was sitting at the kitchen table with her  when he noticed she was staring off into space and unable to respond appropriately to questioning with slurred speech. Pt has no prior hx of stroke. Pt reports headache at this time. Denies cp, sob.  at bedside providing collateral history.    Pt s/p TNK 3/20/24 @ 1707    MR Head: +ACUTE LEFT MCA TERRITORY INFARCT. THERE IS NO EVIDENCE OF ACUTE   INTRACRANIAL HEMORRHAGE.

## 2024-03-22 NOTE — OCCUPATIONAL THERAPY INITIAL EVALUATION ADULT - DIAGNOSIS, OT EVAL
Pt with generalized weakness and impaired balance impacting ability to complete ADLs, IADLs, functional mobility/transfers.

## 2024-03-22 NOTE — OCCUPATIONAL THERAPY INITIAL EVALUATION ADULT - PERTINENT HX OF CURRENT PROBLEM, REHAB EVAL
Per H&P: "Pt is an 89 y/o F with PMHx HLD, celiac disease and GERD presenting with slurred speech since around 1:30pm today. Pt was sitting at the kitchen table with her  when he noticed she was staring off into space and unable to respond appropriately to questioning with slurred speech. Pt has no prior hx of stroke. Pt reports headache at this time. Denies cp, sob.  at bedside providing collateral history."  Pt s/p TNK 3/20/24 @ 1707  MR Head: +ACUTE LEFT MCA TERRITORY INFARCT. THERE IS NO EVIDENCE OF ACUTE   INTRACRANIAL HEMORRHAGE.  Admission dx: Cerebral Infarct

## 2024-03-22 NOTE — PHYSICAL THERAPY INITIAL EVALUATION ADULT - PLANNED THERAPY INTERVENTIONS, PT EVAL
Patient will negotiate 3 steps with step-to pattern and bilateral handrail with supervision by 2 weeks to allow for increased independence in the community./gait training

## 2024-03-22 NOTE — PROGRESS NOTE ADULT - ASSESSMENT
Assessment  87 yo F with PMHx HLD, Celiac, GERD, Supraventricular Ectopy presents to [facility] ED with slurred speech and R arm weakness, admitted for CVA s/p TNK.    Plan   #Neuro: Acute CVA  - CT Head Non Con initially negative; CT Brain Stroke protocol showed core infarct and ischemic penumbra in L MCA; results c/w M2 segment ischemia  - MRI confirmed L MCA CVA  - S/p TNK 3/20 at 17:00  - Passed S&S - PO diet  - High dose statin, aspirin, and plavix initiated  - A1C wnl; Lipid panel wnl    #CV  - ?Hx CAD on chart review  - Hx Supraventricular ectopy on outpatient EP workup on chart review, pending placement of holter monitor  - Blood pressure now normal/low - will hold reintroducing home antihypertensives for now  - ECHO 12/2023 with normal LV & RV size and function EF 57%, Trace MR, mild AR, PASP   - TTE with small to moderate pericardial effusion - will need repeat TTE in 24 hours to reassess    #PULMONARY   - No active issues, satting well on RA    #INFECTIOUS DISEASE   - No active issues    #GI  - Resume home Pantoprazole for hx GERD    #RENAL and acid base   - No active issues  - Monitor and replete lytes PRN; goal K > 4, Mg > 2, Phos > 3    #Endocrine   - No active issues    #Hematology   - No active issues, Hgb stable    #Code Status: Full code

## 2024-03-22 NOTE — DISCHARGE NOTE PROVIDER - CARE PROVIDERS DIRECT ADDRESSES
,DirectAddress_Unknown ,DirectAddress_Unknown,[email or web address]

## 2024-03-22 NOTE — DISCHARGE NOTE PROVIDER - NSDCFUSCHEDAPPT_GEN_ALL_CORE_FT
Makayla Wang  [facility] Physician Formerly [facility]  INTMED [address]  Scheduled Appointment: 04/03/2024    Nahomi Orantes  [facility] Physician Formerly [facility]  UROGYN [address]  Scheduled Appointment: 05/06/2024    Maria Ines Avila  [facility] Physician Formerly [facility]  GASTRO [address]  Scheduled Appointment: 05/23/2024    Bear Vila  [facility] Physician [facility]  CARDIOLOGY [address]  Scheduled Appointment: 06/04/2024

## 2024-03-22 NOTE — PROGRESS NOTE ADULT - SUBJECTIVE AND OBJECTIVE BOX
Neurology follow up note    ALPA [id number]      Interval History:    Patient feels ok no new complaints.    Allergies    No Known Drug Allergies  Gluten (Flatulence; Other; Diarrhea)    Intolerances        MEDICATIONS    acetaminophen     Tablet .. 650 milliGRAM(s) Oral every 6 hours PRN  aspirin enteric coated 325 milliGRAM(s) Oral daily  atorvastatin 80 milliGRAM(s) Oral at bedtime  chlorhexidine 2% Cloths 1 Application(s) Topical <User Schedule>  enoxaparin Injectable 40 milliGRAM(s) SubCutaneous every 24 hours  hydrALAZINE Injectable 10 milliGRAM(s) IV Push every 6 hours PRN  pantoprazole    Tablet 40 milliGRAM(s) Oral before breakfast              Vital Signs Last 24 Hrs  T(C): 36.3 (22 Mar 2024 07:34), Max: 36.9 (21 Mar 2024 21:06)  T(F): 97.3 (22 Mar 2024 07:34), Max: 98.5 (21 Mar 2024 21:06)  HR: 76 (22 Mar 2024 08:00) (54 - 86)  BP: 124/59 (22 Mar 2024 08:00) (100/50 - 184/77)  BP(mean): 84 (22 Mar 2024 08:00) (72 - 129)  RR: 16 (22 Mar 2024 08:00) (14 - 45)  SpO2: 97% (22 Mar 2024 08:00) (95% - 100%)    Parameters below as of 22 Mar 2024 07:00  Patient On (Oxygen Delivery Method): room air    REVIEW OF SYSTEMS:  CONSTITUTIONAL:  Patient denies fever, chills, night sweats.  HEAD:  No headaches.  EYES:  No double vision or blurry vision.  EARS:  No ringing in her ears.  NECK:  No neck pain.  CARDIOVASCULAR:  No chest pain.  RESPIRATORY:  No shortness of breath.  ABDOMEN:  No nausea, vomiting, or abdominal pain.  EXTREMITIES/NEUROLOGICAL:  No numbness and tingling.  MUSCULOSKELETAL:  No joint pain.  GENERAL:  Patient had been underneath a great deal of stress secondary to her son losing his job.    GENERAL EXAMINATION:    HEAD:  Normocephalic, atraumatic.  EYES:  No scleral icterus.  EARS:  Hearing bilaterally was intact.  NECK:  Supple.  CARDIOVASCULAR:  S1 and S2 heard.  RESPIRATORY:  Air entry bilaterally.  ABDOMEN:  Soft, nontender.  EXTREMITIES:  No clubbing or cyanosis were noted.      NEUROLOGIC:  Patient is awake, alert, oriented x3.  Able to say correct age and month.  Full visual fields.  Speech was fluent.  Smile symmetric.  No dysarthria was noted.  At times, patient states she does have problems trying to get the right words out, but did not notice any significant expressive aphasia, was able to repeat sentences, was able to name simple objects.  Motor, bilateral upper and lower 4+/5.  No drift of either upper or lower extremities.  Sensory, bilateral upper and lower intact to light touch.                  LABS:  CBC Full  -  ( 21 Mar 2024 05:28 )  WBC Count : 5.73 K/uL  RBC Count : 4.38 M/uL  Hemoglobin : 13.7 g/dL  Hematocrit : 41.4 %  Platelet Count - Automated : 308 K/uL  Mean Cell Volume : 94.5 fl  Mean Cell Hemoglobin : 31.3 pg  Mean Cell Hemoglobin Concentration : 33.1 gm/dL  Auto Neutrophil # : 3.27 K/uL  Auto Lymphocyte # : 1.36 K/uL  Auto Monocyte # : 0.87 K/uL  Auto Eosinophil # : 0.16 K/uL  Auto Basophil # : 0.06 K/uL  Auto Neutrophil % : 57.1 %  Auto Lymphocyte % : 23.7 %  Auto Monocyte % : 15.2 %  Auto Eosinophil % : 2.8 %  Auto Basophil % : 1.0 %    Urinalysis Basic - ( 21 Mar 2024 05:28 )    Color: x / Appearance: x / SG: x / pH: x  Gluc: 87 mg/dL / Ketone: x  / Bili: x / Urobili: x   Blood: x / Protein: x / Nitrite: x   Leuk Esterase: x / RBC: x / WBC x   Sq Epi: x / Non Sq Epi: x / Bacteria: x      03-21    148<H>  |  108  |  8   ----------------------------<  87  3.2<L>   |  34<H>  |  0.61    Ca    9.2      21 Mar 2024 05:28  Phos  3.5     03-21  Mg     2.0     03-21    TPro  6.5  /  Alb  3.2<L>  /  TBili  0.6  /  DBili  x   /  AST  34  /  ALT  39  /  AlkPhos  67  03-21    Hemoglobin A1C:     LIVER FUNCTIONS - ( 21 Mar 2024 05:28 )  Alb: 3.2 g/dL / Pro: 6.5 g/dL / ALK PHOS: 67 U/L / ALT: 39 U/L / AST: 34 U/L / GGT: x           Vitamin B12   PT/INR - ( 20 Mar 2024 16:05 )   PT: 11.1 sec;   INR: 0.95 ratio         PTT - ( 20 Mar 2024 16:05 )  PTT:28.3 sec      RADIOLOGY    < from: MR Head No Cont (03.21.24 @ 18:12) >    INTERPRETATION:  Clinical Indication: CEREBRAL INFARCTION, UNSPECIFIED  Comparison: 3/20/2024    Technique: Multiplanar MR imaging of the brain was obtained without   contrast. Time of flight MRA of the [place] was also obtained.    Findings:    Brain MRI:    There are small areas of acute diffusion in the left MCA territory   consistent with an acute infarct. There is no evidence of acute   intracranial hemorrhage. Ventricles and sulci are normal in size and   configuration for the patient's stated age. No midline shift or other   significant mass effect isnoted. There are patchy and confluent foci of   hyperintense T2 signal within the subcortical and periventricular white   matter which are nonspecific but likely related to chronic microvascular   ischemic disease.    The paranasal sinuses demonstrate no signal abnormality. The mastoids   demonstrate no signal abnormality. Bilateral orbits are within normal   limits.    Brain MRA:    Short segment occlusion of a left proximal M2 branch with distal   reconstitution. Asymmetrically diminished distal left MCA branches. There   is flow-related signal in the bilateral intradural internal carotid,   anterior cerebral and right middle cerebral arteries.    There is flow-related signal in the bilateral posterior cerebral,   basilar, and intradural vertebral arteries.    No evidence of aneurysm. Tiny aneurysms can be beyond the resolution of   MRA technique. No evidence of arteriovenous malformation.      IMPRESSION:    ACUTE LEFT MCA TERRITORY INFARCT. THERE IS NO EVIDENCE OF ACUTE   INTRACRANIALHEMORRHAGE.    SHORT SEGMENT OCCLUSION OF A LEFT PROXIMAL M2 BRANCH WITH DISTAL   RECONSTITUTION. ASYMMETRICALLY DIMINISHED DISTAL LEFT MCA BRANCHES.    ANALYSIS AND PLAN:  An 88-year-old with episode of dysarthria, expressive aphasia, and hemianopsia.    Clinical impression is cerebrovascular accident, possibly of the left MCA territory secondary to history of stenosis.  The patient is status post TNK.   aspirin 81 mg and Plavix 75 for 3 months.  After 3 months, we will discontinue Plavix.  We will recommend high-dose statin.  MRI/MRA imaging of the brain positive CVA  We will recommend echocardiogram.  For history of hypertension, monitor systolic blood pressure.  For history of hyperlipidemia, we will recommend high-dose statin.  Physical therapy evaluation.  Occupational therapy evaluation.  neurologic wise  stable dc planning     Attempted to contact the spouse, Dru, at [phone], alternate #[phone]. spoke to him today 3/22    52 minutes of time was spent with the patient, plan of care, reviewing data, speaking to multidisciplinary healthcare team, greater than 50% of time in counseling care and coordination.

## 2024-03-22 NOTE — PROGRESS NOTE ADULT - TIME BILLING
89 y/o F with PMHx HLD, htn  celiac disease and GERD presenting with slurred speech since around 1:30pm today. Admitted for CVA s/p TNK / stable possible dc plan . 87 y/o F with PMHx HLD, htn  celiac disease and GERD presenting with slurred speech since around 1:30pm today. Admitted for CVA s/p TNK / stable possible dc   echo - Small to moderate pericardial effusion identified anterior to the RA and RV, without evidence of elevated pericardial pressure / will repeat echo .

## 2024-03-23 ENCOUNTER — RESULT REVIEW (OUTPATIENT)
Age: 89
End: 2024-03-23

## 2024-03-23 LAB
ALBUMIN SERPL ELPH-MCNC: 3.1 G/DL — LOW (ref 3.3–5)
ALP SERPL-CCNC: 63 U/L — SIGNIFICANT CHANGE UP (ref 40–120)
ALT FLD-CCNC: 31 U/L — SIGNIFICANT CHANGE UP (ref 12–78)
ANION GAP SERPL CALC-SCNC: 5 MMOL/L — SIGNIFICANT CHANGE UP (ref 5–17)
AST SERPL-CCNC: 26 U/L — SIGNIFICANT CHANGE UP (ref 15–37)
BASOPHILS # BLD AUTO: 0.06 K/UL — SIGNIFICANT CHANGE UP (ref 0–0.2)
BASOPHILS NFR BLD AUTO: 0.9 % — SIGNIFICANT CHANGE UP (ref 0–2)
BILIRUB SERPL-MCNC: 0.6 MG/DL — SIGNIFICANT CHANGE UP (ref 0.2–1.2)
BUN SERPL-MCNC: 20 MG/DL — SIGNIFICANT CHANGE UP (ref 7–23)
CALCIUM SERPL-MCNC: 9.3 MG/DL — SIGNIFICANT CHANGE UP (ref 8.5–10.1)
CHLORIDE SERPL-SCNC: 109 MMOL/L — HIGH (ref 96–108)
CO2 SERPL-SCNC: 30 MMOL/L — SIGNIFICANT CHANGE UP (ref 22–31)
CREAT SERPL-MCNC: 0.7 MG/DL — SIGNIFICANT CHANGE UP (ref 0.5–1.3)
EGFR: 84 ML/MIN/1.73M2 — SIGNIFICANT CHANGE UP
EOSINOPHIL # BLD AUTO: 0.23 K/UL — SIGNIFICANT CHANGE UP (ref 0–0.5)
EOSINOPHIL NFR BLD AUTO: 3.6 % — SIGNIFICANT CHANGE UP (ref 0–6)
GLUCOSE SERPL-MCNC: 85 MG/DL — SIGNIFICANT CHANGE UP (ref 70–99)
HCT VFR BLD CALC: 43.2 % — SIGNIFICANT CHANGE UP (ref 34.5–45)
HGB BLD-MCNC: 14.7 G/DL — SIGNIFICANT CHANGE UP (ref 11.5–15.5)
IMM GRANULOCYTES NFR BLD AUTO: 0.3 % — SIGNIFICANT CHANGE UP (ref 0–0.9)
LYMPHOCYTES # BLD AUTO: 1.18 K/UL — SIGNIFICANT CHANGE UP (ref 1–3.3)
LYMPHOCYTES # BLD AUTO: 18.4 % — SIGNIFICANT CHANGE UP (ref 13–44)
MAGNESIUM SERPL-MCNC: 2.1 MG/DL — SIGNIFICANT CHANGE UP (ref 1.6–2.6)
MCHC RBC-ENTMCNC: 31.9 PG — SIGNIFICANT CHANGE UP (ref 27–34)
MCHC RBC-ENTMCNC: 34 GM/DL — SIGNIFICANT CHANGE UP (ref 32–36)
MCV RBC AUTO: 93.7 FL — SIGNIFICANT CHANGE UP (ref 80–100)
MONOCYTES # BLD AUTO: 1 K/UL — HIGH (ref 0–0.9)
MONOCYTES NFR BLD AUTO: 15.6 % — HIGH (ref 2–14)
NEUTROPHILS # BLD AUTO: 3.93 K/UL — SIGNIFICANT CHANGE UP (ref 1.8–7.4)
NEUTROPHILS NFR BLD AUTO: 61.2 % — SIGNIFICANT CHANGE UP (ref 43–77)
NRBC # BLD: 0 /100 WBCS — SIGNIFICANT CHANGE UP (ref 0–0)
PHOSPHATE SERPL-MCNC: 3.8 MG/DL — SIGNIFICANT CHANGE UP (ref 2.5–4.5)
PLATELET # BLD AUTO: 276 K/UL — SIGNIFICANT CHANGE UP (ref 150–400)
POTASSIUM SERPL-MCNC: 3.9 MMOL/L — SIGNIFICANT CHANGE UP (ref 3.5–5.3)
POTASSIUM SERPL-SCNC: 3.9 MMOL/L — SIGNIFICANT CHANGE UP (ref 3.5–5.3)
PROT SERPL-MCNC: 6.3 G/DL — SIGNIFICANT CHANGE UP (ref 6–8.3)
RBC # BLD: 4.61 M/UL — SIGNIFICANT CHANGE UP (ref 3.8–5.2)
RBC # FLD: 13.5 % — SIGNIFICANT CHANGE UP (ref 10.3–14.5)
SODIUM SERPL-SCNC: 144 MMOL/L — SIGNIFICANT CHANGE UP (ref 135–145)
WBC # BLD: 6.42 K/UL — SIGNIFICANT CHANGE UP (ref 3.8–10.5)
WBC # FLD AUTO: 6.42 K/UL — SIGNIFICANT CHANGE UP (ref 3.8–10.5)

## 2024-03-23 PROCEDURE — 93306 TTE W/DOPPLER COMPLETE: CPT | Mod: 26

## 2024-03-23 PROCEDURE — 99233 SBSQ HOSP IP/OBS HIGH 50: CPT

## 2024-03-23 PROCEDURE — 93308 TTE F-UP OR LMTD: CPT | Mod: 26,59

## 2024-03-23 PROCEDURE — 99233 SBSQ HOSP IP/OBS HIGH 50: CPT | Mod: GC

## 2024-03-23 RX ADMIN — PANTOPRAZOLE SODIUM 40 MILLIGRAM(S): 20 TABLET, DELAYED RELEASE ORAL at 05:36

## 2024-03-23 RX ADMIN — ENOXAPARIN SODIUM 40 MILLIGRAM(S): 100 INJECTION SUBCUTANEOUS at 22:07

## 2024-03-23 RX ADMIN — CLOPIDOGREL BISULFATE 75 MILLIGRAM(S): 75 TABLET, FILM COATED ORAL at 11:01

## 2024-03-23 RX ADMIN — CHLORHEXIDINE GLUCONATE 1 APPLICATION(S): 213 SOLUTION TOPICAL at 05:36

## 2024-03-23 RX ADMIN — Medication 81 MILLIGRAM(S): at 11:01

## 2024-03-23 RX ADMIN — ATORVASTATIN CALCIUM 80 MILLIGRAM(S): 80 TABLET, FILM COATED ORAL at 21:11

## 2024-03-23 NOTE — PROGRESS NOTE ADULT - PROBLEM SELECTOR PLAN 1
ICU s/p TNK @ 1700  - CT head: Short segment occlusion of a left proximal M2 branch   - Bedside dysphagia screen failed will keep NPO for now and request formal speech /swallow eval  later passed  - s/p Labetalol 10mg IV x1 in ED in order to meet BP threshold for TNK administration  - Allow permissive HTN for first 48 hours, will not treat unless SBP>180 or DBP>105  - Repeat CT 24 hrs after TNK or sooner if neurological status changes  - MRI brain -  ACUTE LEFT MCA TERRITORY INFARCT. and TTE w/ bubble study see above result. -5. Small to moderate pericardial effusion identified anterior to the RA and RV, without evidence of elevated pericardial pressure.  -  A1c 5.5, lipid panel ldl 75 ,   - Aspiration precautions  - Neuro checks Q1H  - on asa / plavix   high dose statin   - Neuro consulted Dr. Davidson,   - Cardio consulted dr april grossman   - PT/OT eval-home pt  -

## 2024-03-23 NOTE — PROGRESS NOTE ADULT - ASSESSMENT
88 year-old F with PMH  of supraventricular ectopy, palpitations, dyspnea, mild aortic insufficiency, mild carotid atherosclerosis, HLD, celiac disease, a hepatic cyst, a R kidney cyst, a cystocele, left thyroid nodules, osteoporosis, urinary stress incontinence (pessary) presents with slurred speech, now s/p TNK    Acute Stroke, HLD   - CTA Brain showed core infarct and ischemic penumbra in the left MCA territory: CBF<30% volume: 3 ml. Left MCA territory, suggesting core infarct.  MRI head showed acute left MCA infarct  - S/P TNK in ER.   - Neurology following.  Continue DAPT and high-intensity statin  - Monitor in tele for occult AF.  Maintaining NSR, thus far   - TTE showed normal LV/RVF with no significant valvular disease with small to moderate pericardial effusion.   - Repeat limited TTE today, 3/23, for re-assessment of pericardial effusion    - -130s  - On prn Hydralazine IV    - EKG showed SR @ 64, LVH  - Mild troponin leak noted, though no suggestion of ACS  - No need to trend further    - No evidence of any meaningful volume overload   - Monitor and replete lytes, keep K>4, Mg>2.  - Will continue to follow.    Zita Monteiro DNP, NP-C, AGACNP-C  Cardiology   Call TEAMS          88 year-old F with PMH  of supraventricular ectopy, palpitations, dyspnea, mild aortic insufficiency, mild carotid atherosclerosis, HLD, celiac disease, a hepatic cyst, a R kidney cyst, a cystocele, left thyroid nodules, osteoporosis, urinary stress incontinence (pessary) presents with slurred speech, now s/p TNK    Acute Stroke, HLD   - CTA Brain showed core infarct and ischemic penumbra in the left MCA territory: CBF<30% volume: 3 ml. Left MCA territory, suggesting core infarct.  MRI head showed acute left MCA infarct  - S/P TNK in ER.   - Neurology following.  Continue DAPT and high-intensity statin  - Monitor in tele for occult AF.  Maintaining NSR, thus far.  Will need outpatient ILR.  Instructed to f/u with us   - TTE showed normal LV/RVF with no significant valvular disease with small to moderate pericardial effusion.   - Repeat limited TTE today, 3/23, for re-assessment of pericardial effusion    - -130s  - On prn Hydralazine IV    - EKG showed SR @ 64, LVH  - Mild troponin leak noted, though no suggestion of ACS  - No need to trend further    - No evidence of any meaningful volume overload   - Monitor and replete lytes, keep K>4, Mg>2.  - Will continue to follow.    Zita Monteiro DNP, NP-C, AGACNP-C  Cardiology   Call TEAMS

## 2024-03-23 NOTE — PROGRESS NOTE ADULT - PROBLEM SELECTOR PLAN 2
-Trop 402 on admission likely in setting of demand ischemia  - trend to peak  -plan per icu
-Trop 402 on admission likely in setting of demand ischemia.
-Trop 402 on admission likely in setting of demand ischemia  - trend to peak  -plan per icu

## 2024-03-23 NOTE — PROGRESS NOTE ADULT - TIME BILLING
89 y/o F with PMHx HLD, htn  celiac disease and GERD presenting with slurred speech since around 1:30pm today. Admitted for CVA s/p TNK ,    echo - Small to moderate pericardial effusion identified anterior to the RA and RV, without evidence of elevated pericardial pressure  hemodynamically stable  / will need to repeat echo  prior dc  as per cardio  dr grossman pending  .

## 2024-03-23 NOTE — PROGRESS NOTE ADULT - NS ATTEND AMEND GEN_ALL_CORE FT
88 year-old F with PMH  of supraventricular ectopy, palpitations, dyspnea, mild aortic insufficiency, mild carotid atherosclerosis, HLD, celiac disease, a hepatic cyst, a R kidney cyst, a cystocele, left thyroid nodules, osteoporosis, urinary stress incontinence (pessary) presents with slurred speech, now s/p TNK    - Continue tele monitoring for occult AF  - TTE 3/21/24 with normal LV function, probably normal RV function, RAP of 3, small to moderate pericardial effusion.   - Repeat TTE pending today to ensure effusion remains stable prior to discharge  - On DAPT, statin  - BP well controlled today

## 2024-03-23 NOTE — PROGRESS NOTE ADULT - PROBLEM SELECTOR PLAN 6
-chronic -- gluten free diet when able to tolerate po

## 2024-03-23 NOTE — PROGRESS NOTE ADULT - SUBJECTIVE AND OBJECTIVE BOX
[facility] -- Tori Vila, Marisol, Shannon Villela, , Carlos Bautista  Office # [phone]    Follow Up:  Acute CVA s/p TNK    Subjective/Observations:     REVIEW OF SYSTEMS: All other review of systems is negative unless indicated above  PAST MEDICAL & SURGICAL HISTORY:  Arthritis  Osteoporosis  Prolapsed uterus  Celiac sprue  Elective surgery  pessary placed  as outpatient  2016  H/O major orthopedic surgery  kell placed due to fx    MEDICATIONS  (STANDING):  aspirin enteric coated 81 milliGRAM(s) Oral daily  atorvastatin 80 milliGRAM(s) Oral at bedtime  chlorhexidine 2% Cloths 1 Application(s) Topical <User Schedule>  clopidogrel Tablet 75 milliGRAM(s) Oral daily  enoxaparin Injectable 40 milliGRAM(s) SubCutaneous every 24 hours  pantoprazole    Tablet 40 milliGRAM(s) Oral before breakfast    MEDICATIONS  (PRN):  acetaminophen     Tablet .. 650 milliGRAM(s) Oral every 6 hours PRN Mild Pain (1 - 3)  hydrALAZINE Injectable 10 milliGRAM(s) IV Push every 6 hours PRN BP control    Allergies    No Known Drug Allergies  Gluten (Flatulence; Other; Diarrhea)    Intolerances      Vital Signs Last 24 Hrs  T(C): 36.4 (23 Mar 2024 05:34), Max: 36.7 (22 Mar 2024 12:00)  T(F): 97.6 (23 Mar 2024 05:34), Max: 98 (22 Mar 2024 12:00)  HR: 69 (23 Mar 2024 05:34) (63 - 83)  BP: 115/68 (23 Mar 2024 05:34) (95/51 - 134/81)  BP(mean): 90 (22 Mar 2024 17:00) (69 - 90)  RR: 17 (23 Mar 2024 05:34) (16 - 28)  SpO2: 95% (23 Mar 2024 05:34) (94% - 98%)    Parameters below as of 23 Mar 2024 05:34  Patient On (Oxygen Delivery Method): room air      I&O's Summary    21 Mar 2024 07:01  -  22 Mar 2024 07:00  --------------------------------------------------------  IN: 840 mL / OUT: 1700 mL / NET: -860 mL    22 Mar 2024 07:01  -  23 Mar 2024 06:07  --------------------------------------------------------  IN: 620 mL / OUT: 300 mL / NET: 320 mL        PHYSICAL EXAM:  TELE:   Constitutional: NAD, awake and alert, well-developed  HEENT: Moist Mucous Membranes, Anicteric  Pulmonary: Non-labored, breath sounds are clear bilaterally, No wheezing, rales or rhonchi  Cardiovascular: Regular, S1 and S2, No murmurs, rubs, gallops or clicks  Gastrointestinal: Bowel Sounds present, soft, nontender.   Lymph: No peripheral edema. No lymphadenopathy.  Skin: No visible rashes or ulcers.  Psych:  Mood & affect appropriate  LABS: All Labs Reviewed:                        14.5   7.08  )-----------( 294      ( 22 Mar 2024 09:10 )             43.2                         13.7   5.73  )-----------( 308      ( 21 Mar 2024 05:28 )             41.4                         14.2   6.64  )-----------( 323      ( 20 Mar 2024 16:05 )             43.5     22 Mar 2024 09:10    143    |  107    |  13     ----------------------------<  148    3.9     |  26     |  0.93   21 Mar 2024 05:28    148    |  108    |  8      ----------------------------<  87     3.2     |  34     |  0.61   20 Mar 2024 16:05    144    |  106    |  12     ----------------------------<  117    3.3     |  32     |  0.76     Ca    9.3        22 Mar 2024 09:10  Ca    9.2        21 Mar 2024 05:28  Ca    9.4        20 Mar 2024 16:05  Phos  3.1       22 Mar 2024 09:10  Phos  3.5       21 Mar 2024 05:28  Mg     2.1       22 Mar 2024 09:10  Mg     2.0       21 Mar 2024 05:28    TPro  6.2    /  Alb  3.2    /  TBili  0.6    /  DBili  x      /  AST  31     /  ALT  31     /  AlkPhos  67     22 Mar 2024 09:10  TPro  6.5    /  Alb  3.2    /  TBili  0.6    /  DBili  x      /  AST  34     /  ALT  39     /  AlkPhos  67     21 Mar 2024 05:28  TPro  7.0    /  Alb  3.6    /  TBili  0.5    /  DBili  x      /  AST  31     /  ALT  30     /  AlkPhos  72     20 Mar 2024 16:05          12 Lead ECG:   Ventricular Rate 64 BPM    Atrial Rate 64 BPM    P-R Interval 152 ms    QRS Duration 74 ms    Q-T Interval 412 ms    QTC Calculation(Bazett) 425 ms    P Axis 66 degreesR Axis 66 degrees    T Axis 22 degrees    Diagnosis Line Normal sinus rhythm  Possible Left atrial enlargement  Left ventricular hypertrophy ( Sokolow-Crawford , Romhilt-Ruvalcaba )  Nonspecific ST abnormality  Abnormal ECG  Confirmed by darrian Ortega ([id number]) on 3/21/2024 1:30:01 PM (03-20-24 @ 15:59)  TRANSTHORACIC ECHOCARDIOGRAM REPORT  ________________________________________________________________________________                                      _______  Pt. Name:       ALPA BHATIA Study Date:    3/21/2024  MRN:            [id number]         YOB: 1935  Accession #:    [id number]        Age:           88 years  Account#:       [id number]       Gender:        F  Heart Rate:                      Height:        58.66 in (149.00 cm)  Rhythm:                          Weight:        103.62 lb (47.00 kg)  Blood Pressure: 185/78 mmHg      BSA/BMI:       1.39 m² / 21.17 kg/m²  ________________________________________________________________________________________  Referring Physician:    Prabhjot Castro  Interpreting Physician: Ever Valle  Primary Sonographer:    Marcela Patino [facility]    CPT:               ECHO TTE WO CON COMP W DOPP - 54546.m  Indication(s):     Cerebral infarction due to unspecified occlusion or stenosis                     of unspecified cerebral artery - I63.50  Procedure:         Transthoracic echocardiogram with 2-D, M-mode and complete                     spectral and color flow Doppler.  Ordering Location: [facility]  Admission Status:  Inpatient  Study Information: Image quality for this study is technically difficult.  _______________________________________________________________________________________     CONCLUSIONS:      1. Technically difficult image quality.   2. Left ventricular cavity is normal in size.   3. Left ventricular endocardium is not well visualized; however, the left ventricular systolic function appears grossly normal.   4. The right ventricle is not well visualized. probably normal systolic function.   5. The left atrium is normal.   6. The right atrium isnormal in size.   7. Aortic valve was not well visualized.   8. There is mild calcification of the aortic valve leaflets.   9. Mild aortic regurgitation.  10. Structurally normal pulmonic valve with normal leaflet excursion.  11. There is mild calcification of the mitral valve annulus.  12. Structurally normal tricuspid valve with normal leaflet excursion. Mild to moderate tricuspid regurgitation.  13. Estimated pulmonary artery systolic pressure is 28 mmHg, consistent with normal pulmonary artery pressure.  14. The inferior vena cava is normal in size (normal <2.1cm) with normal inspiratory collapse (normal >50%) consistent with normal right atrial pressure (~3, range 0-5mmHg).  15. Small to moderate pericardial effusion identified anterior to the RA and RV, without evidence of elevated pericardial pressure.    ________________________________________________________________________________________  FINDINGS:     Left Ventricle:  The left ventricular cavity is normal in size. Left ventricular endocardium is not well visualized; however, the left ventricular systolic function appears grossly normal.     Right Ventricle:  The right ventricle is not well visualized. Probably normal systolic function.     Left Atrium:  The left atrium isnormal.     Right Atrium:  The right atrium is normal in size.     Aortic Valve:  The aortic valve was not well visualized. The aortic valve anatomy cannot be determined. There is mild calcification of the aortic valve leaflets. There is mild aortic regurgitation.     Mitral Valve:  There is mild calcification of the mitral valve annulus.     Tricuspid Valve:  Structurally normal tricuspid valve with normal leaflet excursion. There is mild to moderate tricuspid regurgitation. Estimated pulmonary artery systolic pressure is 28 mmHg, consistent with normal pulmonary artery pressure.     Pulmonic Valve:  Structurally normal pulmonic valve with normal leaflet excursion.     Pericardium:  Small to moderate pericardial effusion identified anterior to the RA and RV, without evidence of elevated pericardial pressure.     Systemic Veins:  The inferior vena cava is normal in size (normal <2.1cm) with normal inspiratory collapse (normal >50%) consistent with normal right atrial pressure (~3, range 0-5mmHg).  ____________________________________________________________________  QUANTITATIVE DATA:  Left Ventricle Measurements: (Indexed to BSA)     IVSd (2D):   0.7 cm  LVPWd (2D):  0.9 cm  LVIDd (2D):  3.5 cm  LVIDs (2D):  2.4 cm  LV Mass:     77 g  55.7 g/m²     MV E Vmax:    0.68 m/s  MV A Vmax:    0.81 m/s  MV E/A:       0.84  e' lateral:   6.64 cm/s  e' medial:    5.55 cm/s  E/e' lateral: 10.18  E/e' medial:  12.18  E/e' Average: 11.09  MV DT:        239 msec    Aorta Measurements: (Normalrange) (Indexed to BSA)     Sinuses of Valsalva: 2.40 cm (2.7 - 3.3 cm)    Left Atrium Measurements: (Indexed to BSA)  LA Diam 2D: 2.40 cm    Mitral Valve Measurements:     MV E Vmax: 0.7 m/s  MV A Vmax: 0.8 m/s  MV E/A:    0.8       Tricuspid Valve Measurements:     TR Vmax:          2.5 m/s  TR Peak Gradient: 24.8 mmHg  RA Pressure:      3 mmHg  PASP:             28 mmHg  TR VTI            0.72 m    ________________________________________________________________________________________  Electronically signed on 3/21/2024 at 5:44:18 PM by Ever Valle         *** Final ***      [facility] -- Tori Vila, Marisol, Shannon Villela, , Carlos Bautista  Office # [phone]    Follow Up:  Acute CVA s/p TNK    Subjective/Observations: Denies any neuro symptoms.  Speech clear.  Comfortable on RA.  Denies any form of respiratory or cardiac discomfort.  No tele events    REVIEW OF SYSTEMS: All other review of systems is negative unless indicated above  PAST MEDICAL & SURGICAL HISTORY:  Arthritis  Osteoporosis  Prolapsed uterus  Celiac sprue  Elective surgery  pessary placed  as outpatient  2016  H/O major orthopedic surgery  kell placed due to fx    MEDICATIONS  (STANDING):  aspirin enteric coated 81 milliGRAM(s) Oral daily  atorvastatin 80 milliGRAM(s) Oral at bedtime  chlorhexidine 2% Cloths 1 Application(s) Topical <User Schedule>  clopidogrel Tablet 75 milliGRAM(s) Oral daily  enoxaparin Injectable 40 milliGRAM(s) SubCutaneous every 24 hours  pantoprazole    Tablet 40 milliGRAM(s) Oral before breakfast    MEDICATIONS  (PRN):  acetaminophen     Tablet .. 650 milliGRAM(s) Oral every 6 hours PRN Mild Pain (1 - 3)  hydrALAZINE Injectable 10 milliGRAM(s) IV Push every 6 hours PRN BP control    Allergies    No Known Drug Allergies  Gluten (Flatulence; Other; Diarrhea)    Intolerances    Vital Signs Last 24 Hrs  T(C): 36.4 (23 Mar 2024 05:34), Max: 36.7 (22 Mar 2024 12:00)  T(F): 97.6 (23 Mar 2024 05:34), Max: 98 (22 Mar 2024 12:00)  HR: 69 (23 Mar 2024 05:34) (63 - 83)  BP: 115/68 (23 Mar 2024 05:34) (95/51 - 134/81)  BP(mean): 90 (22 Mar 2024 17:00) (69 - 90)  RR: 17 (23 Mar 2024 05:34) (16 - 28)  SpO2: 95% (23 Mar 2024 05:34) (94% - 98%)    Parameters below as of 23 Mar 2024 05:34  Patient On (Oxygen Delivery Method): room air    I&O's Summary    21 Mar 2024 07:01  -  22 Mar 2024 07:00  --------------------------------------------------------  IN: 840 mL / OUT: 1700 mL / NET: -860 mL    22 Mar 2024 07:01  -  23 Mar 2024 06:07  --------------------------------------------------------  IN: 620 mL / OUT: 300 mL / NET: 320 mL    PHYSICAL EXAM:  TELE: NSR  Constitutional: NAD, awake and alert, well-developed  HEENT: Moist Mucous Membranes, Anicteric  Pulmonary: Non-labored, breath sounds are clear bilaterally, No wheezing, rales or rhonchi  Cardiovascular: Regular, S1 and S2, No murmurs, rubs, gallops or clicks  Gastrointestinal: Bowel Sounds present, soft, nontender.   Lymph: No peripheral edema. No lymphadenopathy.  Skin: No visible rashes or ulcers.  Psych:  Mood & affect appropriate  LABS: All Labs Reviewed:                        14.5   7.08  )-----------( 294      ( 22 Mar 2024 09:10 )             43.2                         13.7   5.73  )-----------( 308      ( 21 Mar 2024 05:28 )             41.4                         14.2   6.64  )-----------( 323      ( 20 Mar 2024 16:05 )             43.5     22 Mar 2024 09:10    143    |  107    |  13     ----------------------------<  148    3.9     |  26     |  0.93   21 Mar 2024 05:28    148    |  108    |  8      ----------------------------<  87     3.2     |  34     |  0.61   20 Mar 2024 16:05    144    |  106    |  12     ----------------------------<  117    3.3     |  32     |  0.76     Ca    9.3        22 Mar 2024 09:10  Ca    9.2        21 Mar 2024 05:28  Ca    9.4        20 Mar 2024 16:05  Phos  3.1       22 Mar 2024 09:10  Phos  3.5       21 Mar 2024 05:28  Mg     2.1       22 Mar 2024 09:10  Mg     2.0       21 Mar 2024 05:28    TPro  6.2    /  Alb  3.2    /  TBili  0.6    /  DBili  x      /  AST  31     /  ALT  31     /  AlkPhos  67     22 Mar 2024 09:10  TPro  6.5    /  Alb  3.2    /  TBili  0.6    /  DBili  x      /  AST  34     /  ALT  39     /  AlkPhos  67     21 Mar 2024 05:28  TPro  7.0    /  Alb  3.6    /  TBili  0.5    /  DBili  x      /  AST  31     /  ALT  30     /  AlkPhos  72     20 Mar 2024 16:05          12 Lead ECG:   Ventricular Rate 64 BPM    Atrial Rate 64 BPM    P-R Interval 152 ms    QRS Duration 74 ms    Q-T Interval 412 ms    QTC Calculation(Bazett) 425 ms    P Axis 66 degreesR Axis 66 degrees    T Axis 22 degrees    Diagnosis Line Normal sinus rhythm  Possible Left atrial enlargement  Left ventricular hypertrophy ( Sokolow-Crawford , Romhilt-Ruvalcaba )  Nonspecific ST abnormality  Abnormal ECG  Confirmed by darrian Ortega ([id number]) on 3/21/2024 1:30:01 PM (03-20-24 @ 15:59)  TRANSTHORACIC ECHOCARDIOGRAM REPORT  ________________________________________________________________________________                                      _______  Pt. Name:       ALPA BHATIA Study Date:    3/21/2024  MRN:            [id number]         YOB: 1935  Accession #:    [id number]        Age:           88 years  Account#:       [id number]       Gender:        F  Heart Rate:                      Height:        58.66 in (149.00 cm)  Rhythm:                          Weight:        103.62 lb (47.00 kg)  Blood Pressure: 185/78 mmHg      BSA/BMI:       1.39 m² / 21.17 kg/m²  ________________________________________________________________________________________  Referring Physician:    Prabhjot Castro  Interpreting Physician: Ever Valle  Primary Sonographer:    Marcela Patino [facility]    CPT:               ECHO TTE WO CON COMP W DOPP - 77803.m  Indication(s):     Cerebral infarction due to unspecified occlusion or stenosis                     of unspecified cerebral artery - I63.50  Procedure:         Transthoracic echocardiogram with 2-D, M-mode and complete                     spectral and color flow Doppler.  Ordering Location: [facility]  Admission Status:  Inpatient  Study Information: Image quality for this study is technically difficult.  _______________________________________________________________________________________     CONCLUSIONS:      1. Technically difficult image quality.   2. Left ventricular cavity is normal in size.   3. Left ventricular endocardium is not well visualized; however, the left ventricular systolic function appears grossly normal.   4. The right ventricle is not well visualized. probably normal systolic function.   5. The left atrium is normal.   6. The right atrium is normal in size.   7. Aortic valve was not well visualized.   8. There is mild calcification of the aortic valve leaflets.   9. Mild aortic regurgitation.  10. Structurally normal pulmonic valve with normal leaflet excursion.  11. There is mild calcification of the mitral valve annulus.  12. Structurally normal tricuspid valve with normal leaflet excursion. Mild to moderate tricuspid regurgitation.  13. Estimated pulmonary artery systolic pressure is 28 mmHg, consistent with normal pulmonary artery pressure.  14. The inferior vena cava is normal in size (normal <2.1cm) with normal inspiratory collapse (normal >50%) consistent with normal right atrial pressure (~3, range 0-5mmHg).  15. Small to moderate pericardial effusion identified anterior to the RA and RV, without evidence of elevated pericardial pressure.    ________________________________________________________________________________________  FINDINGS:     Left Ventricle:  The left ventricular cavity is normal in size. Left ventricular endocardium is not well visualized; however, the left ventricular systolic function appears grossly normal.     Right Ventricle:  The right ventricle is not well visualized. Probably normal systolic function.     Left Atrium:  The left atrium isnormal.     Right Atrium:  The right atrium is normal in size.     Aortic Valve:  The aortic valve was not well visualized. The aortic valve anatomy cannot be determined. There is mild calcification of the aortic valve leaflets. There is mild aortic regurgitation.     Mitral Valve:  There is mild calcification of the mitral valve annulus.     Tricuspid Valve:  Structurally normal tricuspid valve with normal leaflet excursion. There is mild to moderate tricuspid regurgitation. Estimated pulmonary artery systolic pressure is 28 mmHg, consistent with normal pulmonary artery pressure.     Pulmonic Valve:  Structurally normal pulmonic valve with normal leaflet excursion.     Pericardium:  Small to moderate pericardial effusion identified anterior to the RA and RV, without evidence of elevated pericardial pressure.     Systemic Veins:  The inferior vena cava is normal in size (normal <2.1cm) with normal inspiratory collapse (normal >50%) consistent with normal right atrial pressure (~3, range 0-5mmHg).  ____________________________________________________________________  QUANTITATIVE DATA:  Left Ventricle Measurements: (Indexed to BSA)     IVSd (2D):   0.7 cm  LVPWd (2D):  0.9 cm  LVIDd (2D):  3.5 cm  LVIDs (2D):  2.4 cm  LV Mass:     77 g  55.7 g/m²     MV E Vmax:    0.68 m/s  MV A Vmax:    0.81 m/s  MV E/A:       0.84  e' lateral:   6.64 cm/s  e' medial:    5.55 cm/s  E/e' lateral: 10.18  E/e' medial:  12.18  E/e' Average: 11.09  MV DT:        239 msec    Aorta Measurements: (Normalrange) (Indexed to BSA)     Sinuses of Valsalva: 2.40 cm (2.7 - 3.3 cm)    Left Atrium Measurements: (Indexed to BSA)  LA Diam 2D: 2.40 cm    Mitral Valve Measurements:     MV E Vmax: 0.7 m/s  MV A Vmax: 0.8 m/s  MV E/A:    0.8       Tricuspid Valve Measurements:     TR Vmax:          2.5 m/s  TR Peak Gradient: 24.8 mmHg  RA Pressure:      3 mmHg  PASP:             28 mmHg  TR VTI            0.72 m    ________________________________________________________________________________________  Electronically signed on 3/21/2024 at 5:44:18 PM by Ever Valle         *** Final ***      [facility] -- Tori Vila, Marisol, Shannon Villela, , Carlos Bautista  Office # [phone]    Follow Up:  Acute CVA s/p TNK    Subjective/Observations: Denies any neuro symptoms.  Speech clear.  Comfortable on RA.  Denies any form of respiratory or cardiac discomfort.  No tele events    REVIEW OF SYSTEMS: All other review of systems is negative unless indicated above  PAST MEDICAL & SURGICAL HISTORY:  Arthritis  Osteoporosis  Prolapsed uterus  Celiac sprue  Elective surgery  pessary placed  as outpatient  2016  H/O major orthopedic surgery  kell placed due to fx    MEDICATIONS  (STANDING):  aspirin enteric coated 81 milliGRAM(s) Oral daily  atorvastatin 80 milliGRAM(s) Oral at bedtime  chlorhexidine 2% Cloths 1 Application(s) Topical <User Schedule>  clopidogrel Tablet 75 milliGRAM(s) Oral daily  enoxaparin Injectable 40 milliGRAM(s) SubCutaneous every 24 hours  pantoprazole    Tablet 40 milliGRAM(s) Oral before breakfast    MEDICATIONS  (PRN):  acetaminophen     Tablet .. 650 milliGRAM(s) Oral every 6 hours PRN Mild Pain (1 - 3)  hydrALAZINE Injectable 10 milliGRAM(s) IV Push every 6 hours PRN BP control    Allergies    No Known Drug Allergies  Gluten (Flatulence; Other; Diarrhea)    Intolerances    Vital Signs Last 24 Hrs  T(C): 36.4 (23 Mar 2024 05:34), Max: 36.7 (22 Mar 2024 12:00)  T(F): 97.6 (23 Mar 2024 05:34), Max: 98 (22 Mar 2024 12:00)  HR: 69 (23 Mar 2024 05:34) (63 - 83)  BP: 115/68 (23 Mar 2024 05:34) (95/51 - 134/81)  BP(mean): 90 (22 Mar 2024 17:00) (69 - 90)  RR: 17 (23 Mar 2024 05:34) (16 - 28)  SpO2: 95% (23 Mar 2024 05:34) (94% - 98%)    Parameters below as of 23 Mar 2024 05:34  Patient On (Oxygen Delivery Method): room air    I&O's Summary    21 Mar 2024 07:01  -  22 Mar 2024 07:00  --------------------------------------------------------  IN: 840 mL / OUT: 1700 mL / NET: -860 mL    22 Mar 2024 07:01  -  23 Mar 2024 06:07  --------------------------------------------------------  IN: 620 mL / OUT: 300 mL / NET: 320 mL    PHYSICAL EXAM:  TELE: NSR  Constitutional: NAD, awake and alert, well-developed  HEENT: Moist Mucous Membranes, Anicteric  Pulmonary: Non-labored, breath sounds are clear bilaterally, No wheezing, rales or rhonchi  Cardiovascular: Regular, S1 and S2, No murmurs, rubs, gallops or clicks  Gastrointestinal: Bowel Sounds present, soft, nontender.   Lymph: No peripheral edema. No lymphadenopathy.  Skin: No visible rashes or ulcers.  Psych:  Mood & affect appropriate  LABS: All Labs Reviewed:                        14.5   7.08  )-----------( 294      ( 22 Mar 2024 09:10 )             43.2                         13.7   5.73  )-----------( 308      ( 21 Mar 2024 05:28 )             41.4                         14.2   6.64  )-----------( 323      ( 20 Mar 2024 16:05 )             43.5     22 Mar 2024 09:10    143    |  107    |  13     ----------------------------<  148    3.9     |  26     |  0.93   21 Mar 2024 05:28    148    |  108    |  8      ----------------------------<  87     3.2     |  34     |  0.61   20 Mar 2024 16:05    144    |  106    |  12     ----------------------------<  117    3.3     |  32     |  0.76     Ca    9.3        22 Mar 2024 09:10  Ca    9.2        21 Mar 2024 05:28  Ca    9.4        20 Mar 2024 16:05  Phos  3.1       22 Mar 2024 09:10  Phos  3.5       21 Mar 2024 05:28  Mg     2.1       22 Mar 2024 09:10  Mg     2.0       21 Mar 2024 05:28    TPro  6.2    /  Alb  3.2    /  TBili  0.6    /  DBili  x      /  AST  31     /  ALT  31     /  AlkPhos  67     22 Mar 2024 09:10  TPro  6.5    /  Alb  3.2    /  TBili  0.6    /  DBili  x      /  AST  34     /  ALT  39     /  AlkPhos  67     21 Mar 2024 05:28  TPro  7.0    /  Alb  3.6    /  TBili  0.5    /  DBili  x      /  AST  31     /  ALT  30     /  AlkPhos  72     20 Mar 2024 16:05          12 Lead ECG:   Ventricular Rate 64 BPM    Atrial Rate 64 BPM    P-R Interval 152 ms    QRS Duration 74 ms    Q-T Interval 412 ms    QTC Calculation(Bazett) 425 ms    P Axis 66 degreesR Axis 66 degrees    T Axis 22 degrees    Diagnosis Line Normal sinus rhythm  Possible Left atrial enlargement  Left ventricular hypertrophy ( Sokolow-Crawford , Romhilt-Ruvalcaba )  Nonspecific ST abnormality  Abnormal ECG  Confirmed by darrian Ortega ([id number]) on 3/21/2024 1:30:01 PM (03-20-24 @ 15:59)  TRANSTHORACIC ECHOCARDIOGRAM REPORT  ________________________________________________________________________________                                      _______  Pt. Name:       ALPA BHATIA Study Date:    3/21/2024  MRN:            [id number]         YOB: 1935  Accession #:    [id number]        Age:           88 years  Account#:       [id number]       Gender:        F  Heart Rate:                      Height:        58.66 in (149.00 cm)  Rhythm:                          Weight:        103.62 lb (47.00 kg)  Blood Pressure: 185/78 mmHg      BSA/BMI:       1.39 m² / 21.17 kg/m²  ________________________________________________________________________________________  Referring Physician:    Prabhjot Castro  Interpreting Physician: Ever Valle  Primary Sonographer:    Marcela Patino [facility]    CPT:               ECHO TTE WO CON COMP W DOPP - 02269.m  Indication(s):     Cerebral infarction due to unspecified occlusion or stenosis                     of unspecified cerebral artery - I63.50  Procedure:         Transthoracic echocardiogram with 2-D, M-mode and complete                     spectral and color flow Doppler.  Ordering Location: [facility]  Admission Status:  Inpatient  Study Information: Image quality for this study is technically difficult.  _______________________________________________________________________________________     CONCLUSIONS:      1. Technically difficult image quality.   2. Left ventricular cavity is normal in size.   3. Left ventricular endocardium is not well visualized; however, the left ventricular systolic function appears grossly normal.   4. The right ventricle is not well visualized. probably normal systolic function.   5. The left atrium is normal.   6. The right atrium is normal in size.   7. Aortic valve was not well visualized.   8. There is mild calcification of the aortic valve leaflets.   9. Mild aortic regurgitation.  10. Structurally normal pulmonic valve with normal leaflet excursion.  11. There is mild calcification of the mitral valve annulus.  12. Structurally normal tricuspid valve with normal leaflet excursion. Mild to moderate tricuspid regurgitation.  13. Estimated pulmonary artery systolic pressure is 28 mmHg, consistent with normal pulmonary artery pressure.  14. The inferior vena cava is normal in size (normal <2.1cm) with normal inspiratory collapse (normal >50%) consistent with normal right atrial pressure (~3, range 0-5mmHg).  15. Small to moderate pericardial effusion identified anterior to the RA and RV, without evidence of elevated pericardial pressure.    ________________________________________________________________________________________  FINDINGS:     Left Ventricle:  The left ventricular cavity is normal in size. Left ventricular endocardium is not well visualized; however, the left ventricular systolic function appears grossly normal.     Right Ventricle:  The right ventricle is not well visualized. Probably normal systolic function.     Left Atrium:  The left atrium isnormal.     Right Atrium:  The right atrium is normal in size.     Aortic Valve:  The aortic valve was not well visualized. The aortic valve anatomy cannot be determined. There is mild calcification of the aortic valve leaflets. There is mild aortic regurgitation.     Mitral Valve:  There is mild calcification of the mitral valve annulus.     Tricuspid Valve:  Structurally normal tricuspid valve with normal leaflet excursion. There is mild to moderate tricuspid regurgitation. Estimated pulmonary artery systolic pressure is 28 mmHg, consistent with normal pulmonary artery pressure.     Pulmonic Valve:  Structurally normal pulmonic valve with normal leaflet excursion.     Pericardium:  Small to moderate pericardial effusion identified anterior to the RA and RV, without evidence of elevated pericardial pressure.     Systemic Veins:  The inferior vena cava is normal in size (normal <2.1cm) with normal inspiratory collapse (normal >50%) consistent with normal right atrial pressure (~3, range 0-5mmHg).  ____________________________________________________________________  QUANTITATIVE DATA:  Left Ventricle Measurements: (Indexed to BSA)     IVSd (2D):   0.7 cm  LVPWd (2D):  0.9 cm  LVIDd (2D):  3.5 cm  LVIDs (2D):  2.4 cm  LV Mass:     77 g  55.7 g/m²     MV E Vmax:    0.68 m/s  MV A Vmax:    0.81 m/s  MV E/A:       0.84  e' lateral:   6.64 cm/s  e' medial:    5.55 cm/s  E/e' lateral: 10.18  E/e' medial:  12.18  E/e' Average: 11.09  MV DT:        239 msec    Aorta Measurements: (Normalrange) (Indexed to BSA)     Sinuses of Valsalva: 2.40 cm (2.7 - 3.3 cm)    Left Atrium Measurements: (Indexed to BSA)  LA Diam 2D: 2.40 cm    Mitral Valve Measurements:     MV E Vmax: 0.7 m/s  MV A Vmax: 0.8 m/s  MV E/A:    0.8       Tricuspid Valve Measurements:     TR Vmax:          2.5 m/s  TR Peak Gradient: 24.8 mmHg  RA Pressure:      3 mmHg  PASP:             28 mmHg  TR VTI            0.72 m    ________________________________________________________________________________________  Electronically signed on 3/21/2024 at 5:44:18 PM by Ever Valle         *** Final ***

## 2024-03-23 NOTE — PROGRESS NOTE ADULT - SUBJECTIVE AND OBJECTIVE BOX
Patient is a 88y old  Female who presents with a chief complaint of CVA (23 Mar 2024 06:07)    pt seen and examine today denies any c/o  slurred  speech resolved , no fever , tolerating po.   INTERVAL HPI/OVERNIGHT EVENTS:     T(C): 36.4 (03-23-24 @ 05:34), Max: 36.7 (03-22-24 @ 16:00)  HR: 69 (03-23-24 @ 05:34) (63 - 74)  BP: 115/68 (03-23-24 @ 05:34) (106/52 - 134/81)  RR: 17 (03-23-24 @ 05:34) (16 - 20)  SpO2: 95% (03-23-24 @ 05:34) (94% - 98%)  Wt(kg): --  I&O's Summary    22 Mar 2024 07:01  -  23 Mar 2024 07:00  --------------------------------------------------------  IN: 620 mL / OUT: 700 mL / NET: -80 mL      REVIEW OF SYSTEMS:  CONSTITUTIONAL: No fever, weight loss, or fatigue  EYES: No eye pain, visual disturbances, or discharge  ENMT:  ; No sinus or throat pain  NECK: No pain or stiffness  BREASTS: No pain, no masses  RESPIRATORY: No cough, wheezing, chills   No shortness of breath  CARDIOVASCULAR: No chest pain, palpitations, dizziness, or leg swelling  GASTROINTESTINAL: No abdominal or epigastric pain. No nausea, vomiting, No diarrhea or constipation.   GENITOURINARY: No dysuria, frequency, hematuria, or incontinence  NEUROLOGICAL: No headaches, memory loss, loss of strength, numbness, or tremors  SKIN: No itching, burning, rashes, or lesions   MUSCULOSKELETAL: No joint pain or swelling; No muscle, back, or extremity pain    PHYSICAL EXAM:  GENERAL: NAD,   HEAD:  Atraumatic, Normocephalic  EYES: EOMI, PERRLA, conjunctiva and sclera clear  ENMT: Moist mucous membranes  NECK: Supple, No JVD  NERVOUS SYSTEM:  Alert & Oriented X3; Motor Strength 5/5 B/L upper and lower extremities; DTRs 2+ intact and symmetric  CHEST/LUNG:   percussion bilaterally; No rales, rhonchi, wheezing   HEART: Regular rate and rhythm; No murmurs,  no tachy  ABDOMEN: Soft, Nontender, Nondistended; Bowel sounds present  EXTREMITIES:  2+ Peripheral Pulses, No clubbing, cyanosis, or edema  SKIN: No rashes or lesions      MEDICATIONS  (STANDING):  aspirin enteric coated 81 milliGRAM(s) Oral daily  atorvastatin 80 milliGRAM(s) Oral at bedtime  chlorhexidine 2% Cloths 1 Application(s) Topical <User Schedule>  clopidogrel Tablet 75 milliGRAM(s) Oral daily  enoxaparin Injectable 40 milliGRAM(s) SubCutaneous every 24 hours  pantoprazole    Tablet 40 milliGRAM(s) Oral before breakfast    MEDICATIONS  (PRN):  acetaminophen     Tablet .. 650 milliGRAM(s) Oral every 6 hours PRN Mild Pain (1 - 3)  hydrALAZINE Injectable 10 milliGRAM(s) IV Push every 6 hours PRN BP control      LABS:                        14.7   6.42  )-----------( 276      ( 23 Mar 2024 07:11 )             43.2     03-23    144  |  109<H>  |  20  ----------------------------<  85  3.9   |  30  |  0.70    Ca    9.3      23 Mar 2024 07:11  Phos  3.8     03-23  Mg     2.1     03-23    TPro  6.3  /  Alb  3.1<L>  /  TBili  0.6  /  DBili  x   /  AST  26  /  ALT  31  /  AlkPhos  63  03-23      Urinalysis Basic - ( 23 Mar 2024 07:11 )    Color: x / Appearance: x / SG: x / pH: x  Gluc: 85 mg/dL / Ketone: x  / Bili: x / Urobili: x   Blood: x / Protein: x / Nitrite: x   Leuk Esterase: x / RBC: x / WBC x   Sq Epi: x / Non Sq Epi: x / Bacteria: x      CAPILLARY BLOOD GLUCOSE          03-20 @ 18:41   <10,000 CFU/mL Normal Urogenital Yaima  --  --          RADIOLOGY & ADDITIONAL TESTS:    Imaging Personally Reviewed:   IMPRESSION:    ACUTE LEFT MCA TERRITORY INFARCT. THERE IS NO EVIDENCE OF ACUTE   INTRACRANIAL HEMORRHAGE.    SHORT SEGMENT OCCLUSION OF A LEFT PROXIMAL M2 BRANCH WITH DISTAL   RECONSTITUTION. ASYMMETRICALLY DIMINISHED DISTAL LEFT MCA BRANCHES.    Advance Directives:  full code    Palliative Care:  Appropriate

## 2024-03-23 NOTE — PROGRESS NOTE ADULT - NUTRITIONAL ASSESSMENT
This patient has been assessed with a concern for Malnutrition and has been determined to have a diagnosis/diagnoses of Moderate protein-calorie malnutrition.    This patient is being managed with:   Diet DASH/TLC-  Sodium & Cholesterol Restricted  Gluten-Gliadin Restricted  Entered: Mar 21 2024 12:43PM    The following pending diet order is being considered for treatment of Moderate protein-calorie malnutrition:  Diet Regular-  Gastroesophageal Reflux Disease  Gluten-Gliadin Restricted  Supplement Feeding Modality:  Oral  Ensure Plus High Protein Cans or Servings Per Day:  1       Frequency:  Daily  Entered: Mar 21 2024 12:07PM  

## 2024-03-23 NOTE — PROGRESS NOTE ADULT - SUBJECTIVE AND OBJECTIVE BOX
Neurology Follow up note    ALPA [id number]    HPI:  89 y/o F with PMHx HLD, celiac disease and GERD presenting with slurred speech since around 1:30pm today. Pt was sitting at the kitchen table with her  when he noticed she was staring off into space and unable to respond appropriately to questioning with slurred speech. Pt has no prior hx of stroke. Pt reports headache at this time. Denies cp, sob.  at bedside providing collateral history.    IN THE ED:  Temp  98 F , HR 64 ,  /76  ,RR 18 , SpO2 97% RA  S/P Labetalol 10mg IV x1, TNK @ 1700  EKG: NSR @ 64bpm  Labs significant for: K 3.3, Trop 402  Imaging:  CT Brain Stroke Protocol -   CT perfusion:Core infarct and ischemic penumbra in the left MCA territory:  CBF<30% volume: 3 ml. Left MCA territory, suggesting core infarct.  Tmax>6.0s volume: 33 ml  Mismatch volume: 30 ml, left MCA territory, suggesting ischemic penumbra.  Mismatch ratio: 11    CT angiography neck: No hemodynamically significant stenosis of the   bilateral cervical ICAs using NASCET criteria.  Patent vertebral   arteries.  No evidence of vascular dissection.    CT angiography brain:  1.  Short segment occlusion of a left proximal M2 branch (304:304,   607:53, 604:159) with distal reconstitution.  2.  No evidence of aneurysm. (20 Mar 2024 18:23)      Interval History -doing better    Patient is seen, chart was reviewed and case was discussed with the treatment team.  Pt is not in any distress.   Lying on bed comfortably.   No events reported overnight.       Vital Signs Last 24 Hrs  T(C): 36.4 (23 Mar 2024 05:34), Max: 36.4 (23 Mar 2024 05:34)  T(F): 97.6 (23 Mar 2024 05:34), Max: 97.6 (23 Mar 2024 05:34)  HR: 69 (23 Mar 2024 05:34) (69 - 69)  BP: 115/68 (23 Mar 2024 05:34) (115/68 - 115/68)  BP(mean): --  RR: 17 (23 Mar 2024 05:34) (17 - 17)  SpO2: 95% (23 Mar 2024 05:34) (95% - 95%)    Parameters below as of 23 Mar 2024 05:34  Patient On (Oxygen Delivery Method): room air            REVIEW OF SYSTEMS:    Constitutional: No fever, weight loss or fatigue  Eyes: No eye pain, visual disturbances, or discharge  ENT:  No difficulty hearing, tinnitus, vertigo; No sinus or throat pain  Neck: No pain or stiffness  Respiratory: No cough, wheezing, chills or hemoptysis  Cardiovascular: No chest pain, palpitations, shortness of breath, dizziness or leg swelling  Gastrointestinal: No abdominal or epigastric pain. No nausea, vomiting or hematemesis;  Genitourinary: No dysuria, frequency, hematuria or incontinence  Neurological: No headaches, memory loss, loss of strength, numbness or tremors  Psychiatric: No depression, anxiety, mood swings or difficulty sleeping  Musculoskeletal: No joint pain or swelling; No muscle, back or extremity pain  Skin: No itching, burning, rashes or lesions   Lymph Nodes: No enlarged glands  Endocrine: No heat or cold intolerance;   Allergy and Immunologic: No hives or eczema    On Neurological Examination:    Mental Status - Pt is alert, awake, oriented X3.. Follows commands well and able to answer questions appropriately.Mood and affect  normal    Speech -  mild aphasia    Cranial Nerves - Pupils 3 mm equal and reactive to light, extraocular eye movements intact. Pt has no visual field deficit.  Pt has no  facial asymmetry. Facial sensation is intact.Tongue - is in midline.    Muscle tone - is normal all over. Moves all extremities equally. No asymmetry is seen.      Motor Exam - 5/5 all over, No drift. No shaking or tremors.    Sensory Exam - Pin prick, temperature, joint position and vibration are intact on either side. Pt withdraws all extremities equally on stimulation. No asymmetry seen. No complaints of tingling, numbness.    Gait - Able to stand and walk unassisted.        coordination:    Finger to nose: merlene    Deep tendon Reflexes - 2 plus all over.         Neck Supple -  Yes.     MEDICATIONS    acetaminophen     Tablet .. 650 milliGRAM(s) Oral every 6 hours PRN  aspirin enteric coated 81 milliGRAM(s) Oral daily  atorvastatin 80 milliGRAM(s) Oral at bedtime  chlorhexidine 2% Cloths 1 Application(s) Topical <User Schedule>  clopidogrel Tablet 75 milliGRAM(s) Oral daily  enoxaparin Injectable 40 milliGRAM(s) SubCutaneous every 24 hours  hydrALAZINE Injectable 10 milliGRAM(s) IV Push every 6 hours PRN  pantoprazole    Tablet 40 milliGRAM(s) Oral before breakfast      Allergies    No Known Drug Allergies  Gluten (Flatulence; Other; Diarrhea)    Intolerances        LABS:  CBC Full  -  ( 23 Mar 2024 07:11 )  WBC Count : 6.42 K/uL  RBC Count : 4.61 M/uL  Hemoglobin : 14.7 g/dL  Hematocrit : 43.2 %  Platelet Count - Automated : 276 K/uL  Mean Cell Volume : 93.7 fl  Mean Cell Hemoglobin : 31.9 pg  Mean Cell Hemoglobin Concentration : 34.0 gm/dL  Auto Neutrophil # : 3.93 K/uL  Auto Lymphocyte # : 1.18 K/uL  Auto Monocyte # : 1.00 K/uL  Auto Eosinophil # : 0.23 K/uL  Auto Basophil # : 0.06 K/uL  Auto Neutrophil % : 61.2 %  Auto Lymphocyte % : 18.4 %  Auto Monocyte % : 15.6 %  Auto Eosinophil % : 3.6 %  Auto Basophil % : 0.9 %    Urinalysis Basic - ( 23 Mar 2024 07:11 )    Color: x / Appearance: x / SG: x / pH: x  Gluc: 85 mg/dL / Ketone: x  / Bili: x / Urobili: x   Blood: x / Protein: x / Nitrite: x   Leuk Esterase: x / RBC: x / WBC x   Sq Epi: x / Non Sq Epi: x / Bacteria: x      03-23    144  |  109<H>  |  20  ----------------------------<  85  3.9   |  30  |  0.70    Ca    9.3      23 Mar 2024 07:11  Phos  3.8     03-23  Mg     2.1     03-23    TPro  6.3  /  Alb  3.1<L>  /  TBili  0.6  /  DBili  x   /  AST  26  /  ALT  31  /  AlkPhos  63  03-23    Hemoglobin A1C:     Vitamin B12     RADIOLOGY    ASSESSMENT AND PLAN:      seen for slurred speech  consistent  with subacute  small left mca infarct    on ap/statin  Physical therapy evaluation.  OOB to chair/ambulation with assistance only.  Pain is accessed and addressed.  Plan of care was discussed with family. Questions answered.  Would continue to follow.

## 2024-03-23 NOTE — PROGRESS NOTE ADULT - PROBLEM SELECTOR PLAN 4
-home med rosuvastatin 5mg  -will start high dose statin when able to tolerate po

## 2024-03-24 ENCOUNTER — TRANSCRIPTION ENCOUNTER (OUTPATIENT)
Age: 89
End: 2024-03-24

## 2024-03-24 VITALS
OXYGEN SATURATION: 93 % | HEART RATE: 93 BPM | TEMPERATURE: 98 F | DIASTOLIC BLOOD PRESSURE: 66 MMHG | SYSTOLIC BLOOD PRESSURE: 111 MMHG | RESPIRATION RATE: 18 BRPM

## 2024-03-24 PROCEDURE — 71045 X-RAY EXAM CHEST 1 VIEW: CPT

## 2024-03-24 PROCEDURE — 87640 STAPH A DNA AMP PROBE: CPT

## 2024-03-24 PROCEDURE — 36415 COLL VENOUS BLD VENIPUNCTURE: CPT

## 2024-03-24 PROCEDURE — 80053 COMPREHEN METABOLIC PANEL: CPT

## 2024-03-24 PROCEDURE — 0042T: CPT | Mod: MC

## 2024-03-24 PROCEDURE — 92610 EVALUATE SWALLOWING FUNCTION: CPT

## 2024-03-24 PROCEDURE — 97166 OT EVAL MOD COMPLEX 45 MIN: CPT

## 2024-03-24 PROCEDURE — 85025 COMPLETE CBC W/AUTO DIFF WBC: CPT

## 2024-03-24 PROCEDURE — 70551 MRI BRAIN STEM W/O DYE: CPT | Mod: MC

## 2024-03-24 PROCEDURE — 93306 TTE W/DOPPLER COMPLETE: CPT

## 2024-03-24 PROCEDURE — 99285 EMERGENCY DEPT VISIT HI MDM: CPT

## 2024-03-24 PROCEDURE — 93308 TTE F-UP OR LMTD: CPT

## 2024-03-24 PROCEDURE — 99239 HOSP IP/OBS DSCHRG MGMT >30: CPT

## 2024-03-24 PROCEDURE — 83036 HEMOGLOBIN GLYCOSYLATED A1C: CPT

## 2024-03-24 PROCEDURE — 85730 THROMBOPLASTIN TIME PARTIAL: CPT

## 2024-03-24 PROCEDURE — 81003 URINALYSIS AUTO W/O SCOPE: CPT

## 2024-03-24 PROCEDURE — 86850 RBC ANTIBODY SCREEN: CPT

## 2024-03-24 PROCEDURE — 99231 SBSQ HOSP IP/OBS SF/LOW 25: CPT

## 2024-03-24 PROCEDURE — 96374 THER/PROPH/DIAG INJ IV PUSH: CPT

## 2024-03-24 PROCEDURE — 84484 ASSAY OF TROPONIN QUANT: CPT

## 2024-03-24 PROCEDURE — 70450 CT HEAD/BRAIN W/O DYE: CPT | Mod: MC

## 2024-03-24 PROCEDURE — 70498 CT ANGIOGRAPHY NECK: CPT | Mod: MC

## 2024-03-24 PROCEDURE — 80061 LIPID PANEL: CPT

## 2024-03-24 PROCEDURE — 85610 PROTHROMBIN TIME: CPT

## 2024-03-24 PROCEDURE — 87641 MR-STAPH DNA AMP PROBE: CPT

## 2024-03-24 PROCEDURE — 93005 ELECTROCARDIOGRAM TRACING: CPT

## 2024-03-24 PROCEDURE — 85027 COMPLETE CBC AUTOMATED: CPT

## 2024-03-24 PROCEDURE — 86901 BLOOD TYPING SEROLOGIC RH(D): CPT

## 2024-03-24 PROCEDURE — 84100 ASSAY OF PHOSPHORUS: CPT

## 2024-03-24 PROCEDURE — 83735 ASSAY OF MAGNESIUM: CPT

## 2024-03-24 PROCEDURE — 96375 TX/PRO/DX INJ NEW DRUG ADDON: CPT

## 2024-03-24 PROCEDURE — 70496 CT ANGIOGRAPHY HEAD: CPT | Mod: MC

## 2024-03-24 PROCEDURE — 87086 URINE CULTURE/COLONY COUNT: CPT

## 2024-03-24 PROCEDURE — 97162 PT EVAL MOD COMPLEX 30 MIN: CPT

## 2024-03-24 PROCEDURE — 86900 BLOOD TYPING SEROLOGIC ABO: CPT

## 2024-03-24 PROCEDURE — 82962 GLUCOSE BLOOD TEST: CPT

## 2024-03-24 PROCEDURE — 70544 MR ANGIOGRAPHY HEAD W/O DYE: CPT

## 2024-03-24 RX ORDER — ROSUVASTATIN CALCIUM 5 MG/1
1 TABLET ORAL
Refills: 0 | DISCHARGE

## 2024-03-24 RX ORDER — ASPIRIN/CALCIUM CARB/MAGNESIUM 324 MG
2 TABLET ORAL
Refills: 0 | DISCHARGE
Start: 2024-03-24

## 2024-03-24 RX ORDER — CLOPIDOGREL BISULFATE 75 MG/1
1 TABLET, FILM COATED ORAL
Qty: 30 | Refills: 0
Start: 2024-03-24 | End: 2024-04-22

## 2024-03-24 RX ORDER — ATORVASTATIN CALCIUM 80 MG/1
1 TABLET, FILM COATED ORAL
Qty: 30 | Refills: 0
Start: 2024-03-24 | End: 2024-04-22

## 2024-03-24 RX ADMIN — CLOPIDOGREL BISULFATE 75 MILLIGRAM(S): 75 TABLET, FILM COATED ORAL at 11:08

## 2024-03-24 RX ADMIN — PANTOPRAZOLE SODIUM 40 MILLIGRAM(S): 20 TABLET, DELAYED RELEASE ORAL at 05:10

## 2024-03-24 RX ADMIN — Medication 81 MILLIGRAM(S): at 11:08

## 2024-03-24 NOTE — DISCHARGE NOTE NURSING/CASE MANAGEMENT/SOCIAL WORK - NSDCVIVACCINE_GEN_ALL_CORE_FT
minimum assist (75% patients effort)
Tdap; 26-Aug-2018 06:48; Ty King (RN); [employer]; [id number]; IntraMuscular; Deltoid Left.; 0.5 milliLiter(s); VIS (VIS Published: 09-May-2013, VIS Presented: 26-Aug-2018);

## 2024-03-24 NOTE — DISCHARGE NOTE NURSING/CASE MANAGEMENT/SOCIAL WORK - NSSCTYPOFSERV_GEN_ALL_CORE
Visiting Nurse/Physical Therapy- you should receive a call within 24-48 hours to schedule the first visit. If you have not received a call please contact the agency at the number listed above.

## 2024-03-24 NOTE — DISCHARGE NOTE NURSING/CASE MANAGEMENT/SOCIAL WORK - PATIENT PORTAL LINK FT
You can access the FollowMyHealth Patient Portal offered by [facility] by registering at the following website: [email or web address]. By joining [facility]’s FollowMyHealth portal, you will also be able to view your health information using other applications (apps) compatible with our system.

## 2024-03-24 NOTE — CASE MANAGEMENT PROGRESS NOTE - NSCMPROGRESSNOTE_GEN_ALL_CORE
Per MD, patient is medically cleared for discharge home today.  CM met with patient to discuss discharge disposition and home care awaiting acceptance from [facility] [phone]. Patient verbalized understanding of the transition plan and is in agreement. Patient has no PT/OT needs. DMEs in home include RW. CM discussed plan with MD and RN. Patient stated her  will transport her home.  CM remains available throughout hospital stay.

## 2024-03-24 NOTE — PROGRESS NOTE ADULT - ASSESSMENT
88 year-old F with PMH  of supraventricular ectopy, palpitations, dyspnea, mild aortic insufficiency, mild carotid atherosclerosis, HLD, celiac disease, a hepatic cyst, a R kidney cyst, a cystocele, left thyroid nodules, osteoporosis, urinary stress incontinence (pessary) presents with slurred speech, now s/p TNK    Acute Stroke, HLD   - CTA Brain showed core infarct and ischemic penumbra in the left MCA territory: CBF<30% volume: 3 ml. Left MCA territory, suggesting core infarct.  MRI head showed acute left MCA infarct S/P TNK  - Neurology following.  Continue DAPT and high-intensity statin  - No evidence of Afib on tele so far.  Will need outpatient ILR.  Instructed to f/u with us   - TTE showed normal LV/RVF with no significant valvular disease with small to moderate pericardial effusion.   Repeat showed trace     - -150s  - Would not start any anti-HTN meds at this time as BP mostly on the low side and 150 maybe an outlier    - EKG showed SR @ 64, LVH  - Mild troponin leak noted, though no suggestion of ACS  - No need to trend further    - No evidence of any meaningful volume overload   - Monitor and replete lytes, keep K>4, Mg>2.  - Will continue to follow.  Otherwise, stable from cardiac standpoint    Zita Monteiro DNP, NP-C, AGACNP-C  Cardiology   Call TEAMS

## 2024-03-24 NOTE — PROGRESS NOTE ADULT - PROVIDER SPECIALTY LIST ADULT
Neurology
Neurology
Cardiology
Neurology
Cardiology
Cardiology
Critical Care
Critical Care
Cardiology
Hospitalist

## 2024-03-24 NOTE — CHART NOTE - NSCHARTNOTEFT_GEN_A_CORE
Assessment:   88yFemalePatient is a 88y old  Female who presents with a chief complaint of CVA (24 Mar 2024 06:49)  Pt seen at bedside with  present s/p ICU on step down. Pt ate 100% of lunch without c/o N/V/D/C at this time. No complaints of issues chewing and swallowing. Reviewed preferences with pt and [employer] rafaela to educate on finding gluten free options in community. Pt asking for verbal education on foods that exacerbate GERD, verbal education on recipes and cooking technques provided to both pt and , both appreciative Pt and  appreciative. unsure of last Bm, to monitor. Labs reviewed, nutritionally pertinent labs WNL. Tolerating diet well. Continue to monitor. RD to f/u.       Factors impacting intake: [ ] none [ ] nausea  [ ] vomiting [ ] diarrhea [ ] constipation  [ ]chewing problems [ ] swallowing issues  [ ] other:     Diet Presciption: Diet, DASH/TLC:   Sodium & Cholesterol Restricted  Gluten-Gliadin Restricted (03-21-24 @ 12:43);     Intake: 100%    Current Weight: Weight (kg): 45.3 (03-20 @ 18:55); 3/ lbs.       Pertinent Medications: MEDICATIONS  (STANDING):  aspirin enteric coated 81 milliGRAM(s) Oral daily  atorvastatin 80 milliGRAM(s) Oral at bedtime  clopidogrel Tablet 75 milliGRAM(s) Oral daily  enoxaparin Injectable 40 milliGRAM(s) SubCutaneous every 24 hours  pantoprazole    Tablet 40 milliGRAM(s) Oral before breakfast    MEDICATIONS  (PRN):  acetaminophen     Tablet .. 650 milliGRAM(s) Oral every 6 hours PRN Mild Pain (1 - 3)  hydrALAZINE Injectable 10 milliGRAM(s) IV Push every 6 hours PRN BP control    Pertinent Labs: nutrition labs WNL  Skin: intact    Estimated Needs:   [x ] no change since previous assessment  [ ] recalculated:     Previous Nutrition Diagnosis:   [ ] Inadequate Energy Intake [ ]Inadequate Oral Intake [ ] Excessive Energy Intake   [ ] Underweight [ ] Increased Nutrient Needs [ ] Overweight/Obesity   [ ] Altered GI Function [ ] Unintended Weight Loss [ ] Food & Nutrition Related Knowledge Deficit [x ] Malnutrition     Nutrition Diagnosis is [x ] ongoing  [ ] resolved [ ] not applicable     New Nutrition Diagnosis: [x ] not applicable       Interventions:   Recommend  [ ] Change Diet To:  [ ] Nutrition Supplement  [ ] Nutrition Support  [x ] Other: continue diet as ordered; educated on resources to use to find gluten free restaurants in community.     Monitoring and Evaluation:   [ x] PO intake [ x ] Tolerance to diet prescription [ x ] weights [ x ] labs[ x ] follow up per protocol  [ x] other: skin, labs, BM, sx GI distress.

## 2024-03-24 NOTE — DISCHARGE NOTE NURSING/CASE MANAGEMENT/SOCIAL WORK - NSTOBACCONEVERSMOKERY/N_GEN_A
HealthSouth Rehabilitation Hospital of Colorado SpringsC  No

## 2024-03-24 NOTE — PROGRESS NOTE ADULT - SUBJECTIVE AND OBJECTIVE BOX
[facility] -- Tori Vila, Marisol, Shannon Villela, , Carlos Bautista  Office # [phone]    Follow Up:      Subjective/Observations:     REVIEW OF SYSTEMS: All other review of systems is negative unless indicated above  PAST MEDICAL & SURGICAL HISTORY:  Arthritis      Osteoporosis      Prolapsed uterus      Celiac sprue      Elective surgery  pessary placed  as outpatient  2016      H/O major orthopedic surgery  kell placed due to fx        MEDICATIONS  (STANDING):  aspirin enteric coated 81 milliGRAM(s) Oral daily  atorvastatin 80 milliGRAM(s) Oral at bedtime  clopidogrel Tablet 75 milliGRAM(s) Oral daily  enoxaparin Injectable 40 milliGRAM(s) SubCutaneous every 24 hours  pantoprazole    Tablet 40 milliGRAM(s) Oral before breakfast    MEDICATIONS  (PRN):  acetaminophen     Tablet .. 650 milliGRAM(s) Oral every 6 hours PRN Mild Pain (1 - 3)  hydrALAZINE Injectable 10 milliGRAM(s) IV Push every 6 hours PRN BP control    Allergies    No Known Drug Allergies  Gluten (Flatulence; Other; Diarrhea)    Intolerances      Vital Signs Last 24 Hrs  T(C): 36.4 (24 Mar 2024 04:12), Max: 36.9 (23 Mar 2024 20:13)  T(F): 97.5 (24 Mar 2024 04:12), Max: 98.5 (23 Mar 2024 20:13)  HR: 73 (24 Mar 2024 04:12) (73 - 77)  BP: 154/73 (24 Mar 2024 04:12) (108/67 - 154/73)  BP(mean): --  RR: 18 (24 Mar 2024 04:12) (17 - 18)  SpO2: 95% (24 Mar 2024 04:12) (92% - 95%)    Parameters below as of 24 Mar 2024 04:12  Patient On (Oxygen Delivery Method): room air      I&O's Summary    22 Mar 2024 07:01  -  23 Mar 2024 07:00  --------------------------------------------------------  IN: 620 mL / OUT: 700 mL / NET: -80 mL    23 Mar 2024 07:01  -  24 Mar 2024 06:49  --------------------------------------------------------  IN: 0 mL / OUT: 1 mL / NET: -1 mL        PHYSICAL EXAM:  TELE:   Constitutional: NAD, awake and alert, well-developed  HEENT: Moist Mucous Membranes, Anicteric  Pulmonary: Non-labored, breath sounds are clear bilaterally, No wheezing, rales or rhonchi  Cardiovascular: Regular, S1 and S2, No murmurs, rubs, gallops or clicks  Gastrointestinal: Bowel Sounds present, soft, nontender.   Lymph: No peripheral edema. No lymphadenopathy.  Skin: No visible rashes or ulcers.  Psych:  Mood & affect appropriate  LABS: All Labs Reviewed:                        14.7   6.42  )-----------( 276      ( 23 Mar 2024 07:11 )             43.2                         14.5   7.08  )-----------( 294      ( 22 Mar 2024 09:10 )             43.2     23 Mar 2024 07:11    144    |  109    |  20     ----------------------------<  85     3.9     |  30     |  0.70   22 Mar 2024 09:10    143    |  107    |  13     ----------------------------<  148    3.9     |  26     |  0.93     Ca    9.3        23 Mar 2024 07:11  Ca    9.3        22 Mar 2024 09:10  Phos  3.8       23 Mar 2024 07:11  Phos  3.1       22 Mar 2024 09:10  Mg     2.1       23 Mar 2024 07:11  Mg     2.1       22 Mar 2024 09:10    TPro  6.3    /  Alb  3.1    /  TBili  0.6    /  DBili  x      /  AST  26     /  ALT  31     /  AlkPhos  63     23 Mar 2024 07:11  TPro  6.2    /  Alb  3.2    /  TBili  0.6    /  DBili  x      /  AST  31     /  ALT  31     /  AlkPhos  67     22 Mar 2024 09:10          12 Lead ECG:   Ventricular Rate 64 BPM    Atrial Rate 64 BPM    P-R Interval 152 ms    QRS Duration 74 ms    Q-T Interval 412 ms    QTC Calculation(Bazett) 425 ms    P Axis 66 degrees    R Axis 66 degrees    T Axis 22 degrees    Diagnosis Line Normal sinus rhythm  Possible Left atrial enlargement  Left ventricular hypertrophy ( Sokolow-Crawford , Romhilt-Ruvalcaba )  Nonspecific ST abnormality  Abnormal ECG  Confirmed by darrian Ortega ([id number]) on 3/21/2024 1:30:01 PM (03-20-24 @ 15:59)      TRANSTHORACIC ECHOCARDIOGRAM REPORT  ________________________________________________________________________________                                      _______       Pt. Name:       ALPA BHATIA Study Date:    3/23/2024  MRN:            [id number]         YOB: 1935  Accession #:    [id number]        Age:           88 years  Account#:       [id number]       Gender:        F  Heart Rate:                      Height:        57.87 in (147.00 cm)  Rhythm:                          Weight:        99.21 lb (45.00 kg)  Blood Pressure: 115/68 mmHg      BSA/BMI:       1.35 m² / 20.82 kg/m²  ________________________________________________________________________________________  Referring Physician:    [id number] Lisandro Ward  Interpreting Physician: Rahda Bautista MD  Primary Sonographer:    Leslye BELLO    CPT:               ECHO TTE W/O CON F/U LTD - 15059.m  Indication(s):     Other cerebrovascular disease - I67.89  Procedure:         Limited transthoracic echocardiogram.  Ordering Location: ICU1  Admission Status:  Inpatient  _______________________________________________________________________________________     CONCLUSIONS:      1. Left ventricular wall thickness is normal. Left ventricular systolic function is normal with an ejection fraction visually estimated at 55 to 60 %.   2. Normal left ventricular diastolic function.   3. Normal right ventricular cavity size, with normal wall thickness, and normal systolic function.   4. Trace to small pericardial effusion.   5. The inferior vena cava is normal in size (normal <2.1cm) with normal inspiratory collapse (normal >50%) consistent with normal right atrial pressure (~3, range 0-5mmHg).    ________________________________________________________________________________________  FINDINGS:     Left Ventricle:  Left ventricular wall thickness is normal. Left ventricular systolic function is normal with an ejection fraction visually estimated at 55 to 60%. There is normal left ventricular diastolic function.     Right Ventricle:  The right ventricular cavity is normal in size, with normal wall thickness and normal systolic function.     Aortic Valve:  There is no aortic valve stenosis.     Pericardium:  There is a trace pericardial effusion.     Systemic Veins:  The inferior vena cava is normal in size (normal <2.1cm) with normal inspiratory collapse (normal >50%) consistent with normal right atrial pressure (~3, range 0-5mmHg).  ____________________________________________________________________  QUANTITATIVE DATA:  Left Ventricle Measurements: (Indexed to BSA)     Visualized LV EF%: 55 to 60%       Tricuspid Valve Measurements:     RA Pressure: 3 mmHg  ________________________________________________________________________________________  Electronically signed on 3/23/2024 at 2:12:48 PM by Radha Bautista MD     *** Final ***      [facility] -- Tori Vila, Marisol, Shannon Villela, , Carlos Bautista  Office # [phone]    Follow Up:    Acute CVA s/p TN    Subjective/Observations: Sitting on the chair, having breakfast.  Remains asymptomatic.  No complaints.  Ready to go home    REVIEW OF SYSTEMS: All other review of systems is negative unless indicated above  PAST MEDICAL & SURGICAL HISTORY:  Arthritis  Osteoporosis  Prolapsed uterus  Celiac sprue  Elective surgery  pessary placed  as outpatient  2016  H/O major orthopedic surgery  kell placed due to fx    MEDICATIONS  (STANDING):  aspirin enteric coated 81 milliGRAM(s) Oral daily  atorvastatin 80 milliGRAM(s) Oral at bedtime  clopidogrel Tablet 75 milliGRAM(s) Oral daily  enoxaparin Injectable 40 milliGRAM(s) SubCutaneous every 24 hours  pantoprazole    Tablet 40 milliGRAM(s) Oral before breakfast    MEDICATIONS  (PRN):  acetaminophen     Tablet .. 650 milliGRAM(s) Oral every 6 hours PRN Mild Pain (1 - 3)  hydrALAZINE Injectable 10 milliGRAM(s) IV Push every 6 hours PRN BP control    Allergies    No Known Drug Allergies  Gluten (Flatulence; Other; Diarrhea)    Intolerances      Vital Signs Last 24 Hrs  T(C): 36.4 (24 Mar 2024 04:12), Max: 36.9 (23 Mar 2024 20:13)  T(F): 97.5 (24 Mar 2024 04:12), Max: 98.5 (23 Mar 2024 20:13)  HR: 73 (24 Mar 2024 04:12) (73 - 77)  BP: 154/73 (24 Mar 2024 04:12) (108/67 - 154/73)  BP(mean): --  RR: 18 (24 Mar 2024 04:12) (17 - 18)  SpO2: 95% (24 Mar 2024 04:12) (92% - 95%)    Parameters below as of 24 Mar 2024 04:12  Patient On (Oxygen Delivery Method): room air      I&O's Summary    22 Mar 2024 07:01  -  23 Mar 2024 07:00  --------------------------------------------------------  IN: 620 mL / OUT: 700 mL / NET: -80 mL    23 Mar 2024 07:01  -  24 Mar 2024 06:49  --------------------------------------------------------  IN: 0 mL / OUT: 1 mL / NET: -1 mL     PHYSICAL EXAM:  TELE: Not on tele  Constitutional: NAD, awake and alert, well-developed  HEENT: Moist Mucous Membranes, Anicteric  Pulmonary: Non-labored, breath sounds are clear but diminished bilaterally, No wheezing, rales or rhonchi  Cardiovascular: Regular, S1 and S2, No murmurs, rubs, gallops or clicks  Gastrointestinal: Bowel Sounds present, soft, nontender.   Lymph: No peripheral edema. No lymphadenopathy.  Skin: No visible rashes or ulcers.  Psych:  Mood & affect appropriate  LABS: All Labs Reviewed:                        14.7   6.42  )-----------( 276      ( 23 Mar 2024 07:11 )             43.2                         14.5   7.08  )-----------( 294      ( 22 Mar 2024 09:10 )             43.2     23 Mar 2024 07:11    144    |  109    |  20     ----------------------------<  85     3.9     |  30     |  0.70   22 Mar 2024 09:10    143    |  107    |  13     ----------------------------<  148    3.9     |  26     |  0.93     Ca    9.3        23 Mar 2024 07:11  Ca    9.3        22 Mar 2024 09:10  Phos  3.8       23 Mar 2024 07:11  Phos  3.1       22 Mar 2024 09:10  Mg     2.1       23 Mar 2024 07:11  Mg     2.1       22 Mar 2024 09:10    TPro  6.3    /  Alb  3.1    /  TBili  0.6    /  DBili  x      /  AST  26     /  ALT  31     /  AlkPhos  63     23 Mar 2024 07:11  TPro  6.2    /  Alb  3.2    /  TBili  0.6    /  DBili  x      /  AST  31     /  ALT  31     /  AlkPhos  67     22 Mar 2024 09:10    12 Lead ECG:   Ventricular Rate 64 BPM    Atrial Rate 64 BPM    P-R Interval 152 ms    QRS Duration 74 ms    Q-T Interval 412 ms    QTC Calculation(Bazett) 425 ms    P Axis 66 degrees    R Axis 66 degrees    T Axis 22 degrees    Diagnosis Line Normal sinus rhythm  Possible Left atrial enlargement  Left ventricular hypertrophy ( Sokolow-Crawford , Romhilt-Ruvalcaba )  Nonspecific ST abnormality  Abnormal ECG  Confirmed by darrian Ortega ([id number]) on 3/21/2024 1:30:01 PM (03-20-24 @ 15:59)      TRANSTHORACIC ECHOCARDIOGRAM REPORT  ________________________________________________________________________________                                      _______       Pt. Name:       ALPA BHATIA Study Date:    3/23/2024  MRN:            [id number]         YOB: 1935  Accession #:    [id number]        Age:           88 years  Account#:       [id number]       Gender:        F  Heart Rate:                      Height:        57.87 in (147.00 cm)  Rhythm:                          Weight:        99.21 lb (45.00 kg)  Blood Pressure: 115/68 mmHg      BSA/BMI:       1.35 m² / 20.82 kg/m²  ________________________________________________________________________________________  Referring Physician:    [id number] Lisandro Ward  Interpreting Physician: Radha Bautista MD  Primary Sonographer:    Leslye BELLO    CPT:               ECHO TTE W/O CON F/U LTD - 97020.m  Indication(s):     Other cerebrovascular disease - I67.89  Procedure:         Limited transthoracic echocardiogram.  Ordering Location: ICU1  Admission Status:  Inpatient  _______________________________________________________________________________________     CONCLUSIONS:      1. Left ventricular wall thickness is normal. Left ventricular systolic function is normal with an ejection fraction visually estimated at 55 to 60 %.   2. Normal left ventricular diastolic function.   3. Normal right ventricular cavity size, with normal wall thickness, and normal systolic function.   4. Trace to small pericardial effusion.   5. The inferior vena cava is normal in size (normal <2.1cm) with normal inspiratory collapse (normal >50%) consistent with normal right atrial pressure (~3, range 0-5mmHg).    ________________________________________________________________________________________  FINDINGS:     Left Ventricle:  Left ventricular wall thickness is normal. Left ventricular systolic function is normal with an ejection fraction visually estimated at 55 to 60%. There is normal left ventricular diastolic function.     Right Ventricle:  The right ventricular cavity is normal in size, with normal wall thickness and normal systolic function.     Aortic Valve:  There is no aortic valve stenosis.     Pericardium:  There is a trace pericardial effusion.     Systemic Veins:  The inferior vena cava is normal in size (normal <2.1cm) with normal inspiratory collapse (normal >50%) consistent with normal right atrial pressure (~3, range 0-5mmHg).  ____________________________________________________________________  QUANTITATIVE DATA:  Left Ventricle Measurements: (Indexed to BSA)     Visualized LV EF%: 55 to 60%       Tricuspid Valve Measurements:     RA Pressure: 3 mmHg  ________________________________________________________________________________________  Electronically signed on 3/23/2024 at 2:12:48 PM by Radha Bautista MD     *** Final ***

## 2024-03-24 NOTE — PROGRESS NOTE ADULT - SUBJECTIVE AND OBJECTIVE BOX
Neurology Follow up note    ALPA [id number]    HPI:  89 y/o F with PMHx HLD, celiac disease and GERD presenting with slurred speech since around 1:30pm today. Pt was sitting at the kitchen table with her  when he noticed she was staring off into space and unable to respond appropriately to questioning with slurred speech. Pt has no prior hx of stroke. Pt reports headache at this time. Denies cp, sob.  at bedside providing collateral history.    IN THE ED:  Temp  98 F , HR 64 ,  /76  ,RR 18 , SpO2 97% RA  S/P Labetalol 10mg IV x1, TNK @ 1700  EKG: NSR @ 64bpm  Labs significant for: K 3.3, Trop 402  Imaging:  CT Brain Stroke Protocol -   CT perfusion:Core infarct and ischemic penumbra in the left MCA territory:  CBF<30% volume: 3 ml. Left MCA territory, suggesting core infarct.  Tmax>6.0s volume: 33 ml  Mismatch volume: 30 ml, left MCA territory, suggesting ischemic penumbra.  Mismatch ratio: 11    CT angiography neck: No hemodynamically significant stenosis of the   bilateral cervical ICAs using NASCET criteria.  Patent vertebral   arteries.  No evidence of vascular dissection.    CT angiography brain:  1.  Short segment occlusion of a left proximal M2 branch (304:304,   607:53, 604:159) with distal reconstitution.  2.  No evidence of aneurysm. (20 Mar 2024 18:23)      Interval History -no new events    Patient is seen, chart was reviewed and case was discussed with the treatment team.  Pt is not in any distress.   Lying on bed comfortably.          Vital Signs Last 24 Hrs  T(C): 36.6 (24 Mar 2024 13:42), Max: 36.9 (23 Mar 2024 20:13)  T(F): 97.8 (24 Mar 2024 13:42), Max: 98.5 (23 Mar 2024 20:13)  HR: 93 (24 Mar 2024 13:42) (73 - 93)  BP: 111/66 (24 Mar 2024 13:42) (108/67 - 154/73)  BP(mean): --  RR: 18 (24 Mar 2024 13:42) (17 - 18)  SpO2: 93% (24 Mar 2024 13:42) (92% - 95%)    Parameters below as of 24 Mar 2024 04:12  Patient On (Oxygen Delivery Method): room air                REVIEW OF SYSTEMS:    Constitutional: No fever, weight loss or fatigue  Eyes: No eye pain, visual disturbances, or discharge  ENT:  No difficulty hearing, tinnitus, vertigo; No sinus or throat pain  Neck: No pain or stiffness  Respiratory: No cough, wheezing, chills or hemoptysis  Cardiovascular: No chest pain, palpitations, shortness of breath, dizziness or leg swelling  Gastrointestinal: No abdominal or epigastric pain. No nausea, vomiting or hematemesis;  Genitourinary: No dysuria, frequency, hematuria or incontinence  Neurological: No headaches, memory loss, loss of strength, numbness or tremors  Psychiatric: No depression, anxiety, mood swings or difficulty sleeping  Musculoskeletal: No joint pain or swelling; No muscle, back or extremity pain  Skin: No itching, burning, rashes or lesions   Lymph Nodes: No enlarged glands  Endocrine: No heat or cold intolerance;   Allergy and Immunologic: No hives or eczema    On Neurological Examination:    Mental Status - Pt is alert, awake, oriented X3.. Follows commands well and able to answer questions appropriately.Mood and affect  normal    Speech -  mild aphasia    Cranial Nerves - Pupils 3 mm equal and reactive to light, extraocular eye movements intact. Pt has no visual field deficit.  Pt has no  facial asymmetry. Facial sensation is intact.Tongue - is in midline.    Muscle tone - is normal all over. Moves all extremities equally. No asymmetry is seen.      Motor Exam - 5/5 all over, No drift. No shaking or tremors.    Sensory Exam - Pin prick, temperature, joint position and vibration are intact on either side. Pt withdraws all extremities equally on stimulation. No asymmetry seen. No complaints of tingling, numbness.    Gait - Able to stand and walk unassisted.        coordination:    Finger to nose: merlene    Deep tendon Reflexes - 2 plus all over.         Neck Supple -  Yes.     MEDICATIONS    acetaminophen     Tablet .. 650 milliGRAM(s) Oral every 6 hours PRN  aspirin enteric coated 81 milliGRAM(s) Oral daily  atorvastatin 80 milliGRAM(s) Oral at bedtime  chlorhexidine 2% Cloths 1 Application(s) Topical <User Schedule>  clopidogrel Tablet 75 milliGRAM(s) Oral daily  enoxaparin Injectable 40 milliGRAM(s) SubCutaneous every 24 hours  hydrALAZINE Injectable 10 milliGRAM(s) IV Push every 6 hours PRN  pantoprazole    Tablet 40 milliGRAM(s) Oral before breakfast      Allergies    No Known Drug Allergies  Gluten (Flatulence; Other; Diarrhea)    Intolerances            03-23    144  |  109<H>  |  20  ----------------------------<  85  3.9   |  30  |  0.70    Ca    9.3      23 Mar 2024 07:11  Phos  3.8     03-23  Mg     2.1     03-23    TPro  6.3  /  Alb  3.1<L>  /  TBili  0.6  /  DBili  x   /  AST  26  /  ALT  31  /  AlkPhos  63  03-23                14.7   6.42  )-----------( 276      ( 23 Mar 2024 07:11 )             43.2       Hemoglobin A1C:     Vitamin B12     RADIOLOGY    ASSESSMENT AND PLAN:      seen for slurred speech  consistent  with subacute  small left mca infarct    discharge planning  on ap/statin  Physical therapy evaluation.  OOB to chair/ambulation with assistance only.  Pain is accessed and addressed.  Plan of care was discussed with family. Questions answered.  Would continue to follow.

## 2024-03-25 ENCOUNTER — NON-APPOINTMENT (OUTPATIENT)
Age: 89
End: 2024-03-25

## 2024-03-26 ENCOUNTER — EMERGENCY (EMERGENCY)
Facility: HOSPITAL | Age: 89
LOS: 1 days | Discharge: HOME | End: 2024-03-26
Attending: EMERGENCY MEDICINE | Admitting: EMERGENCY MEDICINE
Payer: MEDICARE

## 2024-03-26 VITALS
HEIGHT: 58 IN | SYSTOLIC BLOOD PRESSURE: 145 MMHG | WEIGHT: 119.93 LBS | TEMPERATURE: 97 F | HEART RATE: 66 BPM | OXYGEN SATURATION: 100 % | RESPIRATION RATE: 20 BRPM | DIASTOLIC BLOOD PRESSURE: 89 MMHG

## 2024-03-26 DIAGNOSIS — Z98.890 OTHER SPECIFIED POSTPROCEDURAL STATES: Chronic | ICD-10-CM

## 2024-03-26 DIAGNOSIS — Z41.9 ENCOUNTER FOR PROCEDURE FOR PURPOSES OTHER THAN REMEDYING HEALTH STATE, UNSPECIFIED: Chronic | ICD-10-CM

## 2024-03-26 LAB
ALBUMIN SERPL ELPH-MCNC: 3.7 G/DL — SIGNIFICANT CHANGE UP (ref 3.3–5)
ALP SERPL-CCNC: 75 U/L — SIGNIFICANT CHANGE UP (ref 40–120)
ALT FLD-CCNC: 93 U/L — HIGH (ref 12–78)
ANION GAP SERPL CALC-SCNC: 4 MMOL/L — LOW (ref 5–17)
AST SERPL-CCNC: 113 U/L — HIGH (ref 15–37)
BASOPHILS # BLD AUTO: 0.06 K/UL — SIGNIFICANT CHANGE UP (ref 0–0.2)
BASOPHILS NFR BLD AUTO: 1.2 % — SIGNIFICANT CHANGE UP (ref 0–2)
BILIRUB SERPL-MCNC: 0.5 MG/DL — SIGNIFICANT CHANGE UP (ref 0.2–1.2)
BUN SERPL-MCNC: 15 MG/DL — SIGNIFICANT CHANGE UP (ref 7–23)
CALCIUM SERPL-MCNC: 9.6 MG/DL — SIGNIFICANT CHANGE UP (ref 8.5–10.1)
CHLORIDE SERPL-SCNC: 103 MMOL/L — SIGNIFICANT CHANGE UP (ref 96–108)
CK SERPL-CCNC: 129 U/L — SIGNIFICANT CHANGE UP (ref 26–192)
CO2 SERPL-SCNC: 31 MMOL/L — SIGNIFICANT CHANGE UP (ref 22–31)
CREAT SERPL-MCNC: 0.65 MG/DL — SIGNIFICANT CHANGE UP (ref 0.5–1.3)
EGFR: 85 ML/MIN/1.73M2 — SIGNIFICANT CHANGE UP
EOSINOPHIL # BLD AUTO: 0.19 K/UL — SIGNIFICANT CHANGE UP (ref 0–0.5)
EOSINOPHIL NFR BLD AUTO: 3.9 % — SIGNIFICANT CHANGE UP (ref 0–6)
GLUCOSE SERPL-MCNC: 120 MG/DL — HIGH (ref 70–99)
HCT VFR BLD CALC: 45.3 % — HIGH (ref 34.5–45)
HGB BLD-MCNC: 14.9 G/DL — SIGNIFICANT CHANGE UP (ref 11.5–15.5)
IMM GRANULOCYTES NFR BLD AUTO: 0.2 % — SIGNIFICANT CHANGE UP (ref 0–0.9)
LIDOCAIN IGE QN: 104 U/L — HIGH (ref 13–75)
LYMPHOCYTES # BLD AUTO: 0.67 K/UL — LOW (ref 1–3.3)
LYMPHOCYTES # BLD AUTO: 13.6 % — SIGNIFICANT CHANGE UP (ref 13–44)
MAGNESIUM SERPL-MCNC: 2.2 MG/DL — SIGNIFICANT CHANGE UP (ref 1.6–2.6)
MCHC RBC-ENTMCNC: 31.2 PG — SIGNIFICANT CHANGE UP (ref 27–34)
MCHC RBC-ENTMCNC: 32.9 GM/DL — SIGNIFICANT CHANGE UP (ref 32–36)
MCV RBC AUTO: 95 FL — SIGNIFICANT CHANGE UP (ref 80–100)
MONOCYTES # BLD AUTO: 0.49 K/UL — SIGNIFICANT CHANGE UP (ref 0–0.9)
MONOCYTES NFR BLD AUTO: 9.9 % — SIGNIFICANT CHANGE UP (ref 2–14)
NEUTROPHILS # BLD AUTO: 3.51 K/UL — SIGNIFICANT CHANGE UP (ref 1.8–7.4)
NEUTROPHILS NFR BLD AUTO: 71.2 % — SIGNIFICANT CHANGE UP (ref 43–77)
NRBC # BLD: 0 /100 WBCS — SIGNIFICANT CHANGE UP (ref 0–0)
PLATELET # BLD AUTO: 344 K/UL — SIGNIFICANT CHANGE UP (ref 150–400)
POTASSIUM SERPL-MCNC: 5.4 MMOL/L — HIGH (ref 3.5–5.3)
POTASSIUM SERPL-SCNC: 5.4 MMOL/L — HIGH (ref 3.5–5.3)
PROT SERPL-MCNC: 7.6 G/DL — SIGNIFICANT CHANGE UP (ref 6–8.3)
RBC # BLD: 4.77 M/UL — SIGNIFICANT CHANGE UP (ref 3.8–5.2)
RBC # FLD: 13.3 % — SIGNIFICANT CHANGE UP (ref 10.3–14.5)
SODIUM SERPL-SCNC: 138 MMOL/L — SIGNIFICANT CHANGE UP (ref 135–145)
TROPONIN I, HIGH SENSITIVITY RESULT: 15.8 NG/L — SIGNIFICANT CHANGE UP
WBC # BLD: 4.93 K/UL — SIGNIFICANT CHANGE UP (ref 3.8–10.5)
WBC # FLD AUTO: 4.93 K/UL — SIGNIFICANT CHANGE UP (ref 3.8–10.5)

## 2024-03-26 PROCEDURE — 82962 GLUCOSE BLOOD TEST: CPT

## 2024-03-26 PROCEDURE — 85025 COMPLETE CBC W/AUTO DIFF WBC: CPT

## 2024-03-26 PROCEDURE — 80053 COMPREHEN METABOLIC PANEL: CPT

## 2024-03-26 PROCEDURE — 93010 ELECTROCARDIOGRAM REPORT: CPT

## 2024-03-26 PROCEDURE — 83735 ASSAY OF MAGNESIUM: CPT

## 2024-03-26 PROCEDURE — 83690 ASSAY OF LIPASE: CPT

## 2024-03-26 PROCEDURE — 84484 ASSAY OF TROPONIN QUANT: CPT

## 2024-03-26 PROCEDURE — 99283 EMERGENCY DEPT VISIT LOW MDM: CPT

## 2024-03-26 PROCEDURE — 93005 ELECTROCARDIOGRAM TRACING: CPT

## 2024-03-26 PROCEDURE — 82550 ASSAY OF CK (CPK): CPT

## 2024-03-26 PROCEDURE — 36415 COLL VENOUS BLD VENIPUNCTURE: CPT

## 2024-03-26 PROCEDURE — 99285 EMERGENCY DEPT VISIT HI MDM: CPT

## 2024-03-26 NOTE — ED PROVIDER NOTE - CARE PROVIDER_API CALL
Theo Stroud  Neurology  [address], [address]  [place] [address]  Phone: [phone]  Fax: [phone]  Follow Up Time:     Makayla Wang  Internal Medicine  [address] [place] [address]  Phone: [phone]  Fax: [phone]  Follow Up Time:     Bear Vila  Cardiovascular Disease  [address] [place] [address]  Phone: [phone]  Fax: [phone]  Follow Up Time:

## 2024-03-26 NOTE — ED PROVIDER NOTE - NSFOLLOWUPINSTRUCTIONS_ED_ALL_ED_FT
1 Follow-up with your Primary Medical doctor. Call today / next business day for prompt follow-up.  2. Return to Emergency room for any worsening or persistent pain, weakness, fever, or any other concerning symptoms.  3. See attached instruction sheets for additional information, including information regarding signs and symptoms to look out for, reasons to seek immediate care and other important instructions.  4. Follow-up with neurology, call today /tomorrow to arrange prompt follow-up  5.  Follow-up with cardiology, call today/tomorrow for prompt follow-up  6.  As we discussed you will discontinue your Plavix, however you will start aspirin 325 mg once daily for 2 weeks.  After that you can reduce it to 81 mg daily.

## 2024-03-26 NOTE — ED ADULT NURSE NOTE - NSFALLHARMRISKINTERV_ED_ALL_ED

## 2024-03-26 NOTE — ED PROVIDER NOTE - CARE PROVIDERS DIRECT ADDRESSES
,DirectAddress_Unknown,[email or web address]

## 2024-03-26 NOTE — ED ADULT TRIAGE NOTE - CHIEF COMPLAINT QUOTE
Patient was here for a stroke last week. discharged on sunday. patient started new medications upon discharge. patient states she is having R arm cramping, Tingeling all over the body about 20 mins after taking medicaion. medication taken at 10 am. patient started lipitor and clopidogerel.

## 2024-03-26 NOTE — ED PROVIDER NOTE - PATIENT PORTAL LINK FT
You can access the FollowMyHealth Patient Portal offered by [facility] by registering at the following website: [email or web address]. By joining [employer]’s FollowMyHealth portal, you will also be able to view your health information using other applications (apps) compatible with our system.

## 2024-03-26 NOTE — ED ADULT NURSE NOTE - OBJECTIVE STATEMENT
Pt is AOX4, came to the ED with c/o r arm tingling. Pt states she felt R arm tingling, Pt denies R arm numbness/tingling at this time. denies CP/SOB/HA. Denies n/v/d. Denies dizziness when ambulating. Pt is able to follow commands and steady gait observed. PERRLA +. No difficulty swallowing. Pt has hx stroke. Pending lab results. care ongoing.

## 2024-03-26 NOTE — ED PROVIDER NOTE - CLINICAL SUMMARY MEDICAL DECISION MAKING FREE TEXT BOX
pt with likely brief episode of anxiety attack.  unlikely that this a  reaction to plavix, nonetheless will check cpk, trop and elecotrolytes. lab results, re eval and dispo pending at time of sign out.

## 2024-03-26 NOTE — ED PROVIDER NOTE - CARE PLAN
Principal Discharge DX:	Anxiety attack   1 Principal Discharge DX:	Anxiety attack  Secondary Diagnosis:	Paresthesia

## 2024-03-26 NOTE — ED ADULT TRIAGE NOTE - NSWEIGHTCALCTOOLDRUG_GEN_A_CORE
[FreeTextEntry1] : 87 yo with high systolic  BP,will add Amlodipine 2,5 mg with caution regarding leg edema.\par Furosemide 2,5 mg twice a/week.\par foot/leg hygiene,elevate legs\par will f/u.
 used

## 2024-03-26 NOTE — ED PROVIDER NOTE - OBJECTIVE STATEMENT
Patient states that she was discharged on Sunday after workup and treatment for acute stroke.  Patient states she was taking clopidogrel for the first time this morning and states around 20 minutes after she started to feel unusual tingling in bilateral arms right greater than left tightness feeling in the shoulders and total body feeling of being unwell and feeling nervous and anxious.  Patient states that symptoms slowly subsided on their own.  Patient denies any chest pain, shortness of breath or sweating during this episode.  Patient thinks that the medication she she took is too strong for her.

## 2024-03-26 NOTE — ED PROVIDER NOTE - PROVIDER TOKENS
PROVIDER:[TOKEN:[[id number]]],PROVIDER:[TOKEN:[[id number]]],PROVIDER:[TOKEN:[[id number]]]

## 2024-03-28 ENCOUNTER — APPOINTMENT (OUTPATIENT)
Dept: INTERNAL MEDICINE | Facility: CLINIC | Age: 89
End: 2024-03-28
Payer: MEDICARE

## 2024-03-28 VITALS
HEART RATE: 58 BPM | BODY MASS INDEX: 20.21 KG/M2 | OXYGEN SATURATION: 98 % | HEIGHT: 59 IN | WEIGHT: 100.25 LBS | DIASTOLIC BLOOD PRESSURE: 68 MMHG | RESPIRATION RATE: 16 BRPM | TEMPERATURE: 97.6 F | SYSTOLIC BLOOD PRESSURE: 142 MMHG

## 2024-03-28 DIAGNOSIS — E78.1 PURE HYPERGLYCERIDEMIA: ICD-10-CM

## 2024-03-28 DIAGNOSIS — R63.4 ABNORMAL WEIGHT LOSS: ICD-10-CM

## 2024-03-28 PROCEDURE — 99214 OFFICE O/P EST MOD 30 MIN: CPT

## 2024-03-28 NOTE — ASSESSMENT
[FreeTextEntry1] : CVA patient with neuro follow-up continue aspirin 325 mg high-dose Lipitor follow-up with neurology Patient with follow-up with cardiology next week Patient with significant weight loss 20 pounds in the last year elevated lipase we will check repeat today and get a CAT scan of the abdomen pelvis patient with follow-up with GI

## 2024-03-28 NOTE — HISTORY OF PRESENT ILLNESS
[Post-hospitalization from ___ Hospital] : Post-hospitalization from [unfilled] Hospital [Admitted on: ___] : The patient was admitted on [unfilled] [Discharged on ___] : discharged on [unfilled] [FreeTextEntry2] : 88-year-old past medical history hyperlipidemia presented with acute CVA treated in the ER Patient sent home on high-dose Lipitor aspirin Plavix Day after patient had a questionable allergic reaction to Plavix was told to increase aspirin to 325 daily today feeling well

## 2024-03-29 ENCOUNTER — APPOINTMENT (OUTPATIENT)
Dept: CARDIOLOGY | Facility: CLINIC | Age: 89
End: 2024-03-29
Payer: MEDICARE

## 2024-03-29 ENCOUNTER — NON-APPOINTMENT (OUTPATIENT)
Age: 89
End: 2024-03-29

## 2024-03-29 VITALS
DIASTOLIC BLOOD PRESSURE: 80 MMHG | OXYGEN SATURATION: 98 % | HEIGHT: 59 IN | HEART RATE: 64 BPM | BODY MASS INDEX: 20.36 KG/M2 | SYSTOLIC BLOOD PRESSURE: 161 MMHG | WEIGHT: 101 LBS

## 2024-03-29 VITALS — DIASTOLIC BLOOD PRESSURE: 70 MMHG | SYSTOLIC BLOOD PRESSURE: 130 MMHG

## 2024-03-29 LAB
ALBUMIN SERPL ELPH-MCNC: 4.2 G/DL
ALP BLD-CCNC: 78 U/L
ALT SERPL-CCNC: 57 U/L
AMYLASE/CREAT SERPL: 92 U/L
ANION GAP SERPL CALC-SCNC: 10 MMOL/L
AST SERPL-CCNC: 53 U/L
BILIRUB SERPL-MCNC: 0.4 MG/DL
BUN SERPL-MCNC: 17 MG/DL
CALCIUM SERPL-MCNC: 9.7 MG/DL
CHLORIDE SERPL-SCNC: 98 MMOL/L
CO2 SERPL-SCNC: 28 MMOL/L
CREAT SERPL-MCNC: 0.65 MG/DL
EGFR: 85 ML/MIN/1.73M2
GLUCOSE SERPL-MCNC: 79 MG/DL
HCT VFR BLD CALC: 44.1 %
HGB BLD-MCNC: 14.5 G/DL
LPL SERPL-CCNC: 114 U/L
MCHC RBC-ENTMCNC: 31.6 PG
MCHC RBC-ENTMCNC: 32.9 GM/DL
MCV RBC AUTO: 96.1 FL
PLATELET # BLD AUTO: 357 K/UL
POTASSIUM SERPL-SCNC: 5.2 MMOL/L
PROT SERPL-MCNC: 6.6 G/DL
RBC # BLD: 4.59 M/UL
RBC # FLD: 13.9 %
SODIUM SERPL-SCNC: 136 MMOL/L
WBC # FLD AUTO: 4.82 K/UL

## 2024-03-29 PROCEDURE — 93000 ELECTROCARDIOGRAM COMPLETE: CPT

## 2024-03-29 PROCEDURE — 99214 OFFICE O/P EST MOD 30 MIN: CPT

## 2024-03-29 RX ORDER — FAMOTIDINE 10 MG/1
10 TABLET, FILM COATED ORAL
Qty: 60 | Refills: 2 | Status: COMPLETED | COMMUNITY
Start: 2023-04-28 | End: 2024-03-29

## 2024-03-29 RX ORDER — ESTRADIOL 10 UG/1
10 TABLET VAGINAL
Qty: 24 | Refills: 2 | Status: ACTIVE | COMMUNITY
Start: 2019-01-09 | End: 1900-01-01

## 2024-03-29 RX ORDER — ROSUVASTATIN CALCIUM 5 MG/1
5 TABLET, FILM COATED ORAL
Qty: 90 | Refills: 3 | Status: COMPLETED | COMMUNITY
Start: 2022-10-17 | End: 2024-03-29

## 2024-03-29 NOTE — PHYSICAL EXAM
[Well Developed] : well developed [No Acute Distress] : no acute distress [Normal Conjunctiva] : normal conjunctiva [No Carotid Bruit] : no carotid bruit [Normal S1, S2] : normal S1, S2 [No Rub] : no rub [No Gallop] : no gallop [Clear Lung Fields] : clear lung fields [No Respiratory Distress] : no respiratory distress  [Soft] : abdomen soft [Non Tender] : non-tender [Normal Gait] : normal gait [No Edema] : no edema [No Rash] : no rash [Moves all extremities] : moves all extremities [Alert and Oriented] : alert and oriented [de-identified] : No JVD is appreciated at a 45 degree angle [de-identified] : I/VI systolic murmur throughout precordium

## 2024-03-29 NOTE — CARDIOLOGY SUMMARY
[de-identified] : 1/3/24 -sinus rhythm at a rate of 70 bpm.  3 supraventricular premature contractions.  Nonspecific RSR' pattern in lead V2.  Nonspecific repolarization changes. 3/29/24 sinus, LAE, nonspecific repolarization changes

## 2024-03-29 NOTE — HISTORY OF PRESENT ILLNESS
[FreeTextEntry1] : Mrs. Love Pacheco presented to the office today for follow-up cardiac evaluation after presenting to the Emergency Department at [facility] on 3/20/23 for right sided weakness and a slurred speech. Her  stated they were sitting at the kitchen table, and he noticed she was staring out in space and unable to respond.   She was discharged on 3/24/24 on Plavix 75mg QD, ASA 81mg QD . and atorvastatin 80mg QD. On 3/26/24, she returned to the Emergency Department complaining of tingling all over her body after taking Plavix twenty minutes later. which lasted all day. At that point, the Plavix was discontinued, and ASA was increased to 325mg QD.  Dr Vila initially evaluated the patient in the office on 9/1/2021 in cardiology consultation.  She was referred here at that time by Dr. Wang for further evaluation of electrocardiographic abnormalities, palpitations, and dyspnea.  He has also taken care of her .  He last evaluated the patient in the office on 1/3/24.  The patient is an 88-year-old female with a history of supraventricular ectopy, palpitations, dyspnea, mild aortic insufficiency, mild carotid atherosclerosis, a dyslipidemia, celiac disease, a hepatic cyst, a right kidney cyst, a cystocele, left thyroid nodules, osteoporosis, urinary stress incontinence (pessary), a uterine polyp, tonsillectomy, and a left femur stress hairline fracture (thought to be related to therapy with Fosamax or Prolia - status post ORIF 7/18/2018).  The patient awakened at approximately 6:00 AM on 12/25/2023, promptly got out of bed and went into the kitchen.  While standing at the sink shortly after having come into the kitchen, the patient experienced a vague sensation in her head, quickly progressing to a syncopal episode.  She fell to the floor, with most of her body landing on a carpeted section, however, she states that her head hit the ceramic floor.  She quickly regained consciousness, however, she felt too weak to stand up on her own.  Her  called the fire department and assisted the patient up.  She was brought to the emergency department at [facility].  A CT scan of the head and neck performed on 12/25/2023 in the hospital did not reveal any acute findings.  Carotid Doppler study performed in the hospital on 12/25/2023 did not reveal any significant stenoses.  The patient was monitored in the hospital overnight, without any significant arrhythmias detected.  Echocardiography performed in the hospital on 12/26/2023 revealed normal cardiac chamber sizes with normal left ventricular wall thickness and wall motion (ejection fraction 57%).  Mild aortic insufficiency was identified.  The patient was discharged home on 12/26/2023.  Since coming home from the hospital, the patient has not experienced recurrence of syncope, nor has she experienced any episodes of presyncope until this recent admission for a CVA in March 2024. Since discharge from the hospital, she has not experienced chest discomfort in association with her activities.  She has not noted orthopnea, paroxysmal nocturnal dyspnea, or lower extremity edema.  She has not experienced any episodes of palpitations.  The patient had her  drive her to the emergency department in [facility] early in the morning on 5/12/2023 for further evaluation of palpitations that she was experiencing that night, described as a "pounding" sensation in her chest.  She was noted to be exhibiting sinus rhythm with supraventricular ectopy in the emergency department.  Serum troponin levels were negative for evidence of an acute coronary syndrome.  The patient's symptoms were attributed to stress and anxiety, noting that she reported being under considerable stress related to her son having bipolar disorder and having lost his job.  She was released from the emergency department after being monitored for several hours.  She has not experienced recurrence of palpitations since that date.  As far as risk factors for coronary artery disease are concerned, the patient has a dyslipidemia.  She does not have a history of diabetes or hypertension.  She denies a history of cigarette smoking.  She describes a family history of coronary disease, stating that her father suffered "a heart attack" in his 60's.  Laboratory studies performed on 8/16/2023 (on Crestor 5 mg daily) revealed cholesterol 171, triglycerides 56, HDL 68, and calculated LDL 92.  The liver chemistries were normal.  The BUN and creatinine were 10 and 0.69, respectively.  The potassium level was 4.3.  The glucose level was 80 and the hemoglobin A1c level was 5.4%.  The TSH level was 2.93.  The vitamin D level was 63.6.  Exercise stress testing performed on 9/28/2021 was positive for electrocardiographic evidence of myocardial ischemia without the inducement of cardiac symptoms.  The patient was referred for nuclear stress testing for further evaluation.  Nuclear stress testing performed on 10/19/2021 was negative for the inducement of cardiac symptoms, however, up to 1 mm of horizontal ST segment depression developed in the lateral leads.  The cardiac imaging portion of the study revealed normal left ventricular myocardial perfusion and systolic function.  Echocardiography performed as an inpatient at [facility] (admitted for syncope) on 12/26/2023 revealed normal cardiac chamber sizes with normal left ventricular wall thickness and wall motion.  Left ventricular systolic function was normal, with a calculated ejection fraction of 57%.  Mild aortic insufficiency was demonstrated.  Carotid artery Doppler testing performed on 9/13/2021 revealed mild plaque formation involving the right bulbar region and the proximal portion of the right internal carotid artery.  Carotid artery Doppler testing most recently performed as an inpatient at [facility] (admitted for syncope) on 12/25/2023 did not reveal any significant stenoses.

## 2024-03-29 NOTE — DISCUSSION/SUMMARY
[EKG obtained to assist in diagnosis and management of assessed problem(s)] : EKG obtained to assist in diagnosis and management of assessed problem(s) [FreeTextEntry1] : Mrs. Love Pacheco presented to the office today for follow-up cardiac evaluation after presenting to the Emergency Department at [facility] on 3/20/23 for right sided weakness and a slurred speech. Her  stated they were sitting at the kitchen table, and he noticed she was staring out in space and unable to respond.  On 3/26/24, she returned to the Emergency Department complaining of tingling all over her body after taking Plavix twenty minutes later. which lasted all day. At that point, the Plavix was discontinued, and ASA was increased to 325mg QD.   Mrs. Pacheco experienced a syncopal episode while standing at the kitchen sink on 12/25/2023, shortly having arisen from bed that morning.  This episode was preceded by a vaguely-describes sensation in her head, quickly preceding to syncope.  She was brought by ambulance to [facility], where a CT scan of the head and neck as well as a carotid artery Doppler study performed that day were unrevealing.  An echocardiogram performed the following day revealed normal left ventricular systolic function with mild aortic insufficiency.  The patient was discharged from the hospital on 12/26/2023 and has not experienced recurrence of syncope since coming home from the hospital, nor has she experienced any episodes of presyncope.  Her cardiac examination today is remarkable for a faint systolic murmur heard throughout the precordium, unchanged from her previous visit with Dr Vila.  Her blood pressure reading today was mildly elevated initially but dropped to 130/70 during the exam Her electrocardiogram today reveals sinus rhythm with nonspecific repolarization changes.  The etiology of the syncopal episode that the patient experienced on 12/25/2023 is uncertain.  Although it is possible that this episode was vagally-mediated and/or orthostatic in nature, the possibility of of an arrhythmic etiology needs to be considered in this patient's age group.  For further evaluation at that time, Dr Vila refered the patient for a 2-week extended Holter monitor study.  She will make arrangements to have this study performed through our office, and he will telephone her to discuss the findings, once the study has been completed.  If this study is unrevealing, consideration will be given toward insertion of an implantable loop recording device, especially if the patient should experience recurrence of syncope or any episodes of presyncope.  At the last office visit, Dr Vila reviewed the findings of the nuclear stress study of 10/19/2021, the inpatient echocardiogram of 12/26/2023, and the inpatient carotid artery Doppler study of 12/25/2023 in detail with the patient. An inpatient echocardiogram performed on 3/23/24 revealed an ejection fraction of 55-60%, a trace to small pericardial effusion and normal left ventricular diastolic function. She will continue ASA 325mg QD, Atorvastatin 80mg QD along with metoprolol Succinate 25mg QD. She will follow up with Dr Vila in June 2024. Further recommendations will depend on her clinical course.

## 2024-04-02 ENCOUNTER — OUTPATIENT (OUTPATIENT)
Dept: OUTPATIENT SERVICES | Facility: HOSPITAL | Age: 89
LOS: 1 days | End: 2024-04-02
Payer: MEDICARE

## 2024-04-02 ENCOUNTER — APPOINTMENT (OUTPATIENT)
Dept: CT IMAGING | Facility: CLINIC | Age: 89
End: 2024-04-02
Payer: MEDICARE

## 2024-04-02 DIAGNOSIS — R63.4 ABNORMAL WEIGHT LOSS: ICD-10-CM

## 2024-04-02 DIAGNOSIS — Z41.9 ENCOUNTER FOR PROCEDURE FOR PURPOSES OTHER THAN REMEDYING HEALTH STATE, UNSPECIFIED: Chronic | ICD-10-CM

## 2024-04-02 DIAGNOSIS — Z98.890 OTHER SPECIFIED POSTPROCEDURAL STATES: Chronic | ICD-10-CM

## 2024-04-02 PROCEDURE — 74177 CT ABD & PELVIS W/CONTRAST: CPT | Mod: 26,MH

## 2024-04-02 PROCEDURE — 74177 CT ABD & PELVIS W/CONTRAST: CPT

## 2024-04-03 ENCOUNTER — APPOINTMENT (OUTPATIENT)
Dept: INTERNAL MEDICINE | Facility: CLINIC | Age: 89
End: 2024-04-03
Payer: MEDICARE

## 2024-04-03 VITALS
HEART RATE: 73 BPM | RESPIRATION RATE: 16 BRPM | SYSTOLIC BLOOD PRESSURE: 126 MMHG | DIASTOLIC BLOOD PRESSURE: 64 MMHG | TEMPERATURE: 97.6 F | WEIGHT: 101 LBS | HEIGHT: 59 IN | BODY MASS INDEX: 20.36 KG/M2 | OXYGEN SATURATION: 96 %

## 2024-04-03 DIAGNOSIS — E78.00 PURE HYPERCHOLESTEROLEMIA, UNSPECIFIED: ICD-10-CM

## 2024-04-03 DIAGNOSIS — K76.89 OTHER SPECIFIED DISEASES OF LIVER: ICD-10-CM

## 2024-04-03 PROCEDURE — 99214 OFFICE O/P EST MOD 30 MIN: CPT

## 2024-04-03 PROCEDURE — G2211 COMPLEX E/M VISIT ADD ON: CPT

## 2024-04-03 RX ORDER — PANTOPRAZOLE 40 MG/1
40 TABLET, DELAYED RELEASE ORAL DAILY
Qty: 60 | Refills: 4 | Status: ACTIVE | COMMUNITY
Start: 2023-05-17 | End: 1900-01-01

## 2024-04-03 NOTE — HISTORY OF PRESENT ILLNESS
[FreeTextEntry1] : elevated LFT's and h/o stroke [de-identified] : Pt is here with her . She was hospitalized for a stroke. Her LFT's were improved.  She has extreme stress with children. Her 59 y/o son lost his job with covid and has been struggling. He has bipolar and also his wife.  also his daughter has severe headaches. Other daughter has emotional problems.

## 2024-04-03 NOTE — ASSESSMENT
[FreeTextEntry1] : CVA presented with unable to speak and weakness of right arm went to ER within an hour  weight loss no cause could be related to stress has GI appt  hyperlipidemia atorvastatin was increased  elevated LFT's improved since being hospitalized repeat labs in 2-3 weeks

## 2024-04-12 ENCOUNTER — EMERGENCY (EMERGENCY)
Facility: HOSPITAL | Age: 89
LOS: 1 days | Discharge: HOME | End: 2024-04-12
Attending: EMERGENCY MEDICINE | Admitting: EMERGENCY MEDICINE
Payer: MEDICARE

## 2024-04-12 VITALS
OXYGEN SATURATION: 98 % | RESPIRATION RATE: 18 BRPM | WEIGHT: 106.04 LBS | HEART RATE: 68 BPM | DIASTOLIC BLOOD PRESSURE: 80 MMHG | SYSTOLIC BLOOD PRESSURE: 164 MMHG | HEIGHT: 58 IN | TEMPERATURE: 98 F

## 2024-04-12 DIAGNOSIS — Z41.9 ENCOUNTER FOR PROCEDURE FOR PURPOSES OTHER THAN REMEDYING HEALTH STATE, UNSPECIFIED: Chronic | ICD-10-CM

## 2024-04-12 DIAGNOSIS — Z98.890 OTHER SPECIFIED POSTPROCEDURAL STATES: Chronic | ICD-10-CM

## 2024-04-12 PROCEDURE — 99285 EMERGENCY DEPT VISIT HI MDM: CPT

## 2024-04-12 RX ORDER — ONDANSETRON 8 MG/1
4 TABLET, FILM COATED ORAL ONCE
Refills: 0 | Status: COMPLETED | OUTPATIENT
Start: 2024-04-12 | End: 2024-04-12

## 2024-04-12 RX ORDER — SODIUM CHLORIDE 9 MG/ML
1000 INJECTION INTRAMUSCULAR; INTRAVENOUS; SUBCUTANEOUS
Refills: 0 | Status: DISCONTINUED | OUTPATIENT
Start: 2024-04-12 | End: 2024-04-16

## 2024-04-12 RX ORDER — ACETAMINOPHEN 500 MG
725 TABLET ORAL ONCE
Refills: 0 | Status: COMPLETED | OUTPATIENT
Start: 2024-04-12 | End: 2024-04-12

## 2024-04-12 RX ORDER — MORPHINE SULFATE 50 MG/1
2 CAPSULE, EXTENDED RELEASE ORAL ONCE
Refills: 0 | Status: DISCONTINUED | OUTPATIENT
Start: 2024-04-12 | End: 2024-04-12

## 2024-04-13 LAB
ALBUMIN SERPL ELPH-MCNC: 3.2 G/DL — LOW (ref 3.3–5)
ALP SERPL-CCNC: 141 U/L — HIGH (ref 40–120)
ALT FLD-CCNC: 275 U/L — HIGH (ref 12–78)
ANION GAP SERPL CALC-SCNC: 7 MMOL/L — SIGNIFICANT CHANGE UP (ref 5–17)
APPEARANCE UR: CLEAR — SIGNIFICANT CHANGE UP
AST SERPL-CCNC: 284 U/L — HIGH (ref 15–37)
BASOPHILS # BLD AUTO: 0.04 K/UL — SIGNIFICANT CHANGE UP (ref 0–0.2)
BASOPHILS NFR BLD AUTO: 0.6 % — SIGNIFICANT CHANGE UP (ref 0–2)
BILIRUB SERPL-MCNC: 0.5 MG/DL — SIGNIFICANT CHANGE UP (ref 0.2–1.2)
BILIRUB UR-MCNC: NEGATIVE — SIGNIFICANT CHANGE UP
BUN SERPL-MCNC: 8 MG/DL — SIGNIFICANT CHANGE UP (ref 7–23)
CALCIUM SERPL-MCNC: 9.2 MG/DL — SIGNIFICANT CHANGE UP (ref 8.5–10.1)
CHLORIDE SERPL-SCNC: 100 MMOL/L — SIGNIFICANT CHANGE UP (ref 96–108)
CO2 SERPL-SCNC: 28 MMOL/L — SIGNIFICANT CHANGE UP (ref 22–31)
COLOR SPEC: YELLOW — SIGNIFICANT CHANGE UP
CREAT SERPL-MCNC: 0.48 MG/DL — LOW (ref 0.5–1.3)
DIFF PNL FLD: NEGATIVE — SIGNIFICANT CHANGE UP
EGFR: 92 ML/MIN/1.73M2 — SIGNIFICANT CHANGE UP
EOSINOPHIL # BLD AUTO: 0.47 K/UL — SIGNIFICANT CHANGE UP (ref 0–0.5)
EOSINOPHIL NFR BLD AUTO: 6.9 % — HIGH (ref 0–6)
GLUCOSE SERPL-MCNC: 109 MG/DL — HIGH (ref 70–99)
GLUCOSE UR QL: NEGATIVE MG/DL — SIGNIFICANT CHANGE UP
HCT VFR BLD CALC: 40.2 % — SIGNIFICANT CHANGE UP (ref 34.5–45)
HGB BLD-MCNC: 13.8 G/DL — SIGNIFICANT CHANGE UP (ref 11.5–15.5)
IMM GRANULOCYTES NFR BLD AUTO: 0.3 % — SIGNIFICANT CHANGE UP (ref 0–0.9)
KETONES UR-MCNC: NEGATIVE MG/DL — SIGNIFICANT CHANGE UP
LEUKOCYTE ESTERASE UR-ACNC: NEGATIVE — SIGNIFICANT CHANGE UP
LIDOCAIN IGE QN: 72 U/L — SIGNIFICANT CHANGE UP (ref 13–75)
LYMPHOCYTES # BLD AUTO: 0.4 K/UL — LOW (ref 1–3.3)
LYMPHOCYTES # BLD AUTO: 5.9 % — LOW (ref 13–44)
MCHC RBC-ENTMCNC: 31.4 PG — SIGNIFICANT CHANGE UP (ref 27–34)
MCHC RBC-ENTMCNC: 34.3 GM/DL — SIGNIFICANT CHANGE UP (ref 32–36)
MCV RBC AUTO: 91.4 FL — SIGNIFICANT CHANGE UP (ref 80–100)
MONOCYTES # BLD AUTO: 0.87 K/UL — SIGNIFICANT CHANGE UP (ref 0–0.9)
MONOCYTES NFR BLD AUTO: 12.8 % — SIGNIFICANT CHANGE UP (ref 2–14)
NEUTROPHILS # BLD AUTO: 5 K/UL — SIGNIFICANT CHANGE UP (ref 1.8–7.4)
NEUTROPHILS NFR BLD AUTO: 73.5 % — SIGNIFICANT CHANGE UP (ref 43–77)
NITRITE UR-MCNC: NEGATIVE — SIGNIFICANT CHANGE UP
NRBC # BLD: 0 /100 WBCS — SIGNIFICANT CHANGE UP (ref 0–0)
PH UR: 7 — SIGNIFICANT CHANGE UP (ref 5–8)
PLATELET # BLD AUTO: 280 K/UL — SIGNIFICANT CHANGE UP (ref 150–400)
POTASSIUM SERPL-MCNC: 3.8 MMOL/L — SIGNIFICANT CHANGE UP (ref 3.5–5.3)
POTASSIUM SERPL-SCNC: 3.8 MMOL/L — SIGNIFICANT CHANGE UP (ref 3.5–5.3)
PROT SERPL-MCNC: 6.3 G/DL — SIGNIFICANT CHANGE UP (ref 6–8.3)
PROT UR-MCNC: NEGATIVE MG/DL — SIGNIFICANT CHANGE UP
RBC # BLD: 4.4 M/UL — SIGNIFICANT CHANGE UP (ref 3.8–5.2)
RBC # FLD: 13.1 % — SIGNIFICANT CHANGE UP (ref 10.3–14.5)
SODIUM SERPL-SCNC: 135 MMOL/L — SIGNIFICANT CHANGE UP (ref 135–145)
SP GR SPEC: 1.04 — HIGH (ref 1–1.03)
TROPONIN I, HIGH SENSITIVITY RESULT: 9.5 NG/L — SIGNIFICANT CHANGE UP
UROBILINOGEN FLD QL: 1 MG/DL — SIGNIFICANT CHANGE UP (ref 0.2–1)
WBC # BLD: 6.8 K/UL — SIGNIFICANT CHANGE UP (ref 3.8–10.5)
WBC # FLD AUTO: 6.8 K/UL — SIGNIFICANT CHANGE UP (ref 3.8–10.5)

## 2024-04-13 PROCEDURE — 83690 ASSAY OF LIPASE: CPT

## 2024-04-13 PROCEDURE — 93005 ELECTROCARDIOGRAM TRACING: CPT

## 2024-04-13 PROCEDURE — 93010 ELECTROCARDIOGRAM REPORT: CPT

## 2024-04-13 PROCEDURE — 71045 X-RAY EXAM CHEST 1 VIEW: CPT

## 2024-04-13 PROCEDURE — 74177 CT ABD & PELVIS W/CONTRAST: CPT | Mod: 26,MC

## 2024-04-13 PROCEDURE — 96375 TX/PRO/DX INJ NEW DRUG ADDON: CPT | Mod: XU

## 2024-04-13 PROCEDURE — 99285 EMERGENCY DEPT VISIT HI MDM: CPT | Mod: 25

## 2024-04-13 PROCEDURE — 71045 X-RAY EXAM CHEST 1 VIEW: CPT | Mod: 26

## 2024-04-13 PROCEDURE — 76705 ECHO EXAM OF ABDOMEN: CPT

## 2024-04-13 PROCEDURE — 76705 ECHO EXAM OF ABDOMEN: CPT | Mod: 26

## 2024-04-13 PROCEDURE — 36415 COLL VENOUS BLD VENIPUNCTURE: CPT

## 2024-04-13 PROCEDURE — 80053 COMPREHEN METABOLIC PANEL: CPT

## 2024-04-13 PROCEDURE — 74177 CT ABD & PELVIS W/CONTRAST: CPT | Mod: MC

## 2024-04-13 PROCEDURE — 96374 THER/PROPH/DIAG INJ IV PUSH: CPT | Mod: XU

## 2024-04-13 PROCEDURE — 84484 ASSAY OF TROPONIN QUANT: CPT

## 2024-04-13 PROCEDURE — 81003 URINALYSIS AUTO W/O SCOPE: CPT

## 2024-04-13 PROCEDURE — 85025 COMPLETE CBC W/AUTO DIFF WBC: CPT

## 2024-04-13 RX ADMIN — Medication 290 MILLIGRAM(S): at 00:29

## 2024-04-13 RX ADMIN — SODIUM CHLORIDE 150 MILLILITER(S): 9 INJECTION INTRAMUSCULAR; INTRAVENOUS; SUBCUTANEOUS at 00:30

## 2024-04-13 RX ADMIN — ONDANSETRON 4 MILLIGRAM(S): 8 TABLET, FILM COATED ORAL at 00:29

## 2024-04-13 NOTE — ED PROVIDER NOTE - PROGRESS NOTE DETAILS
Pt is comfortably sleeping on re-evaluation  CT shows no new findings  RUQ sono shows mild hydroureter. No stone  UA neg  LFTs elevated as compared to March 2024. This may likely be secondary to Lipitor that pt was discharge home with, in the setting of no other significant pathology in the RUQ on both CT and RUQ sono.  Pt is advised to d/c lipitor and f/u with her PCP and neurologist for further recommendations and repeat of her LFTs. Also advised to keep her appt with her GI. Pt given copies of all results  Return precautions discussed.

## 2024-04-13 NOTE — ED PROVIDER NOTE - CLINICAL SUMMARY MEDICAL DECISION MAKING FREE TEXT BOX
87 yo F with epigastric/RUQ pain. Evaluate for gallstones, acute cholecystitis, ACS, UTI, GERD, pancreatitis. Will get labs, EKG, CXR, RUQ sono and CT A/P. Will give IVFs, zofran, pepcid. Will reassess.

## 2024-04-13 NOTE — ED PROVIDER NOTE - PATIENT PORTAL LINK FT
You can access the FollowMyHealth Patient Portal offered by [facility] by registering at the following website: [email or web address]. By joining [facility]’s FollowMyHealth portal, you will also be able to view your health information using other applications (apps) compatible with our system.

## 2024-04-13 NOTE — ED PROVIDER NOTE - OBJECTIVE STATEMENT
89 yo F with hx of GERD, CVA, HTN, presents c/o epigastric/RUQ pain x few weeks but worsening over the past 4-5 days. Denies vomiting, reports nausea. Denies fever/chills. Pt reports she had a CVA in March 2024 but she was doing fine when she was discharged. States she was started on  and lipitor 80mg. States she has a hx of GERD and takes pantoprazole. States she has scheduled an appt with her GI but its on 5/20/24. Report normal BMs, no blood. Denies urinary symptoms. Able to tolerate PO.

## 2024-04-13 NOTE — ED PROVIDER NOTE - CARE PROVIDER_API CALL
Makayla Wang  Internal Medicine  [address] [place] [id number]  Phone: [phone]  Fax: [phone]  Established Patient  Follow Up Time: 1-3 Days

## 2024-04-13 NOTE — ED PROVIDER NOTE - NSFOLLOWUPINSTRUCTIONS_ED_ALL_ED_FT
Follow up with Dr. Wang in 2-3 days for further recommendation on cholesterol management  Please have your doctor repeat your liver function tests to eval for interval change/improvement  STOP taking the Lipitor  Return to the ER if your symptoms worsen, or if you develop vomiting or notice yellowing of the eyes/skin.    Statin Intolerance  Statin intolerance is the inability to take a certain type of cholesterol-lowering medicine (statin) because of unwanted side effects, such as muscle pain. Statins may be prescribed to decrease cholesterol levels and lower the risk for heart attack, heart disease, and stroke. People with statin intolerance may experience muscle pain and cramps (myalgia) that go away when the statin is stopped.    What are the causes?  The cause of this condition is not known.    What increases the risk?  You may be at higher risk for statin intolerance if you:  Take more than one type of cholesterol-lowering medicine at a time.  Need a higher-than-normal dosage of a statin.  Have any of these medical problems:  A history of surgery or trauma.  Kidney, liver, joint, or muscle disease.  A low level of hormones that control how your body uses energy (hypothyroidism).  A lack of vitamin D (deficiency).  Have a family history of muscle aches with or without statin therapy.  Drink a lot of alcohol.  Have any of these characteristics:  Being older than 80 years of age.  Having a body size that is smaller than normal.  Being female.  Being of  descent.  Take certain medicines, including:  Certain medicines for mental illness (antipsychotics).  Some types of antibiotics or antifungals.  Certain medicines used for blood pressure or heart disease.  Medicines that reduce the activity of the body's disease-fighting system (immunosuppressants).  Medicines to treat hepatitis C and HIV or AIDS (human immunodeficiency virus or acquired immunodeficiency syndrome).  Follow an intense exercise program.  Drink a lot of grapefruit juice.  What are the signs or symptoms?  Symptoms of statin intolerance include:  General muscle aches, or myalgia. This may feel similar to muscle aches that are caused by the flu.  Muscle pain, tenderness, cramps, or weakness (myositis).  Severe muscle pain, weakness, and raised blood CK levels (rhabdomyolysis).  Symptoms usually go away when the statin is stopped.    Rarely, liver damage can also occur, which can cause:  Dark yellow or brown urine.  Light-colored stool (feces).  Loss of appetite.  Pain in the upper right abdomen.  Unusual weakness or fatigue.  Yellowing of the skin or the white parts of the eyes (jaundice).  How is this diagnosed?  This condition is diagnosed based on your symptoms, your medical history, and a physical exam. You may also have blood tests.    How is this treated?  Your health care provider may have you stop taking the statin for a short time to see if your symptoms go away. Then your provider may restart your statin or:  Change you to a different statin.  Lower the dosage of your statin.  Have you take your statin less often.  Change you to another type of cholesterol-lowering medicine.  Stop or change any medicines that might be interfering with your statin.  Limit how much grapefruit juice you drink.  Recommend stopping intense exercise.  In most cases, statin intolerance can be managed, and you can continue to take a cholesterol-lowering medicine.    Follow these instructions at home:  Medicines    Take your statin medicine as told by your health care provider. Do not stop taking the statin unless your health care provider tells you to stop.  Take other over-the-counter and prescription medicines only as told by your health care provider.  Check with your health care provider before taking any new medicines. Certain medicines can increase your risk for statin intolerance.  Lifestyle    If you drink alcohol:  Limit how much you have to:  0–1 drink a day for women who are not pregnant.  0–2 drinks a day for men.  Know how much alcohol is in a drink. In the [place] one drink equals one 12 oz bottle of beer (355 mL), one 5 oz glass of wine (148 mL), or one 1½ oz glass of hard liquor (44 mL).  Do not use any products that contain nicotine or tobacco. These products include cigarettes, chewing tobacco, and vaping devices, such as e-cigarettes. If you need help quitting, ask your health care provider.  General instructions    A plate with examples of foods in a healthy diet.  Have blood tests to check CK levels or liver enzymes as told by your health care provider.  Exercise as directed. Ask your health care provider what exercises are best for you. Do not start a new exercise program before talking about it with your health care provider.  Follow instructions from your health care provider about eating or drinking restrictions. Your health care provider may recommend:  Limiting the amount of grapefruit juice you drink, or not drinking it at all.  Eating a diet that is low in saturated fats and high in fiber.  Maintain a healthy weight with diet and exercise.  Keep all follow-up visits. This is important.  Contact a health care provider if:  You have any symptoms of statin intolerance.  Summary  Statins are important medicines for decreasing your cholesterol, which may reduce your risk for heart attack and stroke.  Some people are not able to continue taking a particular statin because of muscle problems (myalgia) or other side effects.  Myalgia is the most common symptom of statin intolerance. Often, the muscle pain and cramps from myalgia go away when the statin is stopped.  Although rare, liver damage can occur as a result of statin intolerance. You should have routine blood tests to check your liver enzymes.  In most cases, statin intolerance can be managed, and you can continue to take a cholesterol-lowering medicine.  This information is not intended to replace advice given to you by your health care provider. Make sure you discuss any questions you have with your health care provider.

## 2024-04-13 NOTE — ED PROVIDER NOTE - NS_EDPROVIDERDISPOUSERTYPE_ED_A_ED
Attending Attestation (For Attendings USE Only)... Perilesional Excision Additional Text (Leave Blank If You Do Not Want): The margin was drawn around the clinically apparent lesion. Incisions were then made along these lines to the appropriate tissue plane and the lesion was extirpated.

## 2024-04-17 ENCOUNTER — APPOINTMENT (OUTPATIENT)
Dept: CARDIOLOGY | Facility: CLINIC | Age: 89
End: 2024-04-17
Payer: MEDICARE

## 2024-04-17 VITALS
BODY MASS INDEX: 19.76 KG/M2 | WEIGHT: 98 LBS | SYSTOLIC BLOOD PRESSURE: 146 MMHG | DIASTOLIC BLOOD PRESSURE: 73 MMHG | OXYGEN SATURATION: 99 % | HEIGHT: 59 IN | HEART RATE: 60 BPM

## 2024-04-17 DIAGNOSIS — I47.10 SUPRAVENTRICULAR TACHYCARDIA, UNSPECIFIED: ICD-10-CM

## 2024-04-17 PROCEDURE — G2211 COMPLEX E/M VISIT ADD ON: CPT

## 2024-04-17 PROCEDURE — 93000 ELECTROCARDIOGRAM COMPLETE: CPT

## 2024-04-17 PROCEDURE — 99215 OFFICE O/P EST HI 40 MIN: CPT

## 2024-04-17 RX ORDER — ASPIRIN ENTERIC COATED TABLETS 81 MG 81 MG/1
81 TABLET, DELAYED RELEASE ORAL
Refills: 0 | Status: DISCONTINUED | COMMUNITY
End: 2024-04-17

## 2024-04-17 RX ORDER — CLOPIDOGREL BISULFATE 75 MG/1
75 TABLET, FILM COATED ORAL
Refills: 0 | Status: DISCONTINUED | COMMUNITY
End: 2024-04-17

## 2024-04-17 RX ORDER — ROSUVASTATIN CALCIUM 10 MG/1
10 TABLET, FILM COATED ORAL
Qty: 90 | Refills: 3 | Status: ACTIVE | COMMUNITY
Start: 2024-04-17 | End: 1900-01-01

## 2024-04-17 RX ORDER — ATORVASTATIN CALCIUM 80 MG/1
80 TABLET, FILM COATED ORAL
Qty: 90 | Refills: 3 | Status: DISCONTINUED | COMMUNITY
Start: 2024-03-29 | End: 2024-04-17

## 2024-04-17 NOTE — PHYSICAL EXAM
[Well Developed] : well developed [No Acute Distress] : no acute distress [Normal Conjunctiva] : normal conjunctiva [No Carotid Bruit] : no carotid bruit [Normal S1, S2] : normal S1, S2 [No Rub] : no rub [No Gallop] : no gallop [Clear Lung Fields] : clear lung fields [No Respiratory Distress] : no respiratory distress  [Soft] : abdomen soft [Non Tender] : non-tender [Normal Gait] : normal gait [No Edema] : no edema [No Rash] : no rash [Moves all extremities] : moves all extremities [Alert and Oriented] : alert and oriented [de-identified] : No JVD is appreciated at a 45 degree angle [de-identified] : I/VI systolic murmur throughout precordium

## 2024-04-17 NOTE — DISCUSSION/SUMMARY
[FreeTextEntry1] : Mrs. Pacheco was noted by her  on 3/20/2024 to be exhibiting slurred speech and not responding appropriately, for which she was brought to the emergency department at [facility].  A CT scan of the head (stroke protocol) revealed a core infarct with ischemic penumbra in the left MCA territory, for which the patient was treated with TNK.  A CTA scan of the brain revealed occlusion of the left proximal M2 branch with distal reconstitution.  A CTA scan of the neck revealed no significant carotid artery stenoses and patent vertebral arteries.  An MRI scan of the brain revealed a left MCA distribution CVA.  The patient was treated with statin therapy and aspirin.  She experienced a vague sensation which she attributed to Plavix, and the Plavix was discontinued.  She was discharged from the hospital on 3/24/2024.  She has not experienced recurrence of any neurological signs or symptoms since coming home from the hospital, nor does she have any recognized residual neurological deficits related to the CVA.  She has not experienced any signs or symptoms to suggest the presence of an anginal syndrome, congestive heart failure, or a hemodynamically-compromising arrhythmia.  Her cardiac examination today is remarkable for a faint systolic murmur heard throughout the precordium, unchanged from her previous visit with me.  Her blood pressure reading today is satisfactory.  Her electrocardiogram today reveals sinus rhythm with nonspecific repolarization abnormalities, essentially unchanged from her previous office tracing, allowing for lead placement variation.  I have explained to the patient that in her age group, a significant percentage of patients who suffer a CVA have underlying paroxysmal atrial fibrillation as the etiology.  Although cardiac monitoring revealed sinus rhythm throughout the patient's hospitalization during her CVA, I explained to her that it is still possible that she may have paroxysmal atrial fibrillation.  For further evaluation of this possibility, I have recommended that the patient have an implantable loop recorder device inserted to assess for the presence of paroxysmal atrial fibrillation.  She is agreeable, and arrangements will be made for this device to be implanted by the electrophysiologist.  As the patient feels that she was unable to tolerate Lipitor, I am going to switch her back to Crestor, noting that she was previously tolerating Crestor 5 mg daily.  In view of the recent CVA, however, I am going to increase the dosage of Crestor to 10 mg daily.  I have electronically prescribed the higher dosage of Crestor to the patient's pharmacy for her today, and I have asked her to call me if she should have any difficulty tolerating the increased dosage of this medication.  Her liver chemistries will be followed, in view of the patient having recently exhibited elevated AST, ALT, and lipase levels (noting that 2 CT scans of the abdomen performed this month have both been unrevealing.  The patient is going to be consulting with a gastroenterologist regarding her elevated liver chemistries and lipase level.  I have recommended to the patient that she follow-up with her neurologist regarding appropriate dosing of aspirin.  She is presently being managed with 325 mg daily, and I have recommended to her that the dosage be reduced to 81 mg daily, provided the neurologist has no objection.  In view of the echocardiograms performed in the hospital on 3/21/2024 and 3/23/2024 having revealed a suspected pericardial effusion, I am referring the patient for follow-up echocardiography for reassessment.  She is going to make arrangements to have this study performed through our office, and I will telephone her to discuss the findings, once the study has been completed.  I again reassured the patient today that the supraventricular ectopy she has exhibited is most likely benign in nature.  I suspect that the patient's previous episodes of palpitations are benign in nature as well.  If these palpitations should become more frequent, more intense, or of longer duration, consideration will be given toward Zio patch event monitoring or implantation of an implantable loop recorder device for further evaluation. It is possible that the episodes of palpitations that have awakened her from sleep may be triggered by obstructive sleep apnea, however, she states that she experiences similar symptoms while awake, especially when feeling stressed or when she is exposed to particularly hot and humid conditions.  I have reviewed the findings of the nuclear stress study of 10/19/2021, the inpatient echocardiograms of 3/21/2024 and 3/23/2024, and the inpatient carotid artery Doppler study of 12/25/2023 in detail with the patient today.  In addition, I have reviewed the findings of the CTA of the brain, the CTA of the neck, and the MRI of the brain performed during the patient's hospitalization with a CVA in March 2024.  I have asked the patient to call me if she should have any questions or problems pertaining to these matters, and especially if she should experience any concerning symptoms.  I have otherwise asked her to return to the office for follow-up cardiac evaluation in 1-2 months, provided she remains clinically stable in the interim. [EKG obtained to assist in diagnosis and management of assessed problem(s)] : EKG obtained to assist in diagnosis and management of assessed problem(s)
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
## 2024-05-01 LAB
ALBUMIN SERPL ELPH-MCNC: 4 G/DL
ALP BLD-CCNC: 426 U/L
ALT SERPL-CCNC: 95 U/L
AMYLASE/CREAT SERPL: 94 U/L
ANION GAP SERPL CALC-SCNC: 11 MMOL/L
AST SERPL-CCNC: 54 U/L
BASOPHILS # BLD AUTO: 0.16 K/UL
BASOPHILS NFR BLD AUTO: 2.7 %
BILIRUB SERPL-MCNC: 0.8 MG/DL
BUN SERPL-MCNC: 20 MG/DL
CALCIUM SERPL-MCNC: 9.7 MG/DL
CHLORIDE SERPL-SCNC: 94 MMOL/L
CHOLEST SERPL-MCNC: 137 MG/DL
CO2 SERPL-SCNC: 26 MMOL/L
CREAT SERPL-MCNC: 0.74 MG/DL
EGFR: 78 ML/MIN/1.73M2
EOSINOPHIL # BLD AUTO: 0.96 K/UL
EOSINOPHIL NFR BLD AUTO: 16.3 %
ESTIMATED AVERAGE GLUCOSE: 117 MG/DL
FOLATE SERPL-MCNC: 6.5 NG/ML
GLUCOSE SERPL-MCNC: 75 MG/DL
HBA1C MFR BLD HPLC: 5.7 %
HCT VFR BLD CALC: 41 %
HDLC SERPL-MCNC: 52 MG/DL
HGB BLD-MCNC: 13.4 G/DL
IMM GRANULOCYTES NFR BLD AUTO: 0.5 %
LDLC SERPL CALC-MCNC: 75 MG/DL
LPL SERPL-CCNC: 89 U/L
LYMPHOCYTES # BLD AUTO: 1.14 K/UL
LYMPHOCYTES NFR BLD AUTO: 19.3 %
MAN DIFF?: NORMAL
MCHC RBC-ENTMCNC: 30.9 PG
MCHC RBC-ENTMCNC: 32.7 GM/DL
MCV RBC AUTO: 94.5 FL
MONOCYTES # BLD AUTO: 0.76 K/UL
MONOCYTES NFR BLD AUTO: 12.9 %
NEUTROPHILS # BLD AUTO: 2.85 K/UL
NEUTROPHILS NFR BLD AUTO: 48.3 %
NONHDLC SERPL-MCNC: 85 MG/DL
PLATELET # BLD AUTO: 553 K/UL
POTASSIUM SERPL-SCNC: 5.4 MMOL/L
PROT SERPL-MCNC: 6.4 G/DL
RBC # BLD: 4.34 M/UL
RBC # FLD: 13.9 %
SODIUM SERPL-SCNC: 130 MMOL/L
TRIGL SERPL-MCNC: 43 MG/DL
TSH SERPL-ACNC: 2.79 UIU/ML
VIT B12 SERPL-MCNC: 1193 PG/ML
WBC # FLD AUTO: 5.9 K/UL

## 2024-05-06 ENCOUNTER — APPOINTMENT (OUTPATIENT)
Dept: UROGYNECOLOGY | Facility: CLINIC | Age: 89
End: 2024-05-06
Payer: MEDICARE

## 2024-05-06 DIAGNOSIS — N39.3 STRESS INCONTINENCE (FEMALE) (MALE): ICD-10-CM

## 2024-05-06 DIAGNOSIS — N81.4 UTEROVAGINAL PROLAPSE, UNSPECIFIED: ICD-10-CM

## 2024-05-06 PROCEDURE — 99213 OFFICE O/P EST LOW 20 MIN: CPT

## 2024-05-07 LAB
ALP BLD-CCNC: 421 IU/L
ALP BONE CFR SERPL: 35 %
ALP INTEST CFR SERPL: 0 %
ALP LIVER CFR SERPL: 65 %

## 2024-05-08 ENCOUNTER — APPOINTMENT (OUTPATIENT)
Dept: GASTROENTEROLOGY | Facility: CLINIC | Age: 89
End: 2024-05-08
Payer: MEDICARE

## 2024-05-08 VITALS
WEIGHT: 98 LBS | HEIGHT: 59 IN | DIASTOLIC BLOOD PRESSURE: 81 MMHG | BODY MASS INDEX: 19.76 KG/M2 | HEART RATE: 78 BPM | SYSTOLIC BLOOD PRESSURE: 135 MMHG | OXYGEN SATURATION: 99 %

## 2024-05-08 LAB
ALBUMIN SERPL ELPH-MCNC: 4 G/DL
ALP BLD-CCNC: 249 U/L
ALT SERPL-CCNC: 40 U/L
AMYLASE/CREAT SERPL: 94 U/L
ANION GAP SERPL CALC-SCNC: 12 MMOL/L
AST SERPL-CCNC: 36 U/L
BASOPHILS # BLD AUTO: 0.11 K/UL
BASOPHILS NFR BLD AUTO: 2.4 %
BILIRUB SERPL-MCNC: 0.5 MG/DL
BUN SERPL-MCNC: 13 MG/DL
CALCIUM SERPL-MCNC: 9.8 MG/DL
CHLORIDE SERPL-SCNC: 96 MMOL/L
CO2 SERPL-SCNC: 28 MMOL/L
CREAT SERPL-MCNC: 0.75 MG/DL
EGFR: 77 ML/MIN/1.73M2
EOSINOPHIL # BLD AUTO: 0.85 K/UL
EOSINOPHIL NFR BLD AUTO: 18.2 %
GGT SERPL-CCNC: 128 U/L
GLUCOSE SERPL-MCNC: 68 MG/DL
HCT VFR BLD CALC: 39.8 %
HGB BLD-MCNC: 13.5 G/DL
IMM GRANULOCYTES NFR BLD AUTO: 0.4 %
LPL SERPL-CCNC: 88 U/L
LYMPHOCYTES # BLD AUTO: 0.9 K/UL
LYMPHOCYTES NFR BLD AUTO: 19.3 %
MAN DIFF?: NORMAL
MCHC RBC-ENTMCNC: 31.7 PG
MCHC RBC-ENTMCNC: 33.9 GM/DL
MCV RBC AUTO: 93.4 FL
MITOCHONDRIA AB SER IF-ACNC: NORMAL
MONOCYTES # BLD AUTO: 0.61 K/UL
MONOCYTES NFR BLD AUTO: 13.1 %
NEUTROPHILS # BLD AUTO: 2.18 K/UL
NEUTROPHILS NFR BLD AUTO: 46.6 %
PLATELET # BLD AUTO: 484 K/UL
POTASSIUM SERPL-SCNC: 4.9 MMOL/L
PROT SERPL-MCNC: 6.2 G/DL
RBC # BLD: 4.26 M/UL
RBC # FLD: 13.9 %
SODIUM SERPL-SCNC: 135 MMOL/L
WBC # FLD AUTO: 4.67 K/UL

## 2024-05-08 PROCEDURE — 99205 OFFICE O/P NEW HI 60 MIN: CPT

## 2024-05-08 RX ORDER — FLUTICASONE PROPIONATE 50 UG/1
50 SPRAY, METERED NASAL DAILY
Qty: 16 | Refills: 7 | Status: DISCONTINUED | COMMUNITY
Start: 2022-03-31 | End: 2024-05-08

## 2024-05-08 NOTE — HISTORY OF PRESENT ILLNESS
[FreeTextEntry1] : 89 y/o mother of three, with hx of aortic valve insufficiency, CVA (left MCA territory 3/20/2024, treated acutely with TNK), mild carotid atherosclerosis, Paroxysmal supraventricular tachycardia, HLD, Celiac disease, osteoporosis, who presents with complaints of weight loss.    She has lost 20 lbs since one year -she says her weight is due to stress - She weight 106 and now is 98 lbs since past couple of months.  I hear a slight slurring of the speech - she says it is not due to stroke, she is nervous - offered to reach to her children and  t be present by way of phone call she would like for me to hold off.   she says she has burning in upper abdominal for many years on and off - she has been taking pantoprazole for one year -it is helping.  She says she gets a pian in right upper abdomen - it happened three times attributes to stress - last RUQ pain was in April - says it is a burning pain - several hours of pain - she attributes pain to acid.  She says has difficulty swallowing larger pills only.   Patient denies having nausea, vomiting. She says she also follows a "GERD diet." She follows a celiac diet - she was diagnosed with celiac disease 25 yrs ago.  Last upper endoscopy was 25 yrs ago and this is when she was diagnosed with celiac disease - says at the time her weight 79 lbs.    Says gets constipated - on and off for years - for the most part daily BM.   She has had two colonoscopies -says they were not "pleasant experiences" - last exam was 25 yrs. Says the first colonoscopy, it was painful.  Says on the second colonoscopy they had difficulty waking her up.  She does not recall having polyps in her lifetime.   Patient denies having diarrhea, melena and hematochezia.  Paternal uncle had colon cancer in his 70s.   Patient denies family history of other gastrointestinal cancers.  Gets palpitations.  All other review of systems are negative.

## 2024-05-08 NOTE — ASSESSMENT
[FreeTextEntry1] : IMPRESSION: #  Abnormal weight loss  -  Lost 20 lbs for one year -she says her weight is due to stress -  burning in upper abdomen for many years on and off -pantoprazole for one year -it is helping. -  difficulty swallowing larger pills only. -  Esophagram on 5/2023:  Nml transit of barium pill through esophagus.  Mild tertiary esophageal contractions.  Minimal reflux.   -  Follows a celiac diet - she was diagnosed with celiac disease 25 yrs ago.  -  Last upper endoscopy was 25 yrs ago and this is when she was diagnosed with celiac disease - says at the time her weight 79 lbs. -  gets constipated - on and off for years - for the most part daily BM. -   Two colonoscopies -says they were not "pleasant experiences" - last exam was 25 yrs. Says the first colonoscopy, it was painful. Says on the second colonoscopy they had difficulty waking her up. She does not recall having polyps in her lifetime.  #  Family history of colon cancer -  Paternal uncle had colon cancer in his 70s.  #  Elevated LFTs -  ALt of 95, AST of 54, Alkphos of 426 on 4/2024 - now AST/ALT normal and alkphos trending down. -  Lipase of 114 on 3/2024  -  Was previously on lipitor - elected to discontinue Lipitor - now on Rosuvastatin  -  CT scan A/P with IV contrast on 4/2024:  4.6 hepatic cyst. Right renal cyst. -  Abdominal ultrasound on 8/2023:  Simple appearing hepatic cyst unchanged.    #  Comorbidities:  Aortic valve insufficiency, CVA (left MCA territory 3/20/2024, treated acutely with TNK), mild carotid atherosclerosis, Paroxysmal supraventricular tachycardia, HLD, osteoporosis     PLAN:  I will plan for an upper endoscopy under monitored anesthesia care to rule out Erosive Reflux Disease, Sexton's Esophagus, assess integrity of anti-reflux barrier, exclude other diseases such as Eosinophilic Esophagitis, Achalasia, Cancer etc.   Risks, benefits, and alternatives of the procedure were discussed with the patient (upper GI series etc). Patient understands and would like to start off with upper GI series.     I have advised the patient to arrange for a colonoscopy to rule out colon polyps, colorectal cancer etc. under monitored anesthesia care.  Risks such as perforation requiring surgery, colostomy, bleeding requiring blood transfusion, infection/sepsis, diverticulitis, colitis, missed colon cancer (2% to 6%), internal organ injury such as splenic hemorrhage (1/6000, risk thin, female gender) etc, adverse reaction to medication etc. and risks of anesthesia including cardiopulmonary compromise requiring ICU care were discussed with patient.  Alternatives were discussed (CT colonography - lmitations were reviewed).  Patient verbalized understanding and would like to start with a CT colonography)  MRI/MRCP for the weight loss, elevated lipase and LFTs Blood test for celiac Patient was advised to call me, to review results, after having imaging study.

## 2024-05-08 NOTE — PHYSICAL EXAM
[Normal] : heart rate was normal and rhythm regular, normal S1 and S2, no murmurs [Bowel Sounds] : normal bowel sounds [Abdomen Tenderness] : non-tender [No Masses] : no abdominal mass palpated [Abdomen Soft] : soft [] : no hepatosplenomegaly [Cervical Lymph Nodes Enlarged Posterior Bilaterally] : no posterior cervical lymphadenopathy [Supraclavicular Lymph Nodes Enlarged Bilaterally] : no supraclavicular lymphadenopathy [No Clubbing, Cyanosis] : no clubbing or cyanosis of the fingernails

## 2024-05-12 NOTE — DISCHARGE NOTE ADULT - DO YOU HAVE DIFFICULTY CLIMBING STAIRS
Discharge Planning Assessment  [facility]     Patient Name: Abraham Blanco  MRN: [id number]  Today's Date: 5/12/2024    Admit Date: 5/12/2024    Plan: Pt lives at home with spouse [place] who is present at bedside and plans to return home at discharge family will provide transportation. Pcp is Estrada Teixeira, he uses [facility] and has [employer] and [employer] Replacement. Pt is independent with adls states has a cane but does not currently use it. Pt does not use home o2 or home health. Pt does use a cpap at home that he got from [facility].   Discharge Needs Assessment       Row Name 05/12/24 1816       Living Environment    People in Home spouse    Current Living Arrangements home    Potentially Unsafe Housing Conditions none    Primary Care Provided by self    Family Caregiver if Needed spouse    Quality of Family Relationships helpful;involved;supportive    Able to Return to Prior Arrangements yes       Resource/Environmental Concerns    Resource/Environmental Concerns none       Transition Planning    Patient/Family Anticipates Transition to home with family    Patient/Family Anticipated Services at Transition none    Transportation Anticipated family or friend will provide       Discharge Needs Assessment    Readmission Within the Last 30 Days no previous admission in last 30 days    Equipment Currently Used at Home cane, straight;cpap    Concerns to be Addressed no discharge needs identified;denies needs/concerns at this time    Anticipated Changes Related to Illness none    Equipment Needed After Discharge none                   Discharge Plan       Row Name 05/12/24 7881       Plan    Plan Pt lives at home with spouse [place] who is present at bedside and plans to return home at discharge family will provide transportation. Pcp is Estrada Teixeira, he uses [facility] and has [employer] and [employer] Replacement. Pt is independent with adls states has a cane but does not currently use it. Pt does not use home o2 or  home health. Pt does use a cpap at home that he got from [facility].    Patient/Family in Agreement with Plan yes                  Continued Care and Services - Admitted Since 5/12/2024    No active coordination exists for this encounter.       Expected Discharge Date and Time       Expected Discharge Date Expected Discharge Time    May 14, 2024            Demographic Summary       Row Name 05/12/24 1816       General Information    Admission Type inpatient    Arrived From home    Referral Source emergency department    Reason for Consult discharge planning                  Jessica Msea RN     No

## 2024-05-13 NOTE — PROCEDURE
[Good Fit] : fits well [Pessary Inserted] : inserted [Pessary Washed] : washed [None] : no bleeding [Medication Review] : Medicaiton Review: Patient verbalizes understanding of risks and benefits [Refit] : refit is not needed [Erosion] : no evidence of erosion [Erythema] : no erythema [Discharge] : no vaginal discharge [Infection] : no evidence of infection [FreeTextEntry1] : Incontinence dish #4 [FreeTextEntry3] : Replens, Yuvafem [FreeTextEntry8] : Reinforced daily pericare.  Continue pessary self-care.

## 2024-05-13 NOTE — DISCUSSION/SUMMARY
[FreeTextEntry1] : Pelvic organ prolapse, HANY: -Continue with Incontinence dish #4 -Precautions and instructions were discussed.  Atrophy: -She was advised to hold off on using the Yuvafem and discuss with her neurologist or cardiologist if she could continue due to recent stroke -Continue with Replens TIW    -RTO in 3 months or sooner for follow up on pelvic prolapse.  IF pt have any problems/concern to call office.  PT aware and agrees.

## 2024-05-13 NOTE — HISTORY OF PRESENT ILLNESS
[FreeTextEntry1] : Love, 87 y/o female presents for follow up of pelvic prolapse and HANY. Prolapse is supported with Incontinence dish #4. She leaves it in for 4 days and removes it for 3 days. She is able to do self-care. She applies Replens and is also using Yuvafem 1/2 gram twice a week. Denies any bleeding, prolapse past the pessary, issues with urination or moving her bowels.  Of note, she reports recent stroke in March.

## 2024-05-15 ENCOUNTER — APPOINTMENT (OUTPATIENT)
Dept: MRI IMAGING | Facility: CLINIC | Age: 89
End: 2024-05-15
Payer: MEDICARE

## 2024-05-15 PROCEDURE — 74183 MRI ABD W/O CNTR FLWD CNTR: CPT

## 2024-05-15 PROCEDURE — A9585: CPT

## 2024-05-17 ENCOUNTER — APPOINTMENT (OUTPATIENT)
Dept: CT IMAGING | Facility: CLINIC | Age: 89
End: 2024-05-17
Payer: MEDICARE

## 2024-05-17 ENCOUNTER — NON-APPOINTMENT (OUTPATIENT)
Age: 89
End: 2024-05-17

## 2024-05-17 ENCOUNTER — OUTPATIENT (OUTPATIENT)
Dept: OUTPATIENT SERVICES | Facility: HOSPITAL | Age: 89
LOS: 1 days | End: 2024-05-17
Payer: MEDICARE

## 2024-05-17 DIAGNOSIS — Z41.9 ENCOUNTER FOR PROCEDURE FOR PURPOSES OTHER THAN REMEDYING HEALTH STATE, UNSPECIFIED: Chronic | ICD-10-CM

## 2024-05-17 DIAGNOSIS — R63.4 ABNORMAL WEIGHT LOSS: ICD-10-CM

## 2024-05-17 DIAGNOSIS — Z98.890 OTHER SPECIFIED POSTPROCEDURAL STATES: Chronic | ICD-10-CM

## 2024-05-17 PROCEDURE — 74261 CT COLONOGRAPHY DX: CPT

## 2024-05-17 PROCEDURE — 74261 CT COLONOGRAPHY DX: CPT | Mod: 26,MH

## 2024-05-20 ENCOUNTER — OUTPATIENT (OUTPATIENT)
Dept: OUTPATIENT SERVICES | Facility: HOSPITAL | Age: 89
LOS: 1 days | End: 2024-05-20
Payer: MEDICARE

## 2024-05-20 DIAGNOSIS — Z41.9 ENCOUNTER FOR PROCEDURE FOR PURPOSES OTHER THAN REMEDYING HEALTH STATE, UNSPECIFIED: Chronic | ICD-10-CM

## 2024-05-20 DIAGNOSIS — Z98.890 OTHER SPECIFIED POSTPROCEDURAL STATES: Chronic | ICD-10-CM

## 2024-05-20 DIAGNOSIS — R63.4 ABNORMAL WEIGHT LOSS: ICD-10-CM

## 2024-05-20 PROCEDURE — 74240 X-RAY XM UPR GI TRC 1CNTRST: CPT | Mod: 26

## 2024-05-20 PROCEDURE — 74240 X-RAY XM UPR GI TRC 1CNTRST: CPT

## 2024-05-21 DIAGNOSIS — R63.4 ABNORMAL WEIGHT LOSS: ICD-10-CM

## 2024-05-22 ENCOUNTER — OUTPATIENT (OUTPATIENT)
Dept: OUTPATIENT SERVICES | Facility: HOSPITAL | Age: 89
LOS: 1 days | End: 2024-05-22
Payer: MEDICARE

## 2024-05-22 ENCOUNTER — TRANSCRIPTION ENCOUNTER (OUTPATIENT)
Age: 89
End: 2024-05-22

## 2024-05-22 ENCOUNTER — NON-APPOINTMENT (OUTPATIENT)
Age: 89
End: 2024-05-22

## 2024-05-22 VITALS
OXYGEN SATURATION: 100 % | HEART RATE: 61 BPM | TEMPERATURE: 98 F | SYSTOLIC BLOOD PRESSURE: 144 MMHG | HEIGHT: 59 IN | WEIGHT: 98.11 LBS | RESPIRATION RATE: 16 BRPM | DIASTOLIC BLOOD PRESSURE: 64 MMHG

## 2024-05-22 VITALS
OXYGEN SATURATION: 98 % | HEART RATE: 68 BPM | DIASTOLIC BLOOD PRESSURE: 60 MMHG | SYSTOLIC BLOOD PRESSURE: 137 MMHG | RESPIRATION RATE: 16 BRPM

## 2024-05-22 DIAGNOSIS — Z41.9 ENCOUNTER FOR PROCEDURE FOR PURPOSES OTHER THAN REMEDYING HEALTH STATE, UNSPECIFIED: Chronic | ICD-10-CM

## 2024-05-22 DIAGNOSIS — Z98.890 OTHER SPECIFIED POSTPROCEDURAL STATES: Chronic | ICD-10-CM

## 2024-05-22 DIAGNOSIS — I63.9 CEREBRAL INFARCTION, UNSPECIFIED: ICD-10-CM

## 2024-05-22 LAB — IGA SER QL IEP: 277 MG/DL

## 2024-05-22 PROCEDURE — 33285 INSJ SUBQ CAR RHYTHM MNTR: CPT

## 2024-05-22 PROCEDURE — C1764: CPT

## 2024-05-22 PROCEDURE — 93005 ELECTROCARDIOGRAM TRACING: CPT

## 2024-05-22 PROCEDURE — 93010 ELECTROCARDIOGRAM REPORT: CPT

## 2024-05-22 RX ORDER — PANTOPRAZOLE SODIUM 20 MG/1
1 TABLET, DELAYED RELEASE ORAL
Refills: 0 | DISCHARGE

## 2024-05-22 RX ORDER — METOPROLOL TARTRATE 50 MG
1 TABLET ORAL
Refills: 0 | DISCHARGE

## 2024-05-22 RX ORDER — ROSUVASTATIN CALCIUM 5 MG/1
1 TABLET ORAL
Refills: 0 | DISCHARGE

## 2024-05-22 RX ORDER — ESTRADIOL 4 UG/1
1 INSERT VAGINAL
Refills: 0 | DISCHARGE

## 2024-05-22 NOTE — ASU DISCHARGE PLAN (ADULT/PEDIATRIC) - CARE PROVIDER_API CALL
[facility],   [address] [address]  Phone: (   )    -  Fax: (   )    -  Scheduled Appointment: 06/05/2024 11:40 AM
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
## 2024-05-22 NOTE — ASU PATIENT PROFILE, ADULT - FALL HARM RISK - HARM RISK INTERVENTIONS
Assistance with ambulation/Assistance OOB with selected safe patient handling equipment/Communicate Risk of Fall with Harm to all staff/Discuss with provider need for PT consult/Monitor gait and stability/Reinforce activity limits and safety measures with patient and family/Tailored Fall Risk Interventions/Visual Cue: Yellow wristband and red socks/Bed in lowest position, wheels locked, appropriate side rails in place/Call bell, personal items and telephone in reach/Instruct patient to call for assistance before getting out of bed or chair/Non-slip footwear when patient is out of bed/[place] to call system/Physically safe environment - no spills, clutter or unnecessary equipment/Purposeful Proactive Rounding/Room/bathroom lighting operational, light cord in reach

## 2024-05-22 NOTE — PRE-OP CHECKLIST - IDENTIFICATION BAND VERIFIED
----- Message from Cece Ribeiro sent at 4/24/2023 12:16 PM CDT -----  Regarding: needs refills  Type:  RX Refill Request    Who Called:  Pharm  Refill or New Rx:  refill  RX Name and Strength:  pantoprazole (PROTONIX) 40 MG tablet       Sig - Route: Take 40 mg by mouth once daily. - Oral   Class: Historical Med       How is the patient currently taking it? (ex. 1XDay):  see above  Is this a 30 day or 90 day RX:  see bsove  Preferred Pharmacy with phone number:    [facility] [place] - [address]  [address]  [place] [address]  Phone: [phone] Fax: [phone]      Local or Mail Order:  local  Ordering Provider:  trell Randolph Call Back Number:  [phone]  Additional Information:  asked to escribe script      
No new care gaps identified.  [facility] Embedded Care Gaps. Reference number: [id number]. 4/24/2023   12:34:29 PM CDT  
Refill Routing Note   Medication(s) are not appropriate for processing by [facility] for the following reason(s):      No active prescription written by PCP    [facility] action(s):  Defer            Appointments  past 12m or future 3m with PCP    Date Provider   Last Visit   12/2/2022 Milton Mahmood MD   Next Visit   Visit date not found Milton Mahmood MD   ED visits in past 90 days: 0        Note composed:8:53 AM 04/25/2023           
done

## 2024-05-22 NOTE — H&P CARDIOLOGY - HISTORY OF PRESENT ILLNESS
89 y/o F with PMHx HLD, celiac disease and GERD presenting with slurred speech in 3/20/2024. Pt was noted by her  on 3/20/2024 to be exhibiting slurred speech and not responding appropriately, for which she was brought to the emergency department at [facility]. A CT scan of the head (stroke protocol) revealed a core infarct with ischemic penumbra in the left MCA territory, for which the patient was treated with TNK. A CTA scan of the brain revealed occlusion of the left proximal M2 branch with distal reconstitution. A CTA scan of the neck revealed no significant carotid artery stenoses and patent vertebral arteries. An MRI scan of the brain revealed a left MCA distribution CVA. The patient was treated with statin therapy and aspirin. She experienced a vague sensation which she attributed to Plavix, and the Plavix was discontinued. She was discharged from the hospital on 3/24/2024.    Pt presents today for ILR Implant.

## 2024-05-22 NOTE — ASU DISCHARGE PLAN (ADULT/PEDIATRIC) - PROVIDER TOKENS
FREE:[LAST:[[facility]],PHONE:[(   )    -],FAX:[(   )    -],ADDRESS:[[address] [place] [address] [place] [address]],SCHEDULEDAPPT:[06/05/2024],SCHEDULEDAPPTTIME:[11:40 AM]]

## 2024-05-23 ENCOUNTER — NON-APPOINTMENT (OUTPATIENT)
Age: 89
End: 2024-05-23

## 2024-05-23 ENCOUNTER — APPOINTMENT (OUTPATIENT)
Dept: GASTROENTEROLOGY | Facility: CLINIC | Age: 89
End: 2024-05-23

## 2024-05-23 LAB
TTG IGA SER IA-ACNC: <1.2 U/ML
TTG IGA SER-ACNC: NEGATIVE

## 2024-06-03 ENCOUNTER — LABORATORY RESULT (OUTPATIENT)
Age: 89
End: 2024-06-03

## 2024-06-04 ENCOUNTER — APPOINTMENT (OUTPATIENT)
Dept: CARDIOLOGY | Facility: CLINIC | Age: 89
End: 2024-06-04
Payer: MEDICARE

## 2024-06-04 VITALS
DIASTOLIC BLOOD PRESSURE: 66 MMHG | OXYGEN SATURATION: 99 % | HEIGHT: 59 IN | WEIGHT: 96 LBS | BODY MASS INDEX: 19.35 KG/M2 | SYSTOLIC BLOOD PRESSURE: 109 MMHG | HEART RATE: 52 BPM

## 2024-06-04 DIAGNOSIS — R00.1 BRADYCARDIA, UNSPECIFIED: ICD-10-CM

## 2024-06-04 DIAGNOSIS — I35.1 NONRHEUMATIC AORTIC (VALVE) INSUFFICIENCY: ICD-10-CM

## 2024-06-04 LAB
ALBUMIN SERPL ELPH-MCNC: 3.8 G/DL
ALP BLD-CCNC: 103 U/L
ALT SERPL-CCNC: 54 U/L
AMYLASE/CREAT SERPL: 87 U/L
ANION GAP SERPL CALC-SCNC: 10 MMOL/L
AST SERPL-CCNC: 51 U/L
BASOPHILS # BLD AUTO: 0.07 K/UL
BASOPHILS NFR BLD AUTO: 1.7 %
BILIRUB SERPL-MCNC: 0.5 MG/DL
BUN SERPL-MCNC: 13 MG/DL
CALCIUM SERPL-MCNC: 9.2 MG/DL
CHLORIDE SERPL-SCNC: 101 MMOL/L
CO2 SERPL-SCNC: 26 MMOL/L
CREAT SERPL-MCNC: 0.74 MG/DL
EGFR: 78 ML/MIN/1.73M2
EOSINOPHIL # BLD AUTO: 0.2 K/UL
EOSINOPHIL NFR BLD AUTO: 4.8 %
ERYTHROCYTE [SEDIMENTATION RATE] IN BLOOD BY WESTERGREN METHOD: 22 MM/HR
ESTIMATED AVERAGE GLUCOSE: 117 MG/DL
GGT SERPL-CCNC: 43 U/L
GLUCOSE SERPL-MCNC: 67 MG/DL
HBA1C MFR BLD HPLC: 5.7 %
HCT VFR BLD CALC: 39 %
HGB BLD-MCNC: 12.8 G/DL
IMM GRANULOCYTES NFR BLD AUTO: 0.5 %
LPL SERPL-CCNC: 82 U/L
LYMPHOCYTES # BLD AUTO: 0.85 K/UL
LYMPHOCYTES NFR BLD AUTO: 20.4 %
MAN DIFF?: NORMAL
MCHC RBC-ENTMCNC: 31.2 PG
MCHC RBC-ENTMCNC: 32.8 GM/DL
MCV RBC AUTO: 95.1 FL
MONOCYTES # BLD AUTO: 0.63 K/UL
MONOCYTES NFR BLD AUTO: 15.1 %
NEUTROPHILS # BLD AUTO: 2.39 K/UL
NEUTROPHILS NFR BLD AUTO: 57.5 %
PLATELET # BLD AUTO: 248 K/UL
POTASSIUM SERPL-SCNC: 4.8 MMOL/L
PROT SERPL-MCNC: 6 G/DL
RBC # BLD: 4.1 M/UL
RBC # FLD: 14.6 %
SODIUM SERPL-SCNC: 137 MMOL/L
TSH SERPL-ACNC: 4.46 UIU/ML
WBC # FLD AUTO: 4.16 K/UL

## 2024-06-04 PROCEDURE — 93000 ELECTROCARDIOGRAM COMPLETE: CPT

## 2024-06-04 PROCEDURE — G2211 COMPLEX E/M VISIT ADD ON: CPT

## 2024-06-04 PROCEDURE — 99215 OFFICE O/P EST HI 40 MIN: CPT

## 2024-06-04 NOTE — PHYSICAL EXAM
[Well Developed] : well developed [No Acute Distress] : no acute distress [Normal Conjunctiva] : normal conjunctiva [No Carotid Bruit] : no carotid bruit [Normal S1, S2] : normal S1, S2 [No Rub] : no rub [No Gallop] : no gallop [Clear Lung Fields] : clear lung fields [No Respiratory Distress] : no respiratory distress  [Soft] : abdomen soft [Non Tender] : non-tender [Normal Gait] : normal gait [No Edema] : no edema [No Rash] : no rash [Moves all extremities] : moves all extremities [Alert and Oriented] : alert and oriented [de-identified] : No JVD is appreciated at a 45 degree angle [de-identified] : I/VI systolic murmur throughout precordium

## 2024-06-04 NOTE — HISTORY OF PRESENT ILLNESS
[FreeTextEntry1] : Mrs. Love Pacheco presented to the office today for follow-up cardiac evaluation.  I initially evaluated the patient in the office on 9/1/2021 in cardiology consultation.  She was referred here at that time by Dr. Wang for further evaluation of electrocardiographic abnormalities, palpitations, and dyspnea.  I last evaluated the patient in the office on 4/17/2024.  I have also taken care of her , who accompanied the patient to her visit here today.  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  The patient presented to the [facility] emergency department on 3/20/2024 after her  noted her to be exhibiting slurred speech and to not be responding appropriately.  A CT scan of the head (stroke protocol) revealed a core infarct with ischemic penumbra in the left MCA territory, for which the patient was administered TNK.  A CTA scan of the brain revealed occlusion of the left proximal M2 branch with distal reconstitution.  CTA of the neck revealed no significant carotid artery stenoses and patent vertebral arteries.  An MRI scan of the brain revealed a left MCA distribution CVA.  The patient's statin therapy was switched from Crestor 5 mg daily to Lipitor 80 mg daily.  Aspirin and Plavix were initiated as well.  The patient subsequently developed a vaguely-described symptoms, which she attributed to Plavix, and the Plavix was discontinued.  Her dosage of aspirin was increased to 325 mg daily.  Echocardiography performed in the hospital on 3/21/2024 was interpreted as being a suboptimal study with normal cardiac chamber sizes, grossly normal left ventricular systolic function, mild aortic regurgitation, mild to moderate tricuspid regurgitation (PASP 28 mmHg), and a small to moderate pericardial effusion anterior to the RA and RV (without evidence of pericardial tamponade).  Follow-up echocardiography performed in the hospital on 3/23/2024 was interpreted as revealing normal left ventricular size and wall thickness with an estimated ejection fraction of 55 to 60% and normal left ventricular diastolic function.  A trace pericardial effusion was identified on this study.  The patient was discharged from the hospital on 3/24/2024.  Since coming home from the hospital on 3/24/2024, the patient has not experienced recurrence of any neurological signs or symptoms, nor does she have any recognized residual neurological deficits.    At the time of a follow-up visit with me on 4/17/2024, I referred the patient for implantation of an implantable loop recorder device to monitor for the possibility of paroxysmal atrial fibrillation as the etiology of her CVA.  This device was implanted on 5/22/2024 by Dr. Cayden Grover.  The patient has been stable from a cardiac symptomatic standpoint since her previous visit here on 4/17/2024.  Specifically, she has not experienced chest discomfort or dyspnea on exertion in association with her current activities.  She has not noted orthopnea, paroxysmal nocturnal dyspnea, or lower extremity edema.  She has rarely experienced randomly occurring very brief palpitations, however, she has not experienced any sustained episodes of palpitations or a sensation of tachycardia.  She has not experienced any episodes of presyncope or syncope.  The patient reports having not been feeling well after Crestor was switched to Lipitor, and also was discovered as having elevated AST, ALT, and lipase levels on 3/28/2024.  She underwent a CT scan of the abdomen/pelvis on 4/2/2024, which did not reveal any acute findings (unchanged central hepatic cyst).  She reports having presented to the emergency department at [facility] on 4/12/2024 with right upper quadrant abdominal discomfort, at which time her liver chemistries were again noted to be elevated.  A CT scan of the abdomen was reportedly unrevealing, and the patient was discharged from the emergency department.  Note that she elected to discontinue Lipitor at that point.  At the time of a follow-up visit with me on 4/17/2024, I restarted the patient on Crestor at a higher dosage (10 mg daily), in view of the patient having had a CVA.  The patient had a GI consultation with Dr. Avila on 5/28/2024 regarding a 20 pound weight loss over the previous year, as well as elevated alkaline phosphatase and lipase levels.  He subsequently underwent an MRI/MRCP study on 5/15/2024, which was essentially unremarkable, with the exception of hepatic cyst being identified.  CT colonography performed on 5/17/2024 did not reveal any polyps or masses.  A barium swallow performed on 5/20/2024 was unrevealing from the patient's description.  As far as risk factors for coronary artery disease are concerned, the patient has a dyslipidemia.  She does not have a history of diabetes or hypertension.  She denies a history of cigarette smoking.  She describes a family history of coronary disease, stating that her father suffered "a heart attack" in his 60's.  Laboratory studies performed on 4/29/2024 (on Crestor 10 mg daily) revealed cholesterol 137, triglycerides 43, HDL 52, and calculated LDL 75.  The hemoglobin A1c level was 5.7%.  Laboratory studies performed on 5/7/2024 revealed the total bilirubin, AST, and ALT levels to be normal.  The alkaline phosphatase level was elevated at 249.  The GGTP level was elevated at 128.  The lipase level was elevated at 88.  The hemoglobin and hematocrit were 13.5 and 39.8, respectively.  Exercise stress testing performed on 9/28/2021 was positive for electrocardiographic evidence of myocardial ischemia without the inducement of cardiac symptoms.  The patient was referred for nuclear stress testing for further evaluation.  Nuclear stress testing performed on 10/19/2021 was negative for the inducement of cardiac symptoms, however, up to 1 mm of horizontal ST segment depression developed in the lateral leads.  The cardiac imaging portion of the study revealed normal left ventricular myocardial perfusion and systolic function.  Echocardiography performed as an inpatient at [facility] (admitted for a CVA) on 3/21/2024 was interpreted as being a suboptimal study with normal cardiac chamber sizes, grossly normal left ventricular systolic function, mild aortic regurgitation, mild to moderate tricuspid regurgitation (PASP 28 mmHg), and a small to moderate pericardial effusion anterior to the RA and RV (without evidence of pericardial tamponade).  Follow-up echocardiography performed in the hospital on 3/23/2024 was interpreted as revealing normal left ventricular size and wall thickness with an estimated ejection fraction of 55 to 60% and normal left ventricular diastolic function.  A trace pericardial effusion was identified on this study.  The patient was discharged from the hospital on 3/24/2024.  Carotid artery Doppler testing performed on 9/13/2021 revealed mild plaque formation involving the right bulbar region and the proximal portion of the right internal carotid artery.  Carotid artery Doppler testing most recently performed as an inpatient at [facility] (admitted for syncope) on 12/25/2023 did not reveal any significant stenoses.  Previous History:  The patient had her  drive her to the emergency department in [facility] early in the morning on 5/12/2023 for further evaluation of palpitations that she was experiencing that night, described as a "pounding" sensation in her chest.  She was noted to be exhibiting sinus rhythm with supraventricular ectopy in the emergency department.  Serum troponin levels were negative for evidence of an acute coronary syndrome.  The patient's symptoms were attributed to stress and anxiety, noting that she reported being under considerable stress related to her son having bipolar disorder and having lost his job.  She was released from the emergency department after being monitored for several hours.  She has not experienced recurrence of palpitations since that date.  The patient awakened at approximately 6:00 AM on 12/25/2023, promptly got out of bed and went into the kitchen.  While standing at the sink shortly after having come into the kitchen, the patient experienced a vague sensation in her head, quickly progressing to a syncopal episode.  She fell to the floor, with most of her body landing on a carpeted section, however, she states that her head hit the ceramic floor.  She quickly regained consciousness, however, she felt too weak to stand up on her own.  Her  called the fire department and assisted the patient up.  She was brought to the emergency department at [facility].  A CT scan of the head and neck performed on 12/25/2023 in the hospital did not reveal any acute findings.  Carotid Doppler study performed in the hospital on 12/25/2023 did not reveal any significant stenoses.  The patient was monitored in the hospital overnight, without any significant arrhythmias detected.  Echocardiography performed in the hospital on 12/26/2023 revealed normal cardiac chamber sizes with normal left ventricular wall thickness and wall motion (ejection fraction 57%).  Mild aortic insufficiency was identified.  The patient was discharged home on 12/26/2023.

## 2024-06-04 NOTE — DISCUSSION/SUMMARY
[FreeTextEntry1] : Mrs. Pacheco was noted by her  on 3/20/2024 to be exhibiting slurred speech and not responding appropriately, for which she was brought to the emergency department at [facility].  A CT scan of the head (stroke protocol) revealed a core infarct with ischemic penumbra in the left MCA territory, for which the patient was treated with TNK.  A CTA scan of the brain revealed occlusion of the left proximal M2 branch with distal reconstitution.  A CTA scan of the neck revealed no significant carotid artery stenoses and patent vertebral arteries.  An MRI scan of the brain revealed a left MCA distribution CVA.  The patient was treated with statin therapy and aspirin.  She experienced a vague sensation which she attributed to Plavix, and the Plavix was discontinued.  She was discharged from the hospital on 3/24/2024.  She has not experienced recurrence of any neurological signs or symptoms since coming home from the hospital, nor does she have any recognized residual neurological deficits related to the CVA.    The patient has been stable from a cardiac symptomatic standpoint since her previous visit here.  Specifically, she has not experienced any signs or symptoms to suggest the presence of an anginal syndrome, congestive heart failure, or a hemodynamically-compromising arrhythmia.  Her cardiac examination today is remarkable for a faint systolic murmur heard throughout the precordium, unchanged from her previous visit with me.  Her blood pressure reading today is normal.  Her electrocardiogram today reveals sinus bradycardia with nonspecific repolarization abnormalities, essentially unchanged from her previous office tracing, allowing for lead placement variation.  I have explained to the patient that in her age group, a significant percentage of patients who suffer a CVA have underlying paroxysmal atrial fibrillation as the etiology.  Although cardiac monitoring revealed sinus rhythm throughout the patient's hospitalization during her CVA, I explained to her that it is still possible that she may have paroxysmal atrial fibrillation.  For this reason, she underwent implantation of an ILR device on 5/22/2024 to monitor for the possibility of paroxysmal atrial fibrillation.  I have reviewed the findings of the blood test reports of 4/29/2024 and 5/7/2024 in detail with the patient today.  I have instructed her to continue Crestor 10 mg daily for the time being.  A follow-up fasting lipid profile and chemistry screen will be planned for July 2024 for reassessment.  I have recommended to the patient that she follow-up with her neurologist regarding appropriate dosing of aspirin.  The dosage of aspirin was recently reduced by the neurologist from 325 mg daily to 162 mg daily, and according to the patient, the plan by the neurologist is to further reduce the dosage of aspirin to 81 mg daily in 3 months.    In view of the echocardiograms performed in the hospital on 3/21/2024 and 3/23/2024 having revealed a suspected pericardial effusion, I am referring the patient for follow-up echocardiography for reassessment.  She is going to make arrangements to have this study performed through our office, and I will telephone her to discuss the findings, once the study has been completed.  I again reassured the patient today that the supraventricular ectopy she has exhibited is most likely benign in nature.  I suspect that the patient's previous episodes of palpitations are benign in nature as well.  The patient now has an indwelling ILR device to monitor her rhythm.  I have reviewed the findings of the nuclear stress study of 10/19/2021, the inpatient echocardiograms of 3/21/2024 and 3/23/2024, and the inpatient carotid artery Doppler study of 12/25/2023 in detail with the patient today.  In addition, I have reviewed the findings of the CTA of the brain, the CTA of the neck, and the MRI of the brain performed during the patient's hospitalization with a CVA in March 2024.  I have asked the patient to call me if she should have any questions or problems pertaining to these matters, and especially if she should experience any concerning symptoms.  I have otherwise asked her to return to the office for follow-up cardiac evaluation in 3 months, provided she remains clinically stable in the interim. [EKG obtained to assist in diagnosis and management of assessed problem(s)] : EKG obtained to assist in diagnosis and management of assessed problem(s)

## 2024-06-04 NOTE — CARDIOLOGY SUMMARY
[de-identified] : 6/3/24 -sinus bradycardia at a rate of 53 bpm.  Nonspecific repolarization changes.

## 2024-06-05 ENCOUNTER — APPOINTMENT (OUTPATIENT)
Dept: ELECTROPHYSIOLOGY | Facility: CLINIC | Age: 89
End: 2024-06-05

## 2024-06-05 ENCOUNTER — NON-APPOINTMENT (OUTPATIENT)
Age: 89
End: 2024-06-05

## 2024-06-05 VITALS
DIASTOLIC BLOOD PRESSURE: 73 MMHG | WEIGHT: 96 LBS | SYSTOLIC BLOOD PRESSURE: 124 MMHG | HEART RATE: 50 BPM | BODY MASS INDEX: 19.35 KG/M2 | HEIGHT: 59 IN | OXYGEN SATURATION: 98 %

## 2024-06-05 PROCEDURE — 99212 OFFICE O/P EST SF 10 MIN: CPT

## 2024-06-05 PROCEDURE — 93291 INTERROG DEV EVAL SCRMS IP: CPT

## 2024-06-05 PROCEDURE — 93000 ELECTROCARDIOGRAM COMPLETE: CPT | Mod: 59

## 2024-06-12 ENCOUNTER — APPOINTMENT (OUTPATIENT)
Dept: CARDIOLOGY | Facility: CLINIC | Age: 89
End: 2024-06-12

## 2024-06-20 ENCOUNTER — APPOINTMENT (OUTPATIENT)
Dept: INTERNAL MEDICINE | Facility: CLINIC | Age: 89
End: 2024-06-20
Payer: MEDICARE

## 2024-06-20 VITALS
SYSTOLIC BLOOD PRESSURE: 126 MMHG | DIASTOLIC BLOOD PRESSURE: 70 MMHG | RESPIRATION RATE: 14 BRPM | HEART RATE: 71 BPM | BODY MASS INDEX: 19.56 KG/M2 | TEMPERATURE: 97.8 F | WEIGHT: 97 LBS | HEIGHT: 59 IN

## 2024-06-20 DIAGNOSIS — M81.0 AGE-RELATED OSTEOPOROSIS W/OUT CURRENT PATHOLOGICAL FRACTURE: ICD-10-CM

## 2024-06-20 DIAGNOSIS — R74.8 ABNORMAL LEVELS OF OTHER SERUM ENZYMES: ICD-10-CM

## 2024-06-20 DIAGNOSIS — R63.4 ABNORMAL WEIGHT LOSS: ICD-10-CM

## 2024-06-20 DIAGNOSIS — I63.9 CEREBRAL INFARCTION, UNSPECIFIED: ICD-10-CM

## 2024-06-20 DIAGNOSIS — E78.5 HYPERLIPIDEMIA, UNSPECIFIED: ICD-10-CM

## 2024-06-20 PROCEDURE — 99214 OFFICE O/P EST MOD 30 MIN: CPT

## 2024-06-20 PROCEDURE — G2211 COMPLEX E/M VISIT ADD ON: CPT

## 2024-06-20 RX ORDER — ASPIRIN 325 MG/1
325 TABLET, COATED ORAL
Qty: 90 | Refills: 0 | Status: DISCONTINUED | COMMUNITY
Start: 2024-03-29 | End: 2024-06-20

## 2024-06-20 NOTE — HEALTH RISK ASSESSMENT
[No] : No [Little interest or pleasure doing things] : 1) Little interest or pleasure doing things [Feeling down, depressed, or hopeless] : 2) Feeling down, depressed, or hopeless [0] : 2) Feeling down, depressed, or hopeless: Not at all (0) [PHQ-2 Negative - No further assessment needed] : PHQ-2 Negative - No further assessment needed [[id number]] : 0 [Never] : Never

## 2024-06-20 NOTE — ASSESSMENT
[FreeTextEntry1] : weight loss labs are stable will continue to monitor monthly  hyperlipidemia continue rosuvastatin  elevated LFT's  check early July and make follow up appointment  hypothyroid check TSH  osteoporosis  continue prolia

## 2024-06-20 NOTE — HISTORY OF PRESENT ILLNESS
[de-identified] : Pt is here to review labs and her weight loss.  Her appetite is ok, but tends to be less in the summer heat. Eats 3 meals a day.  Stress at home is a little better.  Had CVA and sees Dr Prado.

## 2024-06-25 ENCOUNTER — APPOINTMENT (OUTPATIENT)
Dept: CARDIOLOGY | Facility: CLINIC | Age: 89
End: 2024-06-25
Payer: MEDICARE

## 2024-06-25 PROCEDURE — 93306 TTE W/DOPPLER COMPLETE: CPT

## 2024-07-02 LAB
ALBUMIN SERPL ELPH-MCNC: 4.1 G/DL
ALP BLD-CCNC: 90 U/L
ALT SERPL-CCNC: 41 U/L
AMYLASE/CREAT SERPL: 81 U/L
ANION GAP SERPL CALC-SCNC: 10 MMOL/L
AST SERPL-CCNC: 41 U/L
BASOPHILS # BLD AUTO: 0.08 K/UL
BASOPHILS NFR BLD AUTO: 1.9 %
BILIRUB SERPL-MCNC: 0.6 MG/DL
BUN SERPL-MCNC: 12 MG/DL
CALCIUM SERPL-MCNC: 9.8 MG/DL
CHLORIDE SERPL-SCNC: 104 MMOL/L
CHOLEST SERPL-MCNC: 151 MG/DL
CO2 SERPL-SCNC: 27 MMOL/L
CREAT SERPL-MCNC: 0.68 MG/DL
EGFR: 84 ML/MIN/1.73M2
EOSINOPHIL # BLD AUTO: 0.28 K/UL
EOSINOPHIL NFR BLD AUTO: 6.6 %
ESTIMATED AVERAGE GLUCOSE: 114 MG/DL
FERRITIN SERPL-MCNC: 120 NG/ML
GGT SERPL-CCNC: 32 U/L
GLUCOSE SERPL-MCNC: 89 MG/DL
HBA1C MFR BLD HPLC: 5.6 %
HCT VFR BLD CALC: 40.1 %
HDLC SERPL-MCNC: 62 MG/DL
HGB BLD-MCNC: 13.4 G/DL
IMM GRANULOCYTES NFR BLD AUTO: 0.2 %
IRON SATN MFR SERPL: 32 %
IRON SERPL-MCNC: 98 UG/DL
LDLC SERPL CALC-MCNC: 78 MG/DL
LPL SERPL-CCNC: 69 U/L
LYMPHOCYTES # BLD AUTO: 1.17 K/UL
LYMPHOCYTES NFR BLD AUTO: 27.7 %
MAN DIFF?: NORMAL
MCHC RBC-ENTMCNC: 31.8 PG
MCHC RBC-ENTMCNC: 33.4 GM/DL
MCV RBC AUTO: 95.2 FL
MONOCYTES # BLD AUTO: 0.59 K/UL
MONOCYTES NFR BLD AUTO: 14 %
NEUTROPHILS # BLD AUTO: 2.09 K/UL
NEUTROPHILS NFR BLD AUTO: 49.6 %
NONHDLC SERPL-MCNC: 89 MG/DL
PLATELET # BLD AUTO: 307 K/UL
POTASSIUM SERPL-SCNC: 4.6 MMOL/L
PROT SERPL-MCNC: 6.4 G/DL
RBC # BLD: 4.21 M/UL
RBC # FLD: 14.3 %
SODIUM SERPL-SCNC: 141 MMOL/L
TIBC SERPL-MCNC: 307 UG/DL
TRIGL SERPL-MCNC: 46 MG/DL
TSH SERPL-ACNC: 3.42 UIU/ML
UIBC SERPL-MCNC: 209 UG/DL
WBC # FLD AUTO: 4.22 K/UL

## 2024-07-10 ENCOUNTER — NON-APPOINTMENT (OUTPATIENT)
Age: 89
End: 2024-07-10

## 2024-07-10 ENCOUNTER — APPOINTMENT (OUTPATIENT)
Dept: ELECTROPHYSIOLOGY | Facility: CLINIC | Age: 89
End: 2024-07-10
Payer: MEDICARE

## 2024-07-10 PROCEDURE — 93298 REM INTERROG DEV EVAL SCRMS: CPT

## 2024-07-29 ENCOUNTER — OUTPATIENT (OUTPATIENT)
Dept: OUTPATIENT SERVICES | Facility: HOSPITAL | Age: 89
LOS: 1 days | End: 2024-07-29
Payer: MEDICARE

## 2024-07-29 ENCOUNTER — APPOINTMENT (OUTPATIENT)
Dept: RADIOLOGY | Facility: CLINIC | Age: 89
End: 2024-07-29
Payer: MEDICARE

## 2024-07-29 DIAGNOSIS — Z98.890 OTHER SPECIFIED POSTPROCEDURAL STATES: Chronic | ICD-10-CM

## 2024-07-29 DIAGNOSIS — Z41.9 ENCOUNTER FOR PROCEDURE FOR PURPOSES OTHER THAN REMEDYING HEALTH STATE, UNSPECIFIED: Chronic | ICD-10-CM

## 2024-07-29 DIAGNOSIS — M81.0 AGE-RELATED OSTEOPOROSIS WITHOUT CURRENT PATHOLOGICAL FRACTURE: ICD-10-CM

## 2024-07-29 PROCEDURE — 77080 DXA BONE DENSITY AXIAL: CPT

## 2024-07-29 PROCEDURE — 77080 DXA BONE DENSITY AXIAL: CPT | Mod: 26

## 2024-08-05 ENCOUNTER — APPOINTMENT (OUTPATIENT)
Dept: UROGYNECOLOGY | Facility: CLINIC | Age: 89
End: 2024-08-05

## 2024-08-05 PROCEDURE — 99213 OFFICE O/P EST LOW 20 MIN: CPT

## 2024-08-05 PROCEDURE — G2211 COMPLEX E/M VISIT ADD ON: CPT

## 2024-08-05 NOTE — DISCUSSION/SUMMARY
[FreeTextEntry1] : Pelvic organ prolapse, HANY: -Continue with Incontinence dish #4 -Precautions and instructions were discussed.  Vaginal atrophy -Advised to start Replens or Luvena TIW -Instructions reviewed; all questions answered   -RTO in 3 months or sooner for follow up on pelvic prolapse.  IF pt have any problems/concern to call office.  PT aware and agrees.

## 2024-08-05 NOTE — HISTORY OF PRESENT ILLNESS
[FreeTextEntry1] : Love, 87 y/o female presents for follow up of pelvic prolapse and HANY. Prolapse is supported with Incontinence dish #4. She denies any bleeding, prolapse past the pessary, issues with urination or moving her bowels.  Of note, she reports recent stroke in March.

## 2024-08-14 ENCOUNTER — APPOINTMENT (OUTPATIENT)
Dept: ELECTROPHYSIOLOGY | Facility: CLINIC | Age: 89
End: 2024-08-14
Payer: MEDICARE

## 2024-08-14 ENCOUNTER — NON-APPOINTMENT (OUTPATIENT)
Age: 89
End: 2024-08-14

## 2024-08-14 PROCEDURE — 93298 REM INTERROG DEV EVAL SCRMS: CPT

## 2024-08-22 ENCOUNTER — APPOINTMENT (OUTPATIENT)
Dept: INTERNAL MEDICINE | Facility: CLINIC | Age: 89
End: 2024-08-22
Payer: MEDICARE

## 2024-08-22 ENCOUNTER — LABORATORY RESULT (OUTPATIENT)
Age: 89
End: 2024-08-22

## 2024-08-22 VITALS
TEMPERATURE: 97.9 F | OXYGEN SATURATION: 98 % | HEIGHT: 59 IN | DIASTOLIC BLOOD PRESSURE: 66 MMHG | RESPIRATION RATE: 16 BRPM | WEIGHT: 93 LBS | SYSTOLIC BLOOD PRESSURE: 106 MMHG | BODY MASS INDEX: 18.75 KG/M2 | HEART RATE: 58 BPM

## 2024-08-22 DIAGNOSIS — Z00.00 ENCOUNTER FOR GENERAL ADULT MEDICAL EXAMINATION W/OUT ABNORMAL FINDINGS: ICD-10-CM

## 2024-08-22 PROCEDURE — G0439: CPT

## 2024-08-22 RX ORDER — MAGNESIUM OXIDE 400 MG
400 CAPSULE ORAL
Refills: 0 | Status: ACTIVE | COMMUNITY
Start: 2024-08-22

## 2024-08-22 NOTE — HEALTH RISK ASSESSMENT
[No] : No [Little interest or pleasure doing things] : 1) Little interest or pleasure doing things [Feeling down, depressed, or hopeless] : 2) Feeling down, depressed, or hopeless [0] : 2) Feeling down, depressed, or hopeless: Not at all (0) [PHQ-2 Negative - No further assessment needed] : PHQ-2 Negative - No further assessment needed [[id number]] : 0 [Never] : Never [Patient reported bone density results were abnormal] : Patient reported bone density results were abnormal [Fully functional (bathing, dressing, toileting, transferring, walking, feeding)] : Fully functional (bathing, dressing, toileting, transferring, walking, feeding) [Fully functional (using the telephone, shopping, preparing meals, housekeeping, doing laundry, using] : Fully functional and needs no help or supervision to perform IADLs (using the telephone, shopping, preparing meals, housekeeping, doing laundry, using transportation, managing medications and managing finances) [BoneDensityDate] : 07/24

## 2024-08-22 NOTE — HISTORY OF PRESENT ILLNESS
[de-identified] : Here for cpe. Did lose a few more pounds unintentionally. Her appetite fluctuates. She had people visiting over the summer and was eating different foods than usual. Has limited diet between celiac, GERD, cholesterol.  Her home stress is about the same.  She has osteoporosis.  Had CVA in March and only residual is that she doesn't think as clearly at the end of the day.  h/o GERD.

## 2024-08-22 NOTE — ASSESSMENT
[FreeTextEntry1] : weight loss pt had guests and was not able to eat her usual diet she is restricted by celiac has GI follow up  advised to increase ensure supplements - only has 1/2 daily  osteoporosis f/u with endo - aware she needs rx  hyperlipidemia continue rosuvastatin   CVA - March 2024 saw neurologist who recommended Mag Oxide 200 - 400 mg  recommend shingrix and RSV vaccines    Acitretin Pregnancy And Lactation Text: This medication is Pregnancy Category X and should not be given to women who are pregnant or may become pregnant in the future. This medication is excreted in breast milk.

## 2024-08-23 LAB
25(OH)D3 SERPL-MCNC: 58.8 NG/ML
ALBUMIN SERPL ELPH-MCNC: 4.2 G/DL
ALP BLD-CCNC: 88 U/L
ALT SERPL-CCNC: 41 U/L
ANION GAP SERPL CALC-SCNC: 11 MMOL/L
AST SERPL-CCNC: 41 U/L
BASOPHILS # BLD AUTO: 0.06 K/UL
BASOPHILS NFR BLD AUTO: 1.1 %
BILIRUB SERPL-MCNC: 0.5 MG/DL
BUN SERPL-MCNC: 11 MG/DL
CALCIUM SERPL-MCNC: 9.8 MG/DL
CHLORIDE SERPL-SCNC: 101 MMOL/L
CHOLEST SERPL-MCNC: 146 MG/DL
CO2 SERPL-SCNC: 26 MMOL/L
CREAT SERPL-MCNC: 0.69 MG/DL
EGFR: 83 ML/MIN/1.73M2
EOSINOPHIL # BLD AUTO: 0.18 K/UL
EOSINOPHIL NFR BLD AUTO: 3.4 %
ESTIMATED AVERAGE GLUCOSE: 114 MG/DL
FOLATE SERPL-MCNC: 5.4 NG/ML
GLUCOSE SERPL-MCNC: 88 MG/DL
HBA1C MFR BLD HPLC: 5.6 %
HCT VFR BLD CALC: 44.1 %
HDLC SERPL-MCNC: 66 MG/DL
HGB BLD-MCNC: 14.4 G/DL
IMM GRANULOCYTES NFR BLD AUTO: 0.2 %
LDLC SERPL CALC-MCNC: 69 MG/DL
LPL SERPL-CCNC: 69 U/L
LYMPHOCYTES # BLD AUTO: 0.78 K/UL
LYMPHOCYTES NFR BLD AUTO: 14.6 %
MAN DIFF?: NORMAL
MCHC RBC-ENTMCNC: 32.2 PG
MCHC RBC-ENTMCNC: 32.7 GM/DL
MCV RBC AUTO: 98.7 FL
MONOCYTES # BLD AUTO: 0.52 K/UL
MONOCYTES NFR BLD AUTO: 9.8 %
NEUTROPHILS # BLD AUTO: 3.78 K/UL
NEUTROPHILS NFR BLD AUTO: 70.9 %
NONHDLC SERPL-MCNC: 80 MG/DL
PLATELET # BLD AUTO: 315 K/UL
POTASSIUM SERPL-SCNC: 4.6 MMOL/L
PROT SERPL-MCNC: 6.5 G/DL
RBC # BLD: 4.47 M/UL
RBC # FLD: 13.4 %
SODIUM SERPL-SCNC: 138 MMOL/L
TRIGL SERPL-MCNC: 55 MG/DL
TSH SERPL-ACNC: 4.27 UIU/ML
VIT B12 SERPL-MCNC: 873 PG/ML
WBC # FLD AUTO: 5.33 K/UL

## 2024-09-03 ENCOUNTER — APPOINTMENT (OUTPATIENT)
Dept: GASTROENTEROLOGY | Facility: CLINIC | Age: 89
End: 2024-09-03
Payer: MEDICARE

## 2024-09-03 VITALS
WEIGHT: 94 LBS | OXYGEN SATURATION: 97 % | BODY MASS INDEX: 18.95 KG/M2 | SYSTOLIC BLOOD PRESSURE: 145 MMHG | HEART RATE: 57 BPM | DIASTOLIC BLOOD PRESSURE: 70 MMHG | HEIGHT: 59 IN

## 2024-09-03 VITALS
DIASTOLIC BLOOD PRESSURE: 70 MMHG | BODY MASS INDEX: 18.95 KG/M2 | HEART RATE: 57 BPM | HEIGHT: 59 IN | WEIGHT: 94 LBS | OXYGEN SATURATION: 97 % | SYSTOLIC BLOOD PRESSURE: 145 MMHG

## 2024-09-03 DIAGNOSIS — R63.4 ABNORMAL WEIGHT LOSS: ICD-10-CM

## 2024-09-03 PROCEDURE — 99214 OFFICE O/P EST MOD 30 MIN: CPT

## 2024-09-03 NOTE — PHYSICAL EXAM
[Bowel Sounds] : normal bowel sounds [Abdomen Tenderness] : non-tender [No Masses] : no abdominal mass palpated [Abdomen Soft] : soft [Cervical Lymph Nodes Enlarged Posterior Bilaterally] : no posterior cervical lymphadenopathy [Supraclavicular Lymph Nodes Enlarged Bilaterally] : no supraclavicular lymphadenopathy [Cervical Lymph Nodes Enlarged Anterior Bilaterally] : no anterior cervical lymphadenopathy [No Clubbing, Cyanosis] : no clubbing or cyanosis of the fingernails [Normal Color / Pigmentation] : normal skin color and pigmentation [] : no rash [Normal] : oriented to person, place, and time

## 2024-09-03 NOTE — HISTORY OF PRESENT ILLNESS
[FreeTextEntry1] : 89 y/o mother of three, with hx of aortic valve insufficiency, CVA (left MCA territory 3/20/2024, treated acutely with TNK), mild carotid atherosclerosis, Paroxysmal supraventricular tachycardia, HLD, Celiac disease, osteoporosis, who presents for weight loss, she has lost her weight.  She says she has been under stress - her son and daughter in law both have bipolar disorder - her son lost his job. He has run out of money and she and her  are under financial strain.  She says her  has also lost weight - lost two pant sizes because of the stress.  She is following a celiac disease diet, dash diet, GERD diet and says her dieting may have contributed to weight loss. She is no longer following the dash diet, the gerd diet, she continues to eat gluten free. She is asking about pantoprazole says she had a bone scan and is worried about pantoprazole affecting health.   Says difficulty swallowing pills.   Over the weekend felt a discomfort in left side of abdomen.   Patient denies having constipation, diarrhea, melena and hematochezia. Patient denies having heartburn, regurgitation, nausea, vomiting.    Initial office visit 5/2024; She has lost 20 lbs since one year -she says her weight is due to stress - She weight 106 and now is 98 lbs since past couple of months. I hear a slight slurring of the speech - she says it is not due to stroke, she is nervous - offered to reach to her children and  t be present by way of phone call she would like for me to hold off.  she says she has burning in upper abdominal for many years on and off - she has been taking pantoprazole for one year -it is helping. She says she gets a pian in right upper abdomen - it happened three times attributes to stress - last RUQ pain was in April - says it is a burning pain - several hours of pain - she attributes pain to acid. She says has difficulty swallowing larger pills only.  Patient denies having nausea, vomiting. She says she also follows a "GERD diet." She follows a celiac diet - she was diagnosed with celiac disease 25 yrs ago. Last upper endoscopy was 25 yrs ago and this is when she was diagnosed with celiac disease - says at the time her weight 79 lbs.  Says gets constipated - on and off for years - for the most part daily BM. She has had two colonoscopies -says they were not "pleasant experiences" - last exam was 25 yrs. Says the first colonoscopy, it was painful. Says on the second colonoscopy they had difficulty waking her up. She does not recall having polyps in her lifetime.  Patient denies having diarrhea, melena and hematochezia.  Paternal uncle had colon cancer in his 70s. Patient denies family history of other gastrointestinal cancers.  Gets palpitations. All other review of systems are negative.    Discussion/Summary 5/2024:   Called patient and reviewed upper GI series, CT colonography, MRI/MRCP - benign findings - advised follow up in 2 months for weight check.

## 2024-09-03 NOTE — ASSESSMENT
[FreeTextEntry1] : IMPRESSION: # Abnormal weight loss - upper GI series, CT colonography, MRI/MRCP 5/2024- benign findings  -  Lipase of 69 on 8/2024 - Lost 20 lbs for one year -she says her weight is due to stress - lost a few more pounds - burning in upper abdomen for many years on and off -pantoprazole for one year -it is helping. - difficulty swallowing larger pills only. - Esophagram on 5/2023: Nml transit of barium pill through esophagus. Mild tertiary esophageal contractions. Minimal reflux. - Follows a celiac diet - she was diagnosed with celiac disease 25 yrs ago - TTG IgA neg, IgA nml on 5/2024  - Last upper endoscopy was 25 yrs ago and this is when she was diagnosed with celiac disease - says at the time her weight 79 lbs. - gets constipated - on and off for years - for the most part daily BM. - Two colonoscopies -says they were not "pleasant experiences" - last exam was 25 yrs. Says the first colonoscopy, it was painful. Says on the second colonoscopy they had difficulty waking her up. She does not recall having polyps in her lifetime.  # Family history of colon cancer - Paternal uncle had colon cancer in his 70s.  # Elevated ALT in past now nml at 41 on 8/2024  - CT scan A/P with IV contrast on 4/2024: 4.6 hepatic cyst. Right renal cyst. - Abdominal ultrasound on 8/2023: Simple appearing hepatic cyst unchanged.   # Comorbidities: Aortic valve insufficiency, CVA (left MCA territory 3/20/2024, treated acutely with TNK), mild carotid atherosclerosis, Paroxysmal supraventricular tachycardia, HLD, osteoporosis    PLAN: Discussed endoscopic ultrasound (she is worried about her lipase, says her friend had pancreatic cancer, she showed me an article of early-stage pancreatic cancer) - provided reassurance- risks of EUS/benefits of EUS/alternatives of EUS were reviewed she will think about it with her children and let me know.     She would like to establish with psychiatrist since abnormal weight may all be stress related.   Pros/cons of long term PPI reviewed, she can come off pantoprazole and take pepcid over the counter as needed.

## 2024-09-09 ENCOUNTER — APPOINTMENT (OUTPATIENT)
Dept: CARDIOLOGY | Facility: CLINIC | Age: 89
End: 2024-09-09
Payer: MEDICARE

## 2024-09-09 VITALS
BODY MASS INDEX: 18.95 KG/M2 | SYSTOLIC BLOOD PRESSURE: 148 MMHG | HEART RATE: 54 BPM | OXYGEN SATURATION: 99 % | DIASTOLIC BLOOD PRESSURE: 84 MMHG | WEIGHT: 94 LBS | HEIGHT: 59 IN

## 2024-09-09 DIAGNOSIS — I47.10 SUPRAVENTRICULAR TACHYCARDIA, UNSPECIFIED: ICD-10-CM

## 2024-09-09 DIAGNOSIS — R00.1 BRADYCARDIA, UNSPECIFIED: ICD-10-CM

## 2024-09-09 DIAGNOSIS — I35.1 NONRHEUMATIC AORTIC (VALVE) INSUFFICIENCY: ICD-10-CM

## 2024-09-09 DIAGNOSIS — E78.5 HYPERLIPIDEMIA, UNSPECIFIED: ICD-10-CM

## 2024-09-09 PROCEDURE — 93000 ELECTROCARDIOGRAM COMPLETE: CPT

## 2024-09-09 PROCEDURE — 99215 OFFICE O/P EST HI 40 MIN: CPT

## 2024-09-09 PROCEDURE — G2211 COMPLEX E/M VISIT ADD ON: CPT

## 2024-09-09 RX ORDER — FAMOTIDINE 20 MG/1
20 TABLET, FILM COATED ORAL DAILY
Refills: 0 | Status: ACTIVE | COMMUNITY

## 2024-09-09 NOTE — DISCUSSION/SUMMARY
[FreeTextEntry1] : Mrs. Pacheco was noted by her  on 3/20/2024 to be exhibiting slurred speech and not responding appropriately, for which she was brought to the emergency department at [facility].  A CT scan of the head (stroke protocol) revealed a core infarct with ischemic penumbra in the left MCA territory, for which the patient was treated with TNK.  A CTA scan of the brain revealed occlusion of the left proximal M2 branch with distal reconstitution.  A CTA scan of the neck revealed no significant carotid artery stenoses and patent vertebral arteries.  An MRI scan of the brain revealed a left MCA distribution CVA.  The patient was treated with statin therapy and aspirin.  She experienced a vague sensation which she attributed to Plavix, and the Plavix was discontinued.  She was discharged from the hospital on 3/24/2024.  She has not experienced recurrence of any neurological signs or symptoms since coming home from the hospital, nor does she have any recognized residual neurological deficits related to the CVA.    The patient has been stable from a cardiac symptomatic standpoint since her previous visit here on 6/4/2024.  Specifically, she has not experienced any signs or symptoms to suggest the development of an anginal syndrome, congestive heart failure, or a hemodynamically-compromising arrhythmia.  Her cardiac examination today is remarkable for a faint systolic murmur heard throughout the precordium, unchanged from her previous visit with me.  Her blood pressure reading today is normal.  Her electrocardiogram today reveals sinus bradycardia with a nonspecific RSR' pattern in leads V1-V2 and nonspecific repolarization abnormalities, essentially unchanged from her previous office tracing, allowing for lead placement variation.  I have explained to the patient that in her age group, a significant percentage of patients who suffer a CVA have underlying paroxysmal atrial fibrillation as the etiology.  Although cardiac monitoring revealed sinus rhythm throughout the patient's hospitalization during her CVA, I explained to her that it is still possible that she may have paroxysmal atrial fibrillation.  For this reason, she underwent implantation of an ILR device on 5/22/2024 to monitor for the possibility of paroxysmal atrial fibrillation.  To date, no episodes of paroxysmal atrial fibrillation have been detected.  I have reviewed the findings of the blood test report of 8/22/2024 in detail with the patient today and I have instructed her to continue Crestor 10 mg daily for the time being.  I have reviewed the findings of the echocardiogram of 6/25/2024 in detail with the patient today.  Note that no pericardial effusion was identified on this study.  I again reassured the patient today that the supraventricular ectopy she has exhibited is most likely benign in nature.  I suspect that the patient's previous episodes of palpitations are benign in nature as well.  The patient now has an indwelling ILR device to monitor her rhythm.  I have reviewed the findings of the nuclear stress study of 10/19/2021, the inpatient echocardiograms of 3/21/2024 and 3/23/2024, and the inpatient carotid artery Doppler study of 12/25/2023 in detail with the patient today.  In addition, I have reviewed the findings of the CTA of the brain, the CTA of the neck, and the MRI of the brain performed during the patient's hospitalization with a CVA in March 2024.  I have asked the patient to call me if she should have any questions or problems pertaining to these matters, and especially if she should experience any concerning symptoms.  I have otherwise asked her to return to the office for follow-up cardiac evaluation in 6 months, provided she remains clinically stable in the interim.  Follow-up echocardiography will be planned for June 2025 for reassessment. [EKG obtained to assist in diagnosis and management of assessed problem(s)] : EKG obtained to assist in diagnosis and management of assessed problem(s)

## 2024-09-09 NOTE — CARDIOLOGY SUMMARY
[de-identified] : 9/9/24 -sinus bradycardia at a rate of 53 bpm.  Nonspecific RSR' pattern in leads V1-V2.  Nonspecific repolarization changes.

## 2024-09-09 NOTE — HISTORY OF PRESENT ILLNESS
[FreeTextEntry1] : Mrs. Love Pacheco presented to the office today for follow-up cardiac evaluation.  I initially evaluated the patient in the office on 9/1/2021 in cardiology consultation.  She was referred here at that time by Dr. Wang for further evaluation of electrocardiographic abnormalities, palpitations, and dyspnea.  I last evaluated the patient in the office on 6/4/2024.  I have also taken care of her , who accompanied the patient to her visit here today.  The patient is an 89-year-old female with a history of supraventricular ectopy, palpitations, dyspnea, mild aortic insufficiency, a CVA (left MCA territory 3/20/2024, treated acutely with TNK), mild carotid atherosclerosis, a dyslipidemia, pre-diabetes, celiac disease, a hepatic cyst, a right kidney cyst, a cystocele, left thyroid nodules, osteoporosis, urinary stress incontinence (pessary), a uterine polyp, tonsillectomy, and a left femur stress hairline fracture (thought to be related to therapy with Fosamax or Prolia - status post ORIF 7/18/2018).  The patient has been stable from a cardiac symptomatic standpoint since her previous visit here on 6/4/2024.  Specifically, she has not experienced chest discomfort or dyspnea on exertion in association with her current activities.  She has not noted orthopnea, paroxysmal nocturnal dyspnea, or lower extremity edema.  She has rarely experienced randomly occurring very brief palpitations, however, she has not experienced any sustained episodes of palpitations or a sensation of tachycardia.  She has not experienced any episodes of presyncope or syncope.  At the time of a follow-up visit with me on 4/17/2024, I referred the patient for implantation of an implantable loop recorder device to monitor for the possibility of paroxysmal atrial fibrillation as the etiology of her CVA on 3/20/2024.  This device was implanted on 5/22/2024 by Dr. Cayden Grover.  To date, no episodes of paroxysmal atrial fibrillation have been detected.  The patient reports having not been feeling well after Crestor was switched to Lipitor, and also was discovered as having elevated AST, ALT, and lipase levels on 3/28/2024.  She underwent a CT scan of the abdomen/pelvis on 4/2/2024, which did not reveal any acute findings (unchanged central hepatic cyst).  She reports having presented to the emergency department at [facility] [place] on 4/12/2024 with right upper quadrant abdominal discomfort, at which time her liver chemistries were again noted to be elevated.  A CT scan of the abdomen was reportedly unrevealing, and the patient was discharged from the emergency department.  Note that she elected to discontinue Lipitor at that point.  At the time of a follow-up visit with me on 4/17/2024, I restarted the patient on Crestor at a higher dosage (10 mg daily), in view of the patient having had a CVA.  The patient had a GI consultation with Dr. Avila on 5/28/2024 regarding a 20 pound weight loss over the previous year, as well as elevated alkaline phosphatase and lipase levels.  He subsequently underwent an MRI/MRCP study on 5/15/2024, which was essentially unremarkable, with the exception of a hepatic cyst being identified.  CT colonography performed on 5/17/2024 did not reveal any polyps or masses.  A barium swallow performed on 5/20/2024 was unrevealing from the patient's description.  The patient consulted with Dr. Avila on 9/3/2024, at which time EUS was discussed for further evaluation of the mildly elevated lipase level, however, the patient has not elected to pursue this evaluation at this time.  As far as risk factors for coronary artery disease are concerned, the patient has a dyslipidemia and pre-diabetes.  She does not have a history of hypertension.  She denies a history of cigarette smoking.  She describes a family history of coronary disease, stating that her father suffered "a heart attack" in his 60's.  Laboratory studies performed on 8/22/2024 (on Crestor 10 mg daily) revealed cholesterol 146, triglycerides 55, HDL 66, and calculated LDL 69.  The liver chemistries were satisfactory.  The lipase level was mildly elevated at 69.  The BUN and creatinine were 11 and 0.69, respectively.  The potassium level was 4.6.  The glucose level was 88 and the hemoglobin A1c level was 5.6%.  The hemoglobin and hematocrit were 14.4 and 44.1, respectively.  The TSH level was 4.27.  The vitamin D level was 58.8.  Exercise stress testing performed on 9/28/2021 was positive for electrocardiographic evidence of myocardial ischemia without the inducement of cardiac symptoms.  The patient was referred for nuclear stress testing for further evaluation.  Nuclear stress testing performed on 10/19/2021 was negative for the inducement of cardiac symptoms, however, up to 1 mm of horizontal ST segment depression developed in the lateral leads.  The cardiac imaging portion of the study revealed normal left ventricular myocardial perfusion and systolic function.  Echocardiography most recently performed on 6/5/2024 revealed the right atrium to be mildly dilated.  The remainder of the cardiac chambers were normal in dimension.  Left ventricular wall thickness was normal.  Left ventricular systolic function was normal, with a calculated ejection fraction of 65%.  Mild aortic regurgitation was demonstrated.  Moderate tricuspid regurgitation was demonstrated, without evidence of pulmonary hypertension.  A hepatic cyst was identified (the patient has a known history of a hepatic cyst).  Carotid artery Doppler testing performed on 9/13/2021 revealed mild plaque formation involving the right bulbar region and the proximal portion of the right internal carotid artery.  Carotid artery Doppler testing most recently performed as an inpatient at [facility] [place] (admitted for syncope) on 12/25/2023 did not reveal any significant stenoses.  Previous History:  The patient had her  drive her to the emergency department in [facility] [place] early in the morning on 5/12/2023 for further evaluation of palpitations that she was experiencing that night, described as a "pounding" sensation in her chest.  She was noted to be exhibiting sinus rhythm with supraventricular ectopy in the emergency department.  Serum troponin levels were negative for evidence of an acute coronary syndrome.  The patient's symptoms were attributed to stress and anxiety, noting that she reported being under considerable stress related to her son having bipolar disorder and having lost his job.  She was released from the emergency department after being monitored for several hours.  She has not experienced recurrence of palpitations since that date.  The patient awakened at approximately 6:00 AM on 12/25/2023, promptly got out of bed and went into the kitchen.  While standing at the sink shortly after having come into the kitchen, the patient experienced a vague sensation in her head, quickly progressing to a syncopal episode.  She fell to the floor, with most of her body landing on a carpeted section, however, she states that her head hit the ceramic floor.  She quickly regained consciousness, however, she felt too weak to stand up on her own.  Her  called the fire department and assisted the patient up.  She was brought to the emergency department at [facility] [place].  A CT scan of the head and neck performed on 12/25/2023 in the hospital did not reveal any acute findings.  Carotid Doppler study performed in the hospital on 12/25/2023 did not reveal any significant stenoses.  The patient was monitored in the hospital overnight, without any significant arrhythmias detected.  Echocardiography performed in the hospital on 12/26/2023 revealed normal cardiac chamber sizes with normal left ventricular wall thickness and wall motion (ejection fraction 57%).  Mild aortic insufficiency was identified.  The patient was discharged home on 12/26/2023.  The patient presented to the [facility] [place] emergency department on 3/20/2024 after her  noted her to be exhibiting slurred speech and to not be responding appropriately.  A CT scan of the head (stroke protocol) revealed a core infarct with ischemic penumbra in the left MCA territory, for which the patient was administered TNK.  A CTA scan of the brain revealed occlusion of the left proximal M2 branch with distal reconstitution.  CTA of the neck revealed no significant carotid artery stenoses and patent vertebral arteries.  An MRI scan of the brain revealed a left MCA distribution CVA.  The patient's statin therapy was switched from Crestor 5 mg daily to Lipitor 80 mg daily.  Aspirin and Plavix were initiated as well.  The patient subsequently developed a vaguely-described symptom, which she attributed to Plavix, and the Plavix was discontinued.  Her dosage of aspirin was temporarily increased to 325 mg daily at that time.  Echocardiography performed in the hospital on 3/21/2024 was interpreted as being a suboptimal study with normal cardiac chamber sizes, grossly normal left ventricular systolic function, mild aortic regurgitation, mild to moderate tricuspid regurgitation (PASP 28 mmHg), and a small to moderate pericardial effusion anterior to the RA and RV (without evidence of pericardial tamponade).  Follow-up echocardiography performed in the hospital on 3/23/2024 was interpreted as revealing normal left ventricular size and wall thickness with an estimated ejection fraction of 55 to 60% and normal left ventricular diastolic function.  A trace pericardial effusion was identified on this study.  The patient was discharged from the hospital on 3/24/2024.  Since coming home from the hospital on 3/24/2024, the patient has not experienced recurrence of any neurological signs or symptoms, nor does she have any recognized residual neurological deficits.

## 2024-09-09 NOTE — PHYSICAL EXAM
[Well Developed] : well developed [No Acute Distress] : no acute distress [Normal Conjunctiva] : normal conjunctiva [No Carotid Bruit] : no carotid bruit [Normal S1, S2] : normal S1, S2 [No Rub] : no rub [No Gallop] : no gallop [Clear Lung Fields] : clear lung fields [No Respiratory Distress] : no respiratory distress  [Soft] : abdomen soft [Non Tender] : non-tender [Normal Gait] : normal gait [No Edema] : no edema [No Rash] : no rash [Moves all extremities] : moves all extremities [Alert and Oriented] : alert and oriented [de-identified] : No JVD is appreciated at a 45 degree angle [de-identified] : I/VI systolic murmur throughout precordium

## 2024-09-10 ENCOUNTER — OFFICE (OUTPATIENT)
Dept: URBAN - METROPOLITAN AREA CLINIC 109 | Facility: CLINIC | Age: 89
Setting detail: OPHTHALMOLOGY
End: 2024-09-10
Payer: MEDICARE

## 2024-09-10 DIAGNOSIS — H01.005: ICD-10-CM

## 2024-09-10 DIAGNOSIS — H26.493: ICD-10-CM

## 2024-09-10 DIAGNOSIS — H40.013: ICD-10-CM

## 2024-09-10 DIAGNOSIS — H01.002: ICD-10-CM

## 2024-09-10 DIAGNOSIS — H01.001: ICD-10-CM

## 2024-09-10 DIAGNOSIS — H01.004: ICD-10-CM

## 2024-09-10 DIAGNOSIS — H02.401: ICD-10-CM

## 2024-09-10 PROCEDURE — 92133 CPTRZD OPH DX IMG PST SGM ON: CPT | Performed by: OPHTHALMOLOGY

## 2024-09-10 PROCEDURE — 92083 EXTENDED VISUAL FIELD XM: CPT | Performed by: OPHTHALMOLOGY

## 2024-09-10 PROCEDURE — 92014 COMPRE OPH EXAM EST PT 1/>: CPT | Performed by: OPHTHALMOLOGY

## 2024-09-10 ASSESSMENT — LID EXAM ASSESSMENTS
OD_MEIBOMITIS: RLL RUL 2+
OD_BLEPHARITIS: RLL RUL 1+ 2+
OS_MEIBOMITIS: LLL LUL 2+
OS_BLEPHARITIS: LLL LUL 1+ 2+

## 2024-09-10 ASSESSMENT — CONFRONTATIONAL VISUAL FIELD TEST (CVF)
OD_FINDINGS: FULL
OS_FINDINGS: FULL

## 2024-09-10 ASSESSMENT — LID POSITION - PTOSIS: OD_PTOSIS: RUL 1+ 2+

## 2024-09-11 ENCOUNTER — NON-APPOINTMENT (OUTPATIENT)
Age: 89
End: 2024-09-11

## 2024-09-18 ENCOUNTER — APPOINTMENT (OUTPATIENT)
Dept: ELECTROPHYSIOLOGY | Facility: CLINIC | Age: 89
End: 2024-09-18
Payer: MEDICARE

## 2024-09-18 ENCOUNTER — NON-APPOINTMENT (OUTPATIENT)
Age: 89
End: 2024-09-18

## 2024-09-18 PROCEDURE — 93298 REM INTERROG DEV EVAL SCRMS: CPT

## 2024-09-24 ENCOUNTER — APPOINTMENT (OUTPATIENT)
Dept: ULTRASOUND IMAGING | Facility: CLINIC | Age: 89
End: 2024-09-24

## 2024-09-24 ENCOUNTER — OUTPATIENT (OUTPATIENT)
Dept: OUTPATIENT SERVICES | Facility: HOSPITAL | Age: 89
LOS: 1 days | End: 2024-09-24
Payer: MEDICARE

## 2024-09-24 DIAGNOSIS — Z98.890 OTHER SPECIFIED POSTPROCEDURAL STATES: Chronic | ICD-10-CM

## 2024-09-24 DIAGNOSIS — E04.2 NONTOXIC MULTINODULAR GOITER: ICD-10-CM

## 2024-09-24 DIAGNOSIS — Z41.9 ENCOUNTER FOR PROCEDURE FOR PURPOSES OTHER THAN REMEDYING HEALTH STATE, UNSPECIFIED: Chronic | ICD-10-CM

## 2024-09-24 PROCEDURE — 76536 US EXAM OF HEAD AND NECK: CPT

## 2024-09-24 PROCEDURE — 76536 US EXAM OF HEAD AND NECK: CPT | Mod: 26

## 2024-10-23 ENCOUNTER — NON-APPOINTMENT (OUTPATIENT)
Age: 89
End: 2024-10-23

## 2024-10-23 ENCOUNTER — APPOINTMENT (OUTPATIENT)
Dept: ELECTROPHYSIOLOGY | Facility: CLINIC | Age: 89
End: 2024-10-23
Payer: MEDICARE

## 2024-10-23 PROCEDURE — 93298 REM INTERROG DEV EVAL SCRMS: CPT

## 2024-10-31 ENCOUNTER — APPOINTMENT (OUTPATIENT)
Dept: ENDOCRINOLOGY | Facility: CLINIC | Age: 89
End: 2024-10-31
Payer: MEDICARE

## 2024-10-31 VITALS
DIASTOLIC BLOOD PRESSURE: 62 MMHG | OXYGEN SATURATION: 98 % | SYSTOLIC BLOOD PRESSURE: 110 MMHG | HEART RATE: 60 BPM | WEIGHT: 94 LBS | HEIGHT: 59 IN | BODY MASS INDEX: 18.95 KG/M2

## 2024-10-31 DIAGNOSIS — E04.2 NONTOXIC MULTINODULAR GOITER: ICD-10-CM

## 2024-10-31 DIAGNOSIS — M81.0 AGE-RELATED OSTEOPOROSIS W/OUT CURRENT PATHOLOGICAL FRACTURE: ICD-10-CM

## 2024-10-31 DIAGNOSIS — E03.9 HYPOTHYROIDISM, UNSPECIFIED: ICD-10-CM

## 2024-10-31 PROCEDURE — 99214 OFFICE O/P EST MOD 30 MIN: CPT

## 2024-10-31 PROCEDURE — G2211 COMPLEX E/M VISIT ADD ON: CPT

## 2024-10-31 RX ORDER — EMPAGLIFLOZIN 10 MG/1
10 TABLET, FILM COATED ORAL
Refills: 0 | Status: ACTIVE | COMMUNITY

## 2024-10-31 RX ORDER — LEVOTHYROXINE SODIUM 0.03 MG/1
25 TABLET ORAL
Qty: 1 | Refills: 2 | Status: ACTIVE | COMMUNITY
Start: 2024-10-31 | End: 1900-01-01

## 2024-11-04 ENCOUNTER — APPOINTMENT (OUTPATIENT)
Dept: UROGYNECOLOGY | Facility: CLINIC | Age: 89
End: 2024-11-04
Payer: MEDICARE

## 2024-11-04 DIAGNOSIS — N81.4 UTEROVAGINAL PROLAPSE, UNSPECIFIED: ICD-10-CM

## 2024-11-04 PROCEDURE — 99213 OFFICE O/P EST LOW 20 MIN: CPT

## 2024-11-04 PROCEDURE — G2211 COMPLEX E/M VISIT ADD ON: CPT

## 2024-11-25 ENCOUNTER — APPOINTMENT (OUTPATIENT)
Dept: ELECTROPHYSIOLOGY | Facility: CLINIC | Age: 89
End: 2024-11-25
Payer: MEDICARE

## 2024-11-25 ENCOUNTER — NON-APPOINTMENT (OUTPATIENT)
Age: 89
End: 2024-11-25

## 2024-11-25 PROCEDURE — 93298 REM INTERROG DEV EVAL SCRMS: CPT

## 2024-12-04 ENCOUNTER — APPOINTMENT (OUTPATIENT)
Dept: ELECTROPHYSIOLOGY | Facility: CLINIC | Age: 89
End: 2024-12-04

## 2024-12-18 ENCOUNTER — APPOINTMENT (OUTPATIENT)
Dept: ENDOCRINOLOGY | Facility: CLINIC | Age: 89
End: 2024-12-18
Payer: MEDICARE

## 2024-12-18 DIAGNOSIS — M81.0 AGE-RELATED OSTEOPOROSIS W/OUT CURRENT PATHOLOGICAL FRACTURE: ICD-10-CM

## 2024-12-18 PROCEDURE — 96372 THER/PROPH/DIAG INJ SC/IM: CPT

## 2024-12-18 RX ORDER — DENOSUMAB 60 MG/ML
60 INJECTION SUBCUTANEOUS
Qty: 1 | Refills: 0 | Status: COMPLETED | OUTPATIENT
Start: 2024-12-18

## 2024-12-18 RX ADMIN — DENOSUMAB 60 MG/ML: 60 INJECTION SUBCUTANEOUS at 00:00

## 2024-12-30 ENCOUNTER — APPOINTMENT (OUTPATIENT)
Dept: ELECTROPHYSIOLOGY | Facility: CLINIC | Age: 89
End: 2024-12-30
Payer: MEDICARE

## 2024-12-30 ENCOUNTER — NON-APPOINTMENT (OUTPATIENT)
Age: 89
End: 2024-12-30

## 2024-12-30 PROCEDURE — 93298 REM INTERROG DEV EVAL SCRMS: CPT

## 2024-12-31 LAB
ALBUMIN SERPL ELPH-MCNC: 4.1 G/DL
ALP BLD-CCNC: 75 U/L
ALT SERPL-CCNC: 34 U/L
AST SERPL-CCNC: 34 U/L
BILIRUB DIRECT SERPL-MCNC: 0.1 MG/DL
BILIRUB INDIRECT SERPL-MCNC: 0.2 MG/DL
BILIRUB SERPL-MCNC: 0.3 MG/DL
LPL SERPL-CCNC: 86 U/L
PROT SERPL-MCNC: 6.3 G/DL

## 2025-01-23 ENCOUNTER — APPOINTMENT (OUTPATIENT)
Dept: INTERNAL MEDICINE | Facility: CLINIC | Age: 89
End: 2025-01-23
Payer: MEDICARE

## 2025-01-23 VITALS
RESPIRATION RATE: 16 BRPM | HEART RATE: 84 BPM | OXYGEN SATURATION: 97 % | DIASTOLIC BLOOD PRESSURE: 64 MMHG | TEMPERATURE: 97.5 F | WEIGHT: 99 LBS | HEIGHT: 59 IN | SYSTOLIC BLOOD PRESSURE: 126 MMHG | BODY MASS INDEX: 19.96 KG/M2

## 2025-01-23 DIAGNOSIS — R63.4 ABNORMAL WEIGHT LOSS: ICD-10-CM

## 2025-01-23 DIAGNOSIS — I63.9 CEREBRAL INFARCTION, UNSPECIFIED: ICD-10-CM

## 2025-01-23 DIAGNOSIS — E78.5 HYPERLIPIDEMIA, UNSPECIFIED: ICD-10-CM

## 2025-01-23 DIAGNOSIS — R09.82 POSTNASAL DRIP: ICD-10-CM

## 2025-01-23 PROCEDURE — G2211 COMPLEX E/M VISIT ADD ON: CPT

## 2025-01-23 PROCEDURE — 99214 OFFICE O/P EST MOD 30 MIN: CPT

## 2025-01-23 RX ORDER — IPRATROPIUM BROMIDE 21 UG/1
0.03 SPRAY NASAL TWICE DAILY
Qty: 1 | Refills: 0 | Status: ACTIVE | COMMUNITY
Start: 2025-01-23 | End: 1900-01-01

## 2025-01-30 ENCOUNTER — APPOINTMENT (OUTPATIENT)
Dept: ENDOCRINOLOGY | Facility: CLINIC | Age: 89
End: 2025-01-30
Payer: MEDICARE

## 2025-01-30 VITALS
HEIGHT: 59 IN | SYSTOLIC BLOOD PRESSURE: 151 MMHG | BODY MASS INDEX: 19.96 KG/M2 | OXYGEN SATURATION: 99 % | WEIGHT: 99 LBS | DIASTOLIC BLOOD PRESSURE: 82 MMHG | HEART RATE: 64 BPM

## 2025-01-30 DIAGNOSIS — E03.9 HYPOTHYROIDISM, UNSPECIFIED: ICD-10-CM

## 2025-01-30 DIAGNOSIS — M81.0 AGE-RELATED OSTEOPOROSIS W/OUT CURRENT PATHOLOGICAL FRACTURE: ICD-10-CM

## 2025-01-30 PROCEDURE — 99214 OFFICE O/P EST MOD 30 MIN: CPT

## 2025-01-30 PROCEDURE — G2211 COMPLEX E/M VISIT ADD ON: CPT

## 2025-01-31 ENCOUNTER — NON-APPOINTMENT (OUTPATIENT)
Age: 89
End: 2025-01-31

## 2025-01-31 ENCOUNTER — APPOINTMENT (OUTPATIENT)
Dept: ELECTROPHYSIOLOGY | Facility: CLINIC | Age: 89
End: 2025-01-31
Payer: MEDICARE

## 2025-01-31 PROCEDURE — 93298 REM INTERROG DEV EVAL SCRMS: CPT

## 2025-02-03 ENCOUNTER — APPOINTMENT (OUTPATIENT)
Dept: UROGYNECOLOGY | Facility: CLINIC | Age: 89
End: 2025-02-03
Payer: MEDICARE

## 2025-02-03 DIAGNOSIS — N81.4 UTEROVAGINAL PROLAPSE, UNSPECIFIED: ICD-10-CM

## 2025-02-03 PROCEDURE — G2211 COMPLEX E/M VISIT ADD ON: CPT

## 2025-02-03 PROCEDURE — 99213 OFFICE O/P EST LOW 20 MIN: CPT

## 2025-02-07 LAB
25(OH)D3 SERPL-MCNC: 52.5 NG/ML
ALBUMIN SERPL ELPH-MCNC: 4 G/DL
ALP BLD-CCNC: 60 U/L
ALT SERPL-CCNC: 29 U/L
ANION GAP SERPL CALC-SCNC: 11 MMOL/L
AST SERPL-CCNC: 40 U/L
BILIRUB SERPL-MCNC: 0.2 MG/DL
BUN SERPL-MCNC: 15 MG/DL
CALCIUM SERPL-MCNC: 9.5 MG/DL
CHLORIDE SERPL-SCNC: 100 MMOL/L
CO2 SERPL-SCNC: 26 MMOL/L
CREAT SERPL-MCNC: 0.6 MG/DL
EGFR: 86 ML/MIN/1.73M2
GLUCOSE SERPL-MCNC: 83 MG/DL
POTASSIUM SERPL-SCNC: 5.3 MMOL/L
PROT SERPL-MCNC: 6.5 G/DL
SODIUM SERPL-SCNC: 138 MMOL/L
T4 FREE SERPL-MCNC: 1.2 NG/DL
TSH SERPL-ACNC: 2.3 UIU/ML

## 2025-02-14 NOTE — PROGRESS NOTE ADULT - NS ATTEND OPT1 GEN_ALL_CORE
I attest my time as attending is greater than 50% of the total combined time spent on qualifying patient care activities by the PA/NP and attending. PAST MEDICAL HISTORY:  No pertinent past medical history

## 2025-03-07 ENCOUNTER — NON-APPOINTMENT (OUTPATIENT)
Age: 89
End: 2025-03-07

## 2025-03-07 ENCOUNTER — APPOINTMENT (OUTPATIENT)
Dept: ELECTROPHYSIOLOGY | Facility: CLINIC | Age: 89
End: 2025-03-07

## 2025-03-07 PROCEDURE — 93298 REM INTERROG DEV EVAL SCRMS: CPT

## 2025-03-11 ENCOUNTER — APPOINTMENT (OUTPATIENT)
Dept: CARDIOLOGY | Facility: CLINIC | Age: 89
End: 2025-03-11
Payer: MEDICARE

## 2025-03-11 VITALS
WEIGHT: 101 LBS | DIASTOLIC BLOOD PRESSURE: 66 MMHG | OXYGEN SATURATION: 100 % | SYSTOLIC BLOOD PRESSURE: 122 MMHG | HEIGHT: 59 IN | HEART RATE: 65 BPM | BODY MASS INDEX: 20.36 KG/M2

## 2025-03-11 DIAGNOSIS — I49.1 ATRIAL PREMATURE DEPOLARIZATION: ICD-10-CM

## 2025-03-11 DIAGNOSIS — I47.10 SUPRAVENTRICULAR TACHYCARDIA, UNSPECIFIED: ICD-10-CM

## 2025-03-11 DIAGNOSIS — R94.31 ABNORMAL ELECTROCARDIOGRAM [ECG] [EKG]: ICD-10-CM

## 2025-03-11 DIAGNOSIS — E78.5 HYPERLIPIDEMIA, UNSPECIFIED: ICD-10-CM

## 2025-03-11 PROCEDURE — 99215 OFFICE O/P EST HI 40 MIN: CPT

## 2025-03-11 PROCEDURE — 93000 ELECTROCARDIOGRAM COMPLETE: CPT

## 2025-03-11 PROCEDURE — G2211 COMPLEX E/M VISIT ADD ON: CPT

## 2025-03-19 NOTE — H&P PST ADULT - ENMT
Pt reports continued urinary retention enlarged prostate on previous CT AP 2024, follows with urology outpatient. Ultrasound has no hydronephrosis, bilateral jets detected. Outpatient CT renal protocol, cysto for micro hematuria workup. Continue Finasteride 5 mg qd and Flomax 0.4 mg qd. Pt encouraged to schedule f/u appt with urology and complete testing.      negative No oral lesions; no gross abnormalities

## 2025-03-27 NOTE — ED ADULT NURSE REASSESSMENT NOTE - NSFALLLASTSIX_ED_ALL_ED
Unable to determine.
Bed in lowest position, wheels locked, appropriate side rails in place/Call bell, personal items and telephone in reach/Instruct patient to call for assistance before getting out of bed or chair/Non-slip footwear when patient is out of bed/[place] to call system/Physically safe environment - no spills, clutter or unnecessary equipment/Purposeful Proactive Rounding/Room/bathroom lighting operational, light cord in reach

## 2025-04-11 ENCOUNTER — APPOINTMENT (OUTPATIENT)
Dept: ELECTROPHYSIOLOGY | Facility: CLINIC | Age: 89
End: 2025-04-11

## 2025-04-11 PROCEDURE — 93298 REM INTERROG DEV EVAL SCRMS: CPT

## 2025-04-17 DIAGNOSIS — I48.0 PAROXYSMAL ATRIAL FIBRILLATION: ICD-10-CM

## 2025-04-17 RX ORDER — APIXABAN 5 MG/1
5 TABLET, FILM COATED ORAL
Qty: 60 | Refills: 11 | Status: ACTIVE | COMMUNITY
Start: 2025-04-17 | End: 1900-01-01

## 2025-04-18 ENCOUNTER — RX RENEWAL (OUTPATIENT)
Age: 89
End: 2025-04-18

## 2025-05-16 ENCOUNTER — APPOINTMENT (OUTPATIENT)
Dept: ELECTROPHYSIOLOGY | Facility: CLINIC | Age: 89
End: 2025-05-16

## 2025-05-16 ENCOUNTER — NON-APPOINTMENT (OUTPATIENT)
Age: 89
End: 2025-05-16

## 2025-05-16 PROCEDURE — 93298 REM INTERROG DEV EVAL SCRMS: CPT

## 2025-06-16 ENCOUNTER — NON-APPOINTMENT (OUTPATIENT)
Age: 89
End: 2025-06-16

## 2025-06-18 ENCOUNTER — APPOINTMENT (OUTPATIENT)
Dept: ENDOCRINOLOGY | Facility: CLINIC | Age: 89
End: 2025-06-18
Payer: MEDICARE

## 2025-06-18 VITALS
WEIGHT: 99 LBS | HEART RATE: 66 BPM | HEIGHT: 58 IN | DIASTOLIC BLOOD PRESSURE: 60 MMHG | SYSTOLIC BLOOD PRESSURE: 120 MMHG | BODY MASS INDEX: 20.78 KG/M2 | OXYGEN SATURATION: 97 %

## 2025-06-18 PROCEDURE — 96372 THER/PROPH/DIAG INJ SC/IM: CPT

## 2025-06-18 PROCEDURE — 99214 OFFICE O/P EST MOD 30 MIN: CPT | Mod: 25

## 2025-06-18 RX ADMIN — DENOSUMAB 60 MG/ML: 60 INJECTION SUBCUTANEOUS at 00:00

## 2025-06-19 LAB
ALBUMIN SERPL ELPH-MCNC: 4 G/DL
ALP BLD-CCNC: 53 U/L
ALT SERPL-CCNC: 35 U/L
ANION GAP SERPL CALC-SCNC: 18 MMOL/L
AST SERPL-CCNC: 41 U/L
BILIRUB SERPL-MCNC: 0.4 MG/DL
BUN SERPL-MCNC: 16 MG/DL
CALCIUM SERPL-MCNC: 10 MG/DL
CHLORIDE SERPL-SCNC: 99 MMOL/L
CO2 SERPL-SCNC: 22 MMOL/L
CREAT SERPL-MCNC: 0.74 MG/DL
EGFRCR SERPLBLD CKD-EPI 2021: 78 ML/MIN/1.73M2
GLUCOSE SERPL-MCNC: 90 MG/DL
POTASSIUM SERPL-SCNC: 4.5 MMOL/L
PROT SERPL-MCNC: 6.3 G/DL
SODIUM SERPL-SCNC: 138 MMOL/L

## 2025-06-19 RX ORDER — DENOSUMAB 60 MG/ML
60 INJECTION SUBCUTANEOUS
Qty: 1 | Refills: 0 | Status: COMPLETED | OUTPATIENT
Start: 2025-06-18

## 2025-06-20 ENCOUNTER — NON-APPOINTMENT (OUTPATIENT)
Age: 89
End: 2025-06-20

## 2025-06-20 ENCOUNTER — APPOINTMENT (OUTPATIENT)
Dept: ELECTROPHYSIOLOGY | Facility: CLINIC | Age: 89
End: 2025-06-20

## 2025-06-20 PROCEDURE — 93298 REM INTERROG DEV EVAL SCRMS: CPT

## 2025-07-22 ENCOUNTER — RX RENEWAL (OUTPATIENT)
Age: 89
End: 2025-07-22

## 2025-07-25 ENCOUNTER — NON-APPOINTMENT (OUTPATIENT)
Age: 89
End: 2025-07-25

## 2025-07-25 ENCOUNTER — APPOINTMENT (OUTPATIENT)
Dept: ELECTROPHYSIOLOGY | Facility: CLINIC | Age: 89
End: 2025-07-25
Payer: MEDICARE

## 2025-07-25 PROCEDURE — 93298 REM INTERROG DEV EVAL SCRMS: CPT

## 2025-08-29 ENCOUNTER — APPOINTMENT (OUTPATIENT)
Dept: INTERNAL MEDICINE | Facility: CLINIC | Age: 89
End: 2025-08-29

## 2025-08-29 ENCOUNTER — APPOINTMENT (OUTPATIENT)
Dept: ELECTROPHYSIOLOGY | Facility: CLINIC | Age: 89
End: 2025-08-29

## 2025-08-29 PROCEDURE — 93298 REM INTERROG DEV EVAL SCRMS: CPT

## 2025-09-10 ENCOUNTER — APPOINTMENT (OUTPATIENT)
Dept: INTERNAL MEDICINE | Facility: CLINIC | Age: 89
End: 2025-09-10
Payer: MEDICARE

## 2025-09-10 VITALS
RESPIRATION RATE: 16 BRPM | BODY MASS INDEX: 20.56 KG/M2 | SYSTOLIC BLOOD PRESSURE: 122 MMHG | DIASTOLIC BLOOD PRESSURE: 70 MMHG | HEART RATE: 52 BPM | WEIGHT: 102 LBS | OXYGEN SATURATION: 96 % | HEIGHT: 59 IN | TEMPERATURE: 97.9 F

## 2025-09-10 DIAGNOSIS — Z23 ENCOUNTER FOR IMMUNIZATION: ICD-10-CM

## 2025-09-10 DIAGNOSIS — R92.30 DENSE BREASTS, UNSPECIFIED: ICD-10-CM

## 2025-09-10 DIAGNOSIS — Z00.00 ENCOUNTER FOR GENERAL ADULT MEDICAL EXAMINATION W/OUT ABNORMAL FINDINGS: ICD-10-CM

## 2025-09-10 PROCEDURE — G0008: CPT

## 2025-09-10 PROCEDURE — 90662 IIV NO PRSV INCREASED AG IM: CPT

## 2025-09-10 PROCEDURE — G0439: CPT

## 2025-09-15 ENCOUNTER — APPOINTMENT (OUTPATIENT)
Dept: UROGYNECOLOGY | Facility: CLINIC | Age: 89
End: 2025-09-15
Payer: MEDICARE

## 2025-09-15 PROCEDURE — 99213 OFFICE O/P EST LOW 20 MIN: CPT

## 2025-09-15 PROCEDURE — G2211 COMPLEX E/M VISIT ADD ON: CPT

## 2025-09-16 ENCOUNTER — APPOINTMENT (OUTPATIENT)
Dept: CARDIOLOGY | Facility: CLINIC | Age: 89
End: 2025-09-16
Payer: MEDICARE

## 2025-09-16 VITALS
SYSTOLIC BLOOD PRESSURE: 135 MMHG | HEART RATE: 52 BPM | BODY MASS INDEX: 20.56 KG/M2 | WEIGHT: 102 LBS | DIASTOLIC BLOOD PRESSURE: 73 MMHG | HEIGHT: 59 IN | OXYGEN SATURATION: 99 %

## 2025-09-16 DIAGNOSIS — I48.0 PAROXYSMAL ATRIAL FIBRILLATION: ICD-10-CM

## 2025-09-16 DIAGNOSIS — I63.9 CEREBRAL INFARCTION, UNSPECIFIED: ICD-10-CM

## 2025-09-16 DIAGNOSIS — E78.5 HYPERLIPIDEMIA, UNSPECIFIED: ICD-10-CM

## 2025-09-16 LAB
25(OH)D3 SERPL-MCNC: 53.6 NG/ML
ALBUMIN SERPL ELPH-MCNC: 4 G/DL
ALP BLD-CCNC: 58 U/L
ALT SERPL-CCNC: 38 U/L
ANION GAP SERPL CALC-SCNC: 16 MMOL/L
APPEARANCE: CLEAR
AST SERPL-CCNC: 42 U/L
BACTERIA: NEGATIVE /HPF
BASOPHILS # BLD AUTO: 0.07 K/UL
BASOPHILS NFR BLD AUTO: 1.3 %
BILIRUB SERPL-MCNC: 0.4 MG/DL
BILIRUBIN URINE: NEGATIVE
BLOOD URINE: ABNORMAL
BUN SERPL-MCNC: 16 MG/DL
CALCIUM SERPL-MCNC: 9.3 MG/DL
CAST: 0 /LPF
CHLORIDE SERPL-SCNC: 100 MMOL/L
CHOLEST SERPL-MCNC: 144 MG/DL
CO2 SERPL-SCNC: 21 MMOL/L
COLOR: YELLOW
CREAT SERPL-MCNC: 0.64 MG/DL
EGFRCR SERPLBLD CKD-EPI 2021: 84 ML/MIN/1.73M2
EOSINOPHIL # BLD AUTO: 0.29 K/UL
EOSINOPHIL NFR BLD AUTO: 5.5 %
EPITHELIAL CELLS: 2 /HPF
ESTIMATED AVERAGE GLUCOSE: 114 MG/DL
FOLATE SERPL-MCNC: 4.5 NG/ML
GLUCOSE QUALITATIVE U: NEGATIVE MG/DL
GLUCOSE SERPL-MCNC: 89 MG/DL
HBA1C MFR BLD HPLC: 5.6 %
HCT VFR BLD CALC: 41.5 %
HDLC SERPL-MCNC: 64 MG/DL
HGB BLD-MCNC: 13.6 G/DL
IMM GRANULOCYTES NFR BLD AUTO: 0.2 %
KETONES URINE: NEGATIVE MG/DL
LDLC SERPL-MCNC: 69 MG/DL
LEUKOCYTE ESTERASE URINE: ABNORMAL
LYMPHOCYTES # BLD AUTO: 0.84 K/UL
LYMPHOCYTES NFR BLD AUTO: 15.8 %
MAN DIFF?: NORMAL
MCHC RBC-ENTMCNC: 32 PG
MCHC RBC-ENTMCNC: 32.8 G/DL
MCV RBC AUTO: 97.6 FL
MICROSCOPIC-UA: NORMAL
MONOCYTES # BLD AUTO: 0.88 K/UL
MONOCYTES NFR BLD AUTO: 16.6 %
NEUTROPHILS # BLD AUTO: 3.21 K/UL
NEUTROPHILS NFR BLD AUTO: 60.6 %
NITRITE URINE: NEGATIVE
NONHDLC SERPL-MCNC: 79 MG/DL
PH URINE: 7
PLATELET # BLD AUTO: 229 K/UL
POTASSIUM SERPL-SCNC: 5.1 MMOL/L
PROT SERPL-MCNC: 6.4 G/DL
PROTEIN URINE: NEGATIVE MG/DL
RBC # BLD: 4.25 M/UL
RBC # FLD: 13.5 %
RED BLOOD CELLS URINE: 0 /HPF
REVIEW: NORMAL
SODIUM SERPL-SCNC: 136 MMOL/L
SPECIFIC GRAVITY URINE: 1.01
TRIGL SERPL-MCNC: 47 MG/DL
TSH SERPL-ACNC: 2.87 UIU/ML
UROBILINOGEN URINE: 0.2 MG/DL
VIT B12 SERPL-MCNC: 753 PG/ML
WBC # FLD AUTO: 5.3 K/UL
WHITE BLOOD CELLS URINE: 1 /HPF

## 2025-09-16 PROCEDURE — G2211 COMPLEX E/M VISIT ADD ON: CPT

## 2025-09-16 PROCEDURE — 99215 OFFICE O/P EST HI 40 MIN: CPT

## 2025-09-16 PROCEDURE — 93000 ELECTROCARDIOGRAM COMPLETE: CPT
